# Patient Record
Sex: FEMALE | Race: BLACK OR AFRICAN AMERICAN | NOT HISPANIC OR LATINO | Employment: OTHER | URBAN - METROPOLITAN AREA
[De-identification: names, ages, dates, MRNs, and addresses within clinical notes are randomized per-mention and may not be internally consistent; named-entity substitution may affect disease eponyms.]

---

## 2018-04-09 ENCOUNTER — TELEPHONE (OUTPATIENT)
Dept: FAMILY MEDICINE | Facility: CLINIC | Age: 58
End: 2018-04-09

## 2018-04-09 NOTE — TELEPHONE ENCOUNTER
Pt came into the office and left a message regarding her visits with Dr. Coleman's office.  I spoke with Dai Coyne, nurse at Dr. Coleman's office, who informed me that pt has been seen at their office and an echocardiogram was ordered and submitted to the insurance company three times,  which patient's insurance company has declined to pay for, which has been relayed to the patient.  I have spoken with the patient, who stated that she has no follow-up appointments with Dr. Coleman and that there is study that she has not been able to get; the above was explained to the patient, as Dr. Coleman's office has done, and that she needs to speak with their office regarding a follow-up visit.  Pt asked what she was supposed to do regarding the study and it was explained again that Dr. Coleman's office has submitted this to the insurance company and that they are denying approval and that patient does have the option to pay for the echocardiogram out of pocket.  Pt asked what to do if she has any episodes of chest pain and she was advised to report to the ED should this occur.  Patient verbalized understanding.   Courtney

## 2018-05-15 RX ORDER — RIVAROXABAN 20 MG/1
20 TABLET, FILM COATED ORAL DAILY
Qty: 30 TABLET | Refills: 11 | Status: SHIPPED | OUTPATIENT
Start: 2018-05-15 | End: 2019-03-06

## 2018-05-15 RX ORDER — RIVAROXABAN 20 MG/1
TABLET, FILM COATED ORAL
Refills: 1 | COMMUNITY
Start: 2018-04-15 | End: 2018-05-15 | Stop reason: SDUPTHER

## 2018-05-15 RX ORDER — METOPROLOL TARTRATE 25 MG/1
TABLET, FILM COATED ORAL
Refills: 1 | COMMUNITY
Start: 2018-04-15 | End: 2018-05-15 | Stop reason: SDUPTHER

## 2018-05-15 RX ORDER — METOPROLOL TARTRATE 25 MG/1
25 TABLET, FILM COATED ORAL 2 TIMES DAILY
Qty: 60 TABLET | Refills: 11 | Status: SHIPPED | OUTPATIENT
Start: 2018-05-15 | End: 2019-03-06

## 2018-05-15 NOTE — TELEPHONE ENCOUNTER
----- Message from Lennox Butts sent at 5/15/2018 10:18 AM CDT -----  Contact: Patient  Type:  RX Refill Request    Who Called:  Patient  Refill or New Rx:  refill  RX Name and Strength:  Zoralta 20mg, metoprolol trate 25 mg 1tab 2x daily  How is the patient currently taking it? (1XDay):    Is this a 30 day or 90 day RX:    Preferred Pharmacy with phone number:  [facility] [place]  Local or Mail Order:  local  Ordering Provider:  Dr.Adams Madrid Call Back Number:  [phone]  Additional Information:

## 2018-06-04 ENCOUNTER — OFFICE VISIT (OUTPATIENT)
Dept: FAMILY MEDICINE | Facility: CLINIC | Age: 58
End: 2018-06-04
Payer: COMMERCIAL

## 2018-06-04 VITALS
HEART RATE: 82 BPM | WEIGHT: 228 LBS | OXYGEN SATURATION: 97 % | DIASTOLIC BLOOD PRESSURE: 79 MMHG | RESPIRATION RATE: 20 BRPM | SYSTOLIC BLOOD PRESSURE: 149 MMHG | BODY MASS INDEX: 34.56 KG/M2 | HEIGHT: 68 IN

## 2018-06-04 DIAGNOSIS — I10 ESSENTIAL HYPERTENSION: ICD-10-CM

## 2018-06-04 DIAGNOSIS — M47.12 OSTEOARTHRITIS OF CERVICAL SPINE WITH MYELOPATHY: Primary | ICD-10-CM

## 2018-06-04 PROCEDURE — 99213 OFFICE O/P EST LOW 20 MIN: CPT | Mod: S$PBB,,, | Performed by: FAMILY MEDICINE

## 2018-06-04 PROCEDURE — 99213 OFFICE O/P EST LOW 20 MIN: CPT | Mod: PBBFAC,PN | Performed by: FAMILY MEDICINE

## 2018-06-04 PROCEDURE — 99999 PR PBB SHADOW E&M-EST. PATIENT-LVL III: CPT | Mod: PBBFAC,,, | Performed by: FAMILY MEDICINE

## 2018-06-04 RX ORDER — AMLODIPINE BESYLATE 10 MG/1
TABLET ORAL
Refills: 2 | COMMUNITY
Start: 2018-05-16 | End: 2018-09-04

## 2018-06-04 RX ORDER — CYCLOBENZAPRINE HCL 10 MG
TABLET ORAL
Refills: 0 | COMMUNITY
Start: 2018-03-09 | End: 2018-09-13 | Stop reason: SDUPTHER

## 2018-06-04 RX ORDER — HYDROCHLOROTHIAZIDE 25 MG/1
TABLET ORAL
COMMUNITY
Start: 2018-05-16 | End: 2018-09-04

## 2018-06-04 RX ORDER — TRAMADOL HYDROCHLORIDE 50 MG/1
50 TABLET ORAL EVERY 8 HOURS PRN
Qty: 90 TABLET | Refills: 2 | Status: SHIPPED | OUTPATIENT
Start: 2018-06-04 | End: 2019-03-06 | Stop reason: SDUPTHER

## 2018-06-04 RX ORDER — METHOCARBAMOL 500 MG/1
1 TABLET, FILM COATED ORAL
COMMUNITY
Start: 2018-02-27 | End: 2019-03-06

## 2018-06-04 RX ORDER — PRAVASTATIN SODIUM 20 MG/1
TABLET ORAL
Refills: 2 | COMMUNITY
Start: 2018-05-16 | End: 2018-12-04 | Stop reason: SDUPTHER

## 2018-06-04 RX ORDER — TRAMADOL HYDROCHLORIDE 50 MG/1
50 TABLET ORAL
COMMUNITY
Start: 2017-11-13 | End: 2018-06-04 | Stop reason: SDUPTHER

## 2018-06-04 RX ORDER — GABAPENTIN 400 MG/1
CAPSULE ORAL
Refills: 2 | COMMUNITY
Start: 2018-04-15 | End: 2018-12-04 | Stop reason: SDUPTHER

## 2018-06-04 RX ORDER — METHOCARBAMOL 500 MG/1
500 TABLET, FILM COATED ORAL 2 TIMES DAILY PRN
COMMUNITY
End: 2018-09-04 | Stop reason: SDUPTHER

## 2018-06-04 NOTE — PROGRESS NOTES
"Subjective:       Patient ID: Stephan Abraham is a 57 y.o. female.    Chief Complaint: Follow-up and Medication Refill    In regards to the patient's hypertension, patient denies chest pain/sob, and has been compliant with the medication regimen.    Patient reports that her pain is well controlled, and that medication is preservingher quality of life. Patient requests refill today, and denies any change in her pain. No abuse concerns.  reviewed.           Review of Systems   Constitutional: Negative for activity change, appetite change, chills, fatigue and fever.   HENT: Negative for congestion, dental problem, facial swelling, nosebleeds, postnasal drip, sinus pain, sore throat, trouble swallowing and voice change.    Eyes: Negative for pain, discharge and visual disturbance.   Respiratory: Negative for apnea, cough, chest tightness and shortness of breath.    Cardiovascular: Negative for chest pain and palpitations.   Gastrointestinal: Negative for abdominal pain, blood in stool, constipation and nausea.   Endocrine: Negative for cold intolerance, polydipsia and polyuria.   Genitourinary: Negative for difficulty urinating, enuresis and flank pain.   Musculoskeletal: Positive for arthralgias and back pain.   Skin: Negative for color change.   Allergic/Immunologic: Negative for environmental allergies and immunocompromised state.   Neurological: Negative for dizziness and light-headedness.   Hematological: Negative for adenopathy.   Psychiatric/Behavioral: Negative for agitation, behavioral problems, decreased concentration and dysphoric mood. The patient is not nervous/anxious.    All other systems reviewed and are negative.        Reviewed family, medical, surgical, and social history.    Objective:      BP (!) 149/79 (BP Location: Left arm, Patient Position: Sitting, BP Method: Large (Automatic))   Pulse 82   Resp 20   Ht 5' 8" (1.727 m)   Wt 103.4 kg (228 lb)   SpO2 97%   BMI 34.67 kg/m²   Physical Exam "   Constitutional: She is oriented to person, place, and time. She appears well-developed and well-nourished. No distress.   HENT:   Head: Normocephalic and atraumatic.   Nose: Nose normal.   Mouth/Throat: Oropharynx is clear and moist. No oropharyngeal exudate.   Eyes: Conjunctivae and EOM are normal. Pupils are equal, round, and reactive to light. No scleral icterus.   Neck: Normal range of motion. Neck supple. No thyromegaly present.   Cardiovascular: Normal rate, regular rhythm and normal heart sounds.  Exam reveals no gallop and no friction rub.    No murmur heard.  Pulmonary/Chest: Effort normal and breath sounds normal. No respiratory distress. She has no wheezes. She has no rales. She exhibits no tenderness.   Abdominal: Soft. Bowel sounds are normal. She exhibits no distension. There is no tenderness. There is no guarding.   Musculoskeletal: Normal range of motion. She exhibits tenderness and deformity. She exhibits no edema.   Lymphadenopathy:     She has no cervical adenopathy.   Neurological: She is alert and oriented to person, place, and time. She displays normal reflexes. No cranial nerve deficit or sensory deficit. She exhibits normal muscle tone.   Skin: Skin is warm and dry. No rash noted. She is not diaphoretic. No erythema. No pallor.   Psychiatric: She has a normal mood and affect. Her behavior is normal. Judgment and thought content normal.   Nursing note and vitals reviewed.      Assessment:       1. Osteoarthritis of cervical spine with myelopathy    2. Essential hypertension        Plan:       Osteoarthritis of cervical spine with myelopathy  -     traMADol (ULTRAM) 50 mg tablet; Take 1 tablet (50 mg total) by mouth every 8 (eight) hours as needed for Pain.  Dispense: 90 tablet; Refill: 2    Essential hypertension            Risks, benefits, and side effects were discussed with the patient. All questions were answered to the fullest satisfaction of the patient, and pt verbalized understanding  and agreement to treatment plan. Pt was to call with any new or worsening symptoms, or present to the ER.

## 2018-06-07 ENCOUNTER — TELEPHONE (OUTPATIENT)
Dept: FAMILY MEDICINE | Facility: CLINIC | Age: 58
End: 2018-06-07

## 2018-06-07 NOTE — TELEPHONE ENCOUNTER
----- Message from Valerie Shearer sent at 6/7/2018 12:12 PM CDT -----  Contact: self  Patient requesting call back regarding her EKG results. Call back number [phone]  She needs her EKG results sent to [phone]

## 2018-06-08 ENCOUNTER — TELEPHONE (OUTPATIENT)
Dept: FAMILY MEDICINE | Facility: CLINIC | Age: 58
End: 2018-06-08

## 2018-06-08 NOTE — TELEPHONE ENCOUNTER
----- Message from April Rivera sent at 6/8/2018 10:21 AM CDT -----  Contact: pt  Type: Needs Medical Advice    Who Called:  Stephan Abraham   Symptoms (please be specific):  n/a  How long has patient had these symptoms:  n/a  Pharmacy name and phone #:  n/a  Best Call Back Number:    Additional Information:  PT is calling to get a copy of her EKG sent to the Doctor Jefferson Coleman and needs a medical clearance. Her procedure is schedule for Thursday 6/14/18.  Fax# [phone]

## 2018-06-08 NOTE — TELEPHONE ENCOUNTER
I attempted to call pt numerous times to let her know that I faxed her EKG to Dr. Coleman yesterday; telephone number [phone] is not ringing.  Pt has not been seen regarding surgical clearance.  Courtney

## 2018-09-04 ENCOUNTER — LAB VISIT (OUTPATIENT)
Dept: LAB | Facility: HOSPITAL | Age: 58
End: 2018-09-04
Attending: FAMILY MEDICINE
Payer: COMMERCIAL

## 2018-09-04 ENCOUNTER — OFFICE VISIT (OUTPATIENT)
Dept: FAMILY MEDICINE | Facility: CLINIC | Age: 58
End: 2018-09-04
Payer: COMMERCIAL

## 2018-09-04 VITALS
BODY MASS INDEX: 33.8 KG/M2 | DIASTOLIC BLOOD PRESSURE: 75 MMHG | SYSTOLIC BLOOD PRESSURE: 134 MMHG | HEIGHT: 68 IN | WEIGHT: 223 LBS | RESPIRATION RATE: 18 BRPM | HEART RATE: 82 BPM | OXYGEN SATURATION: 97 %

## 2018-09-04 DIAGNOSIS — I10 ESSENTIAL HYPERTENSION: ICD-10-CM

## 2018-09-04 DIAGNOSIS — I10 ESSENTIAL HYPERTENSION: Primary | ICD-10-CM

## 2018-09-04 LAB
ALBUMIN SERPL BCP-MCNC: 3.7 G/DL
ALP SERPL-CCNC: 103 U/L
ALT SERPL W/O P-5'-P-CCNC: 28 U/L
ANION GAP SERPL CALC-SCNC: 8 MMOL/L
AST SERPL-CCNC: 26 U/L
BASOPHILS # BLD AUTO: 0.03 K/UL
BASOPHILS NFR BLD: 0.4 %
BILIRUB SERPL-MCNC: 0.9 MG/DL
BUN SERPL-MCNC: 10 MG/DL
CALCIUM SERPL-MCNC: 9.2 MG/DL
CHLORIDE SERPL-SCNC: 101 MMOL/L
CHOLEST SERPL-MCNC: 171 MG/DL
CHOLEST/HDLC SERPL: 4.9 {RATIO}
CO2 SERPL-SCNC: 30 MMOL/L
CREAT SERPL-MCNC: 0.8 MG/DL
DIFFERENTIAL METHOD: ABNORMAL
EOSINOPHIL # BLD AUTO: 0.3 K/UL
EOSINOPHIL NFR BLD: 4.2 %
ERYTHROCYTE [DISTWIDTH] IN BLOOD BY AUTOMATED COUNT: 13.7 %
EST. GFR  (AFRICAN AMERICAN): >60 ML/MIN/1.73 M^2
EST. GFR  (NON AFRICAN AMERICAN): >60 ML/MIN/1.73 M^2
GLUCOSE SERPL-MCNC: 97 MG/DL
HCT VFR BLD AUTO: 35.5 %
HDLC SERPL-MCNC: 35 MG/DL
HDLC SERPL: 20.5 %
HGB BLD-MCNC: 11.8 G/DL
IMM GRANULOCYTES # BLD AUTO: 0.03 K/UL
IMM GRANULOCYTES NFR BLD AUTO: 0.4 %
LDLC SERPL CALC-MCNC: 111.2 MG/DL
LYMPHOCYTES # BLD AUTO: 3 K/UL
LYMPHOCYTES NFR BLD: 42.9 %
MCH RBC QN AUTO: 27.3 PG
MCHC RBC AUTO-ENTMCNC: 33.2 G/DL
MCV RBC AUTO: 82 FL
MONOCYTES # BLD AUTO: 0.5 K/UL
MONOCYTES NFR BLD: 7.1 %
NEUTROPHILS # BLD AUTO: 3.2 K/UL
NEUTROPHILS NFR BLD: 45 %
NONHDLC SERPL-MCNC: 136 MG/DL
NRBC BLD-RTO: 0 /100 WBC
PLATELET # BLD AUTO: 307 K/UL
PMV BLD AUTO: 9.6 FL
POTASSIUM SERPL-SCNC: 3.7 MMOL/L
PROT SERPL-MCNC: 7.9 G/DL
RBC # BLD AUTO: 4.33 M/UL
SODIUM SERPL-SCNC: 139 MMOL/L
T4 FREE SERPL-MCNC: 0.89 NG/DL
TRIGL SERPL-MCNC: 124 MG/DL
TSH SERPL DL<=0.005 MIU/L-ACNC: 1.33 UIU/ML
WBC # BLD AUTO: 7.09 K/UL

## 2018-09-04 PROCEDURE — 36415 COLL VENOUS BLD VENIPUNCTURE: CPT

## 2018-09-04 PROCEDURE — 84443 ASSAY THYROID STIM HORMONE: CPT

## 2018-09-04 PROCEDURE — 84439 ASSAY OF FREE THYROXINE: CPT

## 2018-09-04 PROCEDURE — 85025 COMPLETE CBC W/AUTO DIFF WBC: CPT

## 2018-09-04 PROCEDURE — 99213 OFFICE O/P EST LOW 20 MIN: CPT | Mod: S$GLB,,, | Performed by: FAMILY MEDICINE

## 2018-09-04 PROCEDURE — 99999 PR PBB SHADOW E&M-EST. PATIENT-LVL III: CPT | Mod: PBBFAC,,, | Performed by: FAMILY MEDICINE

## 2018-09-04 PROCEDURE — 80061 LIPID PANEL: CPT

## 2018-09-04 PROCEDURE — 80053 COMPREHEN METABOLIC PANEL: CPT

## 2018-09-04 RX ORDER — TRIAMCINOLONE ACETONIDE 1 MG/G
PASTE DENTAL
COMMUNITY
End: 2020-03-02

## 2018-09-04 RX ORDER — LORATADINE 10 MG/1
10 TABLET ORAL
COMMUNITY
End: 2020-09-04 | Stop reason: SDUPTHER

## 2018-09-04 RX ORDER — AMLODIPINE AND BENAZEPRIL HYDROCHLORIDE 10; 20 MG/1; MG/1
1 CAPSULE ORAL DAILY
Qty: 90 CAPSULE | Refills: 3 | Status: SHIPPED | OUTPATIENT
Start: 2018-09-04 | End: 2019-08-07 | Stop reason: SDUPTHER

## 2018-09-04 NOTE — PROGRESS NOTES
"Subjective:       Patient ID: Stephan Abraham is a 58 y.o. female.    Chief Complaint: Follow-up (would like blood work ordered, discuss meds)    In regards to the patient's hypertension, patient denies chest pain/sob, and has been compliant with the medication regimen. Reports that she is out of slots for her medicine, and asks that her meds be put in a combination pill.        Review of Systems   Constitutional: Negative for activity change, appetite change, chills, fatigue and fever.   HENT: Negative for congestion, dental problem, facial swelling, nosebleeds, postnasal drip, sinus pain, sore throat, trouble swallowing and voice change.    Eyes: Negative for pain, discharge and visual disturbance.   Respiratory: Negative for apnea, cough, chest tightness and shortness of breath.    Cardiovascular: Negative for chest pain and palpitations.   Gastrointestinal: Negative for abdominal pain, blood in stool, constipation and nausea.   Endocrine: Negative for cold intolerance, polydipsia and polyuria.   Genitourinary: Negative for difficulty urinating, enuresis and flank pain.   Musculoskeletal: Positive for arthralgias and back pain.   Skin: Negative for color change.   Allergic/Immunologic: Negative for environmental allergies and immunocompromised state.   Neurological: Negative for dizziness and light-headedness.   Hematological: Negative for adenopathy.   Psychiatric/Behavioral: Negative for agitation, behavioral problems, decreased concentration and dysphoric mood. The patient is not nervous/anxious.    All other systems reviewed and are negative.        Reviewed family, medical, surgical, and social history.    Objective:      /75 (BP Location: Left arm, Patient Position: Sitting, BP Method: Medium (Automatic))   Pulse 82   Resp 18   Ht 5' 8" (1.727 m)   Wt 101.2 kg (223 lb)   SpO2 97%   BMI 33.91 kg/m²   Physical Exam   Constitutional: She is oriented to person, place, and time. She appears " well-developed and well-nourished. No distress.   HENT:   Head: Normocephalic and atraumatic.   Nose: Nose normal.   Mouth/Throat: Oropharynx is clear and moist. No oropharyngeal exudate.   Eyes: Conjunctivae and EOM are normal. Pupils are equal, round, and reactive to light. No scleral icterus.   Neck: Normal range of motion. Neck supple. No thyromegaly present.   Cardiovascular: Normal rate, regular rhythm and normal heart sounds. Exam reveals no gallop and no friction rub.   No murmur heard.  Pulmonary/Chest: Effort normal and breath sounds normal. No respiratory distress. She has no wheezes. She has no rales. She exhibits no tenderness.   Abdominal: Soft. Bowel sounds are normal. She exhibits no distension. There is no tenderness. There is no guarding.   Musculoskeletal: Normal range of motion. She exhibits tenderness and deformity. She exhibits no edema.   Lymphadenopathy:     She has no cervical adenopathy.   Neurological: She is alert and oriented to person, place, and time. She displays normal reflexes. No cranial nerve deficit or sensory deficit. She exhibits normal muscle tone.   Skin: Skin is warm and dry. No rash noted. She is not diaphoretic. No erythema. No pallor.   Psychiatric: She has a normal mood and affect. Her behavior is normal. Judgment and thought content normal.   Nursing note and vitals reviewed.      Assessment:       1. Essential hypertension        Plan:       Essential hypertension  -     amlodipine-benazepril 10-20mg (LOTREL) 10-20 mg per capsule; Take 1 capsule by mouth once daily.  Dispense: 90 capsule; Refill: 3  -     CBC auto differential; Future; Expected date: 09/04/2018  -     Comprehensive metabolic panel; Future; Expected date: 09/04/2018  -     Lipid panel; Future; Expected date: 09/04/2018  -     TSH; Future; Expected date: 09/04/2018  -     T4, free; Future; Expected date: 09/04/2018    Check blood work, change to lotrel for patient convenience.        Risks, benefits, and  side effects were discussed with the patient. All questions were answered to the fullest satisfaction of the patient, and pt verbalized understanding and agreement to treatment plan. Pt was to call with any new or worsening symptoms, or present to the ER.

## 2018-09-05 ENCOUNTER — TELEPHONE (OUTPATIENT)
Dept: FAMILY MEDICINE | Facility: CLINIC | Age: 58
End: 2018-09-05

## 2018-09-13 RX ORDER — CYCLOBENZAPRINE HCL 10 MG
10 TABLET ORAL 3 TIMES DAILY PRN
Qty: 90 TABLET | Refills: 0 | Status: SHIPPED | OUTPATIENT
Start: 2018-09-13 | End: 2018-11-19 | Stop reason: SDUPTHER

## 2018-09-13 NOTE — TELEPHONE ENCOUNTER
----- Message from Oanh Tomlinson sent at 9/13/2018  8:58 AM CDT -----  Contact: Self  Type:  RX Refill Request    Who Called:  Patient   Refill or New Rx:  Refill  RX Name and Strength:  cyclobenzaprine (FLEXERIL) 10 MG tablet  How is the patient currently taking it? (ex. 1XDay): TK 1 T PO TID PRN  Is this a 30 day or 90 day RX:  90  Preferred Pharmacy with phone number:      [facility] [address] [place] [address] [address]  [address] [place] [id number]  Phone: [phone] Fax: [phone]    Local or Mail Order:  Local   Ordering Provider:  Blake   Best Call Back Number:  [phone] (home)     Additional Information:

## 2018-09-21 ENCOUNTER — TELEPHONE (OUTPATIENT)
Dept: FAMILY MEDICINE | Facility: CLINIC | Age: 58
End: 2018-09-21

## 2018-09-21 NOTE — TELEPHONE ENCOUNTER
----- Message from Regi Lang RT sent at 9/21/2018  9:02 AM CDT -----  Contact: pt    pt , requesting to inform her labs were done at Dr. Lozano's office, thanks.

## 2018-09-27 ENCOUNTER — OFFICE VISIT (OUTPATIENT)
Dept: FAMILY MEDICINE | Facility: CLINIC | Age: 58
End: 2018-09-27
Payer: COMMERCIAL

## 2018-09-27 VITALS
HEIGHT: 68 IN | DIASTOLIC BLOOD PRESSURE: 76 MMHG | WEIGHT: 227.81 LBS | SYSTOLIC BLOOD PRESSURE: 145 MMHG | OXYGEN SATURATION: 96 % | HEART RATE: 70 BPM | BODY MASS INDEX: 34.53 KG/M2 | RESPIRATION RATE: 18 BRPM

## 2018-09-27 DIAGNOSIS — Z01.818 PRE-OPERATIVE CLEARANCE: Primary | ICD-10-CM

## 2018-09-27 PROCEDURE — 99999 PR PBB SHADOW E&M-EST. PATIENT-LVL III: CPT | Mod: PBBFAC,,, | Performed by: FAMILY MEDICINE

## 2018-09-27 PROCEDURE — 99213 OFFICE O/P EST LOW 20 MIN: CPT | Mod: S$GLB,,, | Performed by: FAMILY MEDICINE

## 2018-09-27 NOTE — PROGRESS NOTES
"Subjective:       Patient ID: Stephan Abraham is a 58 y.o. female.    Chief Complaint: Follow-up (Surgery clearance R knee)    Follow up    Surgery clearance  Knee replacement  No CP/SOB  No complaints outside of knee      Review of Systems   Constitutional: Negative for activity change, appetite change, chills, fatigue and fever.   HENT: Negative for congestion, dental problem, facial swelling, nosebleeds, postnasal drip, sinus pain, sore throat, trouble swallowing and voice change.    Eyes: Negative for pain, discharge and visual disturbance.   Respiratory: Negative for apnea, cough, chest tightness and shortness of breath.    Cardiovascular: Negative for chest pain and palpitations.   Gastrointestinal: Negative for abdominal pain, blood in stool, constipation and nausea.   Endocrine: Negative for cold intolerance, polydipsia and polyuria.   Genitourinary: Negative for difficulty urinating, enuresis and flank pain.   Musculoskeletal: Positive for arthralgias and back pain.   Skin: Negative for color change.   Allergic/Immunologic: Negative for environmental allergies and immunocompromised state.   Neurological: Negative for dizziness and light-headedness.   Hematological: Negative for adenopathy.   Psychiatric/Behavioral: Negative for agitation, behavioral problems, decreased concentration and dysphoric mood. The patient is not nervous/anxious.    All other systems reviewed and are negative.        Reviewed family, medical, surgical, and social history.    Objective:      BP (!) 145/76 (BP Location: Left arm, Patient Position: Sitting, BP Method: Medium (Automatic))   Pulse 70   Resp 18   Ht 5' 8" (1.727 m)   Wt 103.3 kg (227 lb 12.8 oz)   SpO2 96%   BMI 34.64 kg/m²   Physical Exam   Constitutional: She is oriented to person, place, and time. She appears well-developed and well-nourished. No distress.   HENT:   Head: Normocephalic and atraumatic.   Nose: Nose normal.   Mouth/Throat: Oropharynx is clear and " moist. No oropharyngeal exudate.   Eyes: Conjunctivae and EOM are normal. Pupils are equal, round, and reactive to light. No scleral icterus.   Neck: Normal range of motion. Neck supple. No thyromegaly present.   Cardiovascular: Normal rate, regular rhythm and normal heart sounds. Exam reveals no gallop and no friction rub.   No murmur heard.  Pulmonary/Chest: Effort normal and breath sounds normal. No respiratory distress. She has no wheezes. She has no rales. She exhibits no tenderness.   Abdominal: Soft. Bowel sounds are normal. She exhibits no distension. There is no tenderness. There is no guarding.   Musculoskeletal: Normal range of motion. She exhibits tenderness and deformity. She exhibits no edema.   Lymphadenopathy:     She has no cervical adenopathy.   Neurological: She is alert and oriented to person, place, and time. She displays normal reflexes. No cranial nerve deficit or sensory deficit. She exhibits normal muscle tone.   Skin: Skin is warm and dry. No rash noted. She is not diaphoretic. No erythema. No pallor.   Psychiatric: She has a normal mood and affect. Her behavior is normal. Judgment and thought content normal.   Nursing note and vitals reviewed.      Assessment:       No diagnosis found.    Plan:       There are no diagnoses linked to this encounter.        Risks, benefits, and side effects were discussed with the patient. All questions were answered to the fullest satisfaction of the patient, and pt verbalized understanding and agreement to treatment plan. Pt was to call with any new or worsening symptoms, or present to the ER.

## 2018-09-30 ENCOUNTER — TELEPHONE (OUTPATIENT)
Dept: FAMILY MEDICINE | Facility: CLINIC | Age: 58
End: 2018-09-30

## 2018-09-30 NOTE — TELEPHONE ENCOUNTER
----- Message from Jim Lozano MD sent at 9/27/2018 10:46 AM CDT -----  Write clearance letter pending labs.

## 2018-09-30 NOTE — LETTER
September 30, 2018    Stephan Abraham  [address] [facility] [place] [address]             [address] [place] [address]  Phone: [phone]  Fax: [phone] Good afternoon,    I had the pleasure of seeing Ms. Stephan Abraham  in the office for surgical clearance. After performing a physical exam, and reviewing her blood work, EKG, and CXR, the patient is considered low risk for surgery and is cleared for surgery.      If you have any questions or concerns, please don't hesitate to call.    Sincerely,        Jim Lozano MD

## 2018-10-03 ENCOUNTER — TELEPHONE (OUTPATIENT)
Dept: FAMILY MEDICINE | Facility: CLINIC | Age: 58
End: 2018-10-03

## 2018-10-03 NOTE — TELEPHONE ENCOUNTER
I have spoken with pt and she sees Dr. Coleman.  Does a referral need to be put in for her to see him for surgical clearance, or shall I just fax the EKG along with a cover sheet that says she needs an appt for it?  ThanksAngelika

## 2018-11-19 RX ORDER — CYCLOBENZAPRINE HCL 10 MG
10 TABLET ORAL 3 TIMES DAILY PRN
Qty: 90 TABLET | Refills: 11 | Status: SHIPPED | OUTPATIENT
Start: 2018-11-19 | End: 2019-12-30 | Stop reason: SDUPTHER

## 2018-12-04 ENCOUNTER — OFFICE VISIT (OUTPATIENT)
Dept: FAMILY MEDICINE | Facility: CLINIC | Age: 58
End: 2018-12-04
Payer: COMMERCIAL

## 2018-12-04 VITALS
HEIGHT: 68 IN | HEART RATE: 93 BPM | OXYGEN SATURATION: 97 % | RESPIRATION RATE: 16 BRPM | BODY MASS INDEX: 33.77 KG/M2 | DIASTOLIC BLOOD PRESSURE: 77 MMHG | WEIGHT: 222.81 LBS | SYSTOLIC BLOOD PRESSURE: 142 MMHG

## 2018-12-04 DIAGNOSIS — Z23 NEED FOR VACCINATION: ICD-10-CM

## 2018-12-04 DIAGNOSIS — E78.5 HYPERLIPIDEMIA, UNSPECIFIED HYPERLIPIDEMIA TYPE: ICD-10-CM

## 2018-12-04 DIAGNOSIS — I10 ESSENTIAL HYPERTENSION: Primary | ICD-10-CM

## 2018-12-04 PROCEDURE — 99999 PR PBB SHADOW E&M-EST. PATIENT-LVL III: CPT | Mod: PBBFAC,,, | Performed by: FAMILY MEDICINE

## 2018-12-04 PROCEDURE — 90471 IMMUNIZATION ADMIN: CPT | Mod: S$GLB,,, | Performed by: FAMILY MEDICINE

## 2018-12-04 PROCEDURE — 90686 IIV4 VACC NO PRSV 0.5 ML IM: CPT | Mod: S$GLB,,, | Performed by: FAMILY MEDICINE

## 2018-12-04 PROCEDURE — 99213 OFFICE O/P EST LOW 20 MIN: CPT | Mod: 25,S$GLB,, | Performed by: FAMILY MEDICINE

## 2018-12-04 RX ORDER — GABAPENTIN 400 MG/1
400 CAPSULE ORAL 3 TIMES DAILY
Qty: 270 CAPSULE | Refills: 3 | Status: SHIPPED | OUTPATIENT
Start: 2018-12-04 | End: 2019-12-30 | Stop reason: SDUPTHER

## 2018-12-04 RX ORDER — ASPIRIN 325 MG
325 TABLET ORAL 2 TIMES DAILY
COMMUNITY

## 2018-12-04 RX ORDER — PRAVASTATIN SODIUM 20 MG/1
20 TABLET ORAL DAILY
Qty: 90 TABLET | Refills: 3 | Status: SHIPPED | OUTPATIENT
Start: 2018-12-04 | End: 2019-12-30 | Stop reason: SDUPTHER

## 2018-12-04 RX ORDER — NAPROXEN 500 MG/1
TABLET ORAL
Refills: 0 | COMMUNITY
Start: 2018-09-18 | End: 2019-03-06

## 2018-12-04 NOTE — PROGRESS NOTES
"Subjective:       Patient ID: Stephan Abraham is a 58 y.o. female.    Chief Complaint: Follow-up and Flu Vaccine (& Pneumonia Vaccine )    Follow up    Doing well since knee replacement  Going to PT  Feeling well    In regards to the patient's dyslipidemia, patient reports has been compliant with the medication regimen  without side effects.        Review of Systems   Constitutional: Negative for activity change, appetite change, chills, fatigue and fever.   HENT: Negative for congestion, dental problem, facial swelling, nosebleeds, postnasal drip, sinus pain, sore throat, trouble swallowing and voice change.    Eyes: Negative for pain, discharge and visual disturbance.   Respiratory: Negative for apnea, cough, chest tightness and shortness of breath.    Cardiovascular: Negative for chest pain and palpitations.   Gastrointestinal: Negative for abdominal pain, blood in stool, constipation and nausea.   Endocrine: Negative for cold intolerance, polydipsia and polyuria.   Genitourinary: Negative for difficulty urinating, enuresis and flank pain.   Musculoskeletal: Positive for arthralgias and back pain.   Skin: Negative for color change.   Allergic/Immunologic: Negative for environmental allergies and immunocompromised state.   Neurological: Negative for dizziness and light-headedness.   Hematological: Negative for adenopathy.   Psychiatric/Behavioral: Negative for agitation, behavioral problems, decreased concentration and dysphoric mood. The patient is not nervous/anxious.    All other systems reviewed and are negative.        Reviewed family, medical, surgical, and social history.    Objective:      BP (!) 142/77 (BP Location: Left arm, Patient Position: Sitting, BP Method: Medium (Automatic))   Pulse 93   Resp 16   Ht 5' 8" (1.727 m)   Wt 101.1 kg (222 lb 12.8 oz)   SpO2 97%   BMI 33.88 kg/m²   Physical Exam   Constitutional: She is oriented to person, place, and time. She appears well-developed and " well-nourished. No distress.   HENT:   Head: Normocephalic and atraumatic.   Nose: Nose normal.   Mouth/Throat: Oropharynx is clear and moist. No oropharyngeal exudate.   Eyes: Conjunctivae and EOM are normal. Pupils are equal, round, and reactive to light. No scleral icterus.   Neck: Normal range of motion. Neck supple. No thyromegaly present.   Cardiovascular: Normal rate, regular rhythm and normal heart sounds. Exam reveals no gallop and no friction rub.   No murmur heard.  Pulmonary/Chest: Effort normal and breath sounds normal. No respiratory distress. She has no wheezes. She has no rales. She exhibits no tenderness.   Abdominal: Soft. Bowel sounds are normal. She exhibits no distension. There is no tenderness. There is no guarding.   Musculoskeletal: Normal range of motion. She exhibits tenderness and deformity. She exhibits no edema.   Lymphadenopathy:     She has no cervical adenopathy.   Neurological: She is alert and oriented to person, place, and time. She displays normal reflexes. No cranial nerve deficit or sensory deficit. She exhibits normal muscle tone.   Skin: Skin is warm and dry. No rash noted. She is not diaphoretic. No erythema. No pallor.   Psychiatric: She has a normal mood and affect. Her behavior is normal. Judgment and thought content normal.   Nursing note and vitals reviewed.      Assessment:       1. Essential hypertension    2. Hyperlipidemia, unspecified hyperlipidemia type    3. Need for vaccination        Plan:       Essential hypertension  -     pravastatin (PRAVACHOL) 20 MG tablet; Take 1 tablet (20 mg total) by mouth once daily.  Dispense: 90 tablet; Refill: 3    Hyperlipidemia, unspecified hyperlipidemia type  -     pravastatin (PRAVACHOL) 20 MG tablet; Take 1 tablet (20 mg total) by mouth once daily.  Dispense: 90 tablet; Refill: 3    Need for vaccination  -     Influenza - Quadrivalent (3 years & older) (PF)    Other orders  -     gabapentin (NEURONTIN) 400 MG capsule; Take 1  capsule (400 mg total) by mouth 3 (three) times daily.  Dispense: 270 capsule; Refill: 3            Risks, benefits, and side effects were discussed with the patient. All questions were answered to the fullest satisfaction of the patient, and pt verbalized understanding and agreement to treatment plan. Pt was to call with any new or worsening symptoms, or present to the ER.

## 2019-01-30 ENCOUNTER — HOSPITAL ENCOUNTER (EMERGENCY)
Facility: HOSPITAL | Age: 59
Discharge: HOME | End: 2019-01-30
Payer: MEDICARE

## 2019-01-30 VITALS
HEART RATE: 90 BPM | SYSTOLIC BLOOD PRESSURE: 134 MMHG | DIASTOLIC BLOOD PRESSURE: 83 MMHG | RESPIRATION RATE: 18 BRPM | OXYGEN SATURATION: 99 % | TEMPERATURE: 99 F

## 2019-01-30 DIAGNOSIS — R31.9 URINARY TRACT INFECTION WITH HEMATURIA, SITE UNSPECIFIED: Primary | ICD-10-CM

## 2019-01-30 DIAGNOSIS — N39.0 URINARY TRACT INFECTION WITH HEMATURIA, SITE UNSPECIFIED: Primary | ICD-10-CM

## 2019-01-30 LAB
BACTERIA #/AREA URNS HPF: ABNORMAL /HPF
BILIRUB UR QL STRIP: NEGATIVE
CLARITY UR: ABNORMAL
COLOR UR: YELLOW
GLUCOSE UR QL STRIP: NEGATIVE
HGB UR QL STRIP: ABNORMAL
HYALINE CASTS #/AREA URNS LPF: 0 /LPF
KETONES UR QL STRIP: NEGATIVE
LEUKOCYTE ESTERASE UR QL STRIP: ABNORMAL
MICROSCOPIC COMMENT: ABNORMAL
NITRITE UR QL STRIP: POSITIVE
PH UR STRIP: 7 [PH] (ref 5–8)
PROT UR QL STRIP: ABNORMAL
RBC #/AREA URNS HPF: 80 /HPF (ref 0–4)
SP GR UR STRIP: 1.02 (ref 1–1.03)
URN SPEC COLLECT METH UR: ABNORMAL
UROBILINOGEN UR STRIP-ACNC: 1 EU/DL
WBC #/AREA URNS HPF: 60 /HPF (ref 0–5)

## 2019-01-30 PROCEDURE — 87186 SC STD MICRODIL/AGAR DIL: CPT

## 2019-01-30 PROCEDURE — 87086 URINE CULTURE/COLONY COUNT: CPT

## 2019-01-30 PROCEDURE — 87088 URINE BACTERIA CULTURE: CPT

## 2019-01-30 PROCEDURE — 81000 URINALYSIS NONAUTO W/SCOPE: CPT

## 2019-01-30 PROCEDURE — 87077 CULTURE AEROBIC IDENTIFY: CPT

## 2019-01-30 PROCEDURE — 99282 EMERGENCY DEPT VISIT SF MDM: CPT

## 2019-01-30 RX ORDER — SULFAMETHOXAZOLE AND TRIMETHOPRIM 800; 160 MG/1; MG/1
1 TABLET ORAL 2 TIMES DAILY
Qty: 14 TABLET | Refills: 0 | Status: SHIPPED | OUTPATIENT
Start: 2019-01-30 | End: 2019-02-06

## 2019-01-30 RX ORDER — PHENAZOPYRIDINE HYDROCHLORIDE 200 MG/1
200 TABLET, FILM COATED ORAL 3 TIMES DAILY
Qty: 6 TABLET | Refills: 0 | Status: SHIPPED | OUTPATIENT
Start: 2019-01-30 | End: 2019-02-01

## 2019-01-31 NOTE — ED TRIAGE NOTES
Patient with dysuria yesterday. Now with low back pain ,low abd cramping and burning on urination

## 2019-01-31 NOTE — ED PROVIDER NOTES
Encounter Date: 1/30/2019       History     Chief Complaint   Patient presents with    Dysuria    Back Pain     Stephan arreola a 58 y.o female with PMHx including hyptension. She presents to ED with back pain, pelvic cramping, dysuria and urgency x 2 days  She is taking OTC AZO for symptoms  No fever  No abdominal or flank pain  No vaginal discharge or bleed          Review of patient's allergies indicates:   Allergen Reactions    Adhesive     Latex Hives     Past Medical History:   Diagnosis Date    Allergy     Hypertension      Past Surgical History:   Procedure Laterality Date    BACK SURGERY      HYSTERECTOMY      NECK SURGERY      TOTAL KNEE ARTHROPLASTY      TUBAL LIGATION       History reviewed. No pertinent family history.  Social History     Tobacco Use    Smoking status: Never Smoker    Smokeless tobacco: Never Used   Substance Use Topics    Alcohol use: No    Drug use: No     Review of Systems   Constitutional: Negative for chills and fever.   HENT: Negative for congestion, ear discharge, ear pain, sinus pressure, sinus pain and sore throat.    Respiratory: Negative for cough and shortness of breath.    Cardiovascular: Negative for chest pain.   Gastrointestinal: Negative for abdominal distention, abdominal pain, constipation, diarrhea, nausea and vomiting.   Genitourinary: Positive for frequency, pelvic pain (cramping) and urgency. Negative for dysuria, flank pain, genital sores, menstrual problem, vaginal bleeding, vaginal discharge and vaginal pain.   Musculoskeletal: Negative for back pain.   Skin: Negative for rash.   Neurological: Negative for weakness.   Hematological: Does not bruise/bleed easily.   All other systems reviewed and are negative.      Physical Exam     Initial Vitals [01/30/19 1951]   BP Pulse Resp Temp SpO2   134/83 90 18 98.8 °F (37.1 °C) 99 %      MAP       --         Physical Exam    Nursing note and vitals reviewed.  Constitutional: She appears well-developed and  well-nourished.   HENT:   Head: Normocephalic.   Eyes: Pupils are equal, round, and reactive to light.   Neck: Normal range of motion.   Cardiovascular: Normal rate, regular rhythm and normal heart sounds.   Pulmonary/Chest: Breath sounds normal.   Abdominal: Soft. Bowel sounds are normal. She exhibits no distension and no mass. There is no tenderness. There is no rebound and no guarding.       Musculoskeletal: Normal range of motion.   Neurological: She is alert and oriented to person, place, and time.   Skin: Skin is warm and dry.   Psychiatric: She has a normal mood and affect. Her behavior is normal. Judgment and thought content normal.         ED Course   Procedures  Labs Reviewed   URINALYSIS, REFLEX TO URINE CULTURE - Abnormal; Notable for the following components:       Result Value    Appearance, UA Cloudy (*)     Protein, UA 3+ (*)     Occult Blood UA 3+ (*)     Nitrite, UA Positive (*)     Leukocytes, UA 1+ (*)     All other components within normal limits    Narrative:     Preferred Collection Type->Urine, Clean Catch   URINALYSIS MICROSCOPIC - Abnormal; Notable for the following components:    RBC, UA 80 (*)     WBC, UA 60 (*)     Bacteria, UA Few (*)     All other components within normal limits    Narrative:     Preferred Collection Type->Urine, Clean Catch   CULTURE, URINE          Imaging Results    None          Medical Decision Making:   Initial Assessment:   Patient with back pain, pelvic cramping, dysuria and urgency x 2 days  She is taking OTC AZO for symptoms  No fever  No abdominal or flank pain  No vaginal discharge or bleed    Differential Diagnosis:   UTI  STI  Kidney infection      ED Management:  UA obtained    Discussed physical exam findings with patient  No acute emergent medical condition identified at this time to warrant further testing/diagnostics.  Plan to discharge patient home with instructions per AVS.  Follow-up with PCP in 2-5 days or return to ED if symptoms  worsen.  Patient verbalized agreement to discharge treatment plan.                      Clinical Impression:   The encounter diagnosis was Urinary tract infection with hematuria, site unspecified.                             Mariola Evans NP  02/01/19 [id number]

## 2019-01-31 NOTE — ED NOTES
Patient verbalized understanding of discharge and medication instructions. To discharge with steady gait  Breathing even and unlabored NAD  Escorted patient to the discharge window.

## 2019-02-02 LAB — BACTERIA UR CULT: NORMAL

## 2019-03-06 ENCOUNTER — OFFICE VISIT (OUTPATIENT)
Dept: FAMILY MEDICINE | Facility: CLINIC | Age: 59
End: 2019-03-06
Payer: MEDICARE

## 2019-03-06 VITALS
OXYGEN SATURATION: 97 % | SYSTOLIC BLOOD PRESSURE: 137 MMHG | DIASTOLIC BLOOD PRESSURE: 79 MMHG | HEIGHT: 68 IN | HEART RATE: 92 BPM | WEIGHT: 229.38 LBS | RESPIRATION RATE: 16 BRPM | BODY MASS INDEX: 34.76 KG/M2

## 2019-03-06 DIAGNOSIS — M47.12 OSTEOARTHRITIS OF CERVICAL SPINE WITH MYELOPATHY: ICD-10-CM

## 2019-03-06 PROCEDURE — 99999 PR PBB SHADOW E&M-EST. PATIENT-LVL III: CPT | Mod: PBBFAC,,, | Performed by: FAMILY MEDICINE

## 2019-03-06 PROCEDURE — 99213 OFFICE O/P EST LOW 20 MIN: CPT | Mod: S$PBB,,, | Performed by: FAMILY MEDICINE

## 2019-03-06 PROCEDURE — 99213 OFFICE O/P EST LOW 20 MIN: CPT | Mod: PBBFAC,PN | Performed by: FAMILY MEDICINE

## 2019-03-06 PROCEDURE — 99213 PR OFFICE/OUTPT VISIT, EST, LEVL III, 20-29 MIN: ICD-10-PCS | Mod: S$PBB,,, | Performed by: FAMILY MEDICINE

## 2019-03-06 PROCEDURE — 99999 PR PBB SHADOW E&M-EST. PATIENT-LVL III: ICD-10-PCS | Mod: PBBFAC,,, | Performed by: FAMILY MEDICINE

## 2019-03-06 RX ORDER — TRAMADOL HYDROCHLORIDE 50 MG/1
50 TABLET ORAL EVERY 8 HOURS PRN
Qty: 90 TABLET | Refills: 2 | Status: SHIPPED | OUTPATIENT
Start: 2019-03-06 | End: 2019-06-05 | Stop reason: SDUPTHER

## 2019-03-06 RX ORDER — MELOXICAM 15 MG/1
TABLET ORAL
COMMUNITY
End: 2019-12-30 | Stop reason: SDUPTHER

## 2019-03-06 NOTE — PROGRESS NOTES
"Subjective:       Patient ID: Stephan Abraham is a 58 y.o. female.    Chief Complaint: Follow-up and Medication Refill    Follow up    Pain stable on meloxicam and occasional tramadol  S/p cervical spine and knee replacement  Attending pt  Progressing well    Sees payment for paroxysmal afib      Review of Systems   Constitutional: Negative for activity change, appetite change, chills, fatigue and fever.   HENT: Negative for congestion, dental problem, facial swelling, nosebleeds, postnasal drip, sinus pain, sore throat, trouble swallowing and voice change.    Eyes: Negative for pain, discharge and visual disturbance.   Respiratory: Negative for apnea, cough, chest tightness and shortness of breath.    Cardiovascular: Negative for chest pain and palpitations.   Gastrointestinal: Negative for abdominal pain, blood in stool, constipation and nausea.   Endocrine: Negative for cold intolerance, polydipsia and polyuria.   Genitourinary: Negative for difficulty urinating, enuresis and flank pain.   Musculoskeletal: Positive for arthralgias and back pain.   Skin: Negative for color change.   Allergic/Immunologic: Negative for environmental allergies and immunocompromised state.   Neurological: Negative for dizziness and light-headedness.   Hematological: Negative for adenopathy.   Psychiatric/Behavioral: Negative for agitation, behavioral problems, decreased concentration and dysphoric mood. The patient is not nervous/anxious.    All other systems reviewed and are negative.        Reviewed family, medical, surgical, and social history.    Objective:      /79 (BP Location: Left arm, Patient Position: Sitting, BP Method: Medium (Automatic))   Pulse 92   Resp 16   Ht 5' 8" (1.727 m)   Wt 104.1 kg (229 lb 6.4 oz)   SpO2 97%   BMI 34.88 kg/m²   Physical Exam   Constitutional: She is oriented to person, place, and time. She appears well-developed and well-nourished. No distress.   HENT:   Head: Normocephalic and " atraumatic.   Nose: Nose normal.   Mouth/Throat: Oropharynx is clear and moist. No oropharyngeal exudate.   Eyes: Conjunctivae and EOM are normal. Pupils are equal, round, and reactive to light. No scleral icterus.   Neck: Normal range of motion. Neck supple. No thyromegaly present.   Cardiovascular: Normal rate, regular rhythm and normal heart sounds. Exam reveals no gallop and no friction rub.   No murmur heard.  Pulmonary/Chest: Effort normal and breath sounds normal. No respiratory distress. She has no wheezes. She has no rales. She exhibits no tenderness.   Abdominal: Soft. Bowel sounds are normal. She exhibits no distension. There is no tenderness. There is no guarding.   Musculoskeletal: Normal range of motion. She exhibits tenderness and deformity. She exhibits no edema.   Lymphadenopathy:     She has no cervical adenopathy.   Neurological: She is alert and oriented to person, place, and time. She displays normal reflexes. No cranial nerve deficit or sensory deficit. She exhibits normal muscle tone.   Skin: Skin is warm and dry. No rash noted. She is not diaphoretic. No erythema. No pallor.   Psychiatric: She has a normal mood and affect. Her behavior is normal. Judgment and thought content normal.   Nursing note and vitals reviewed.      Assessment:       1. Osteoarthritis of cervical spine with myelopathy        Plan:       Osteoarthritis of cervical spine with myelopathy  -     traMADol (ULTRAM) 50 mg tablet; Take 1 tablet (50 mg total) by mouth every 8 (eight) hours as needed for Pain.  Dispense: 90 tablet; Refill: 2     reviewed, will refill        Risks, benefits, and side effects were discussed with the patient. All questions were answered to the fullest satisfaction of the patient, and pt verbalized understanding and agreement to treatment plan. Pt was to call with any new or worsening symptoms, or present to the ER.

## 2019-05-02 DIAGNOSIS — Z12.11 COLON CANCER SCREENING: ICD-10-CM

## 2019-05-03 DIAGNOSIS — Z11.59 NEED FOR HEPATITIS C SCREENING TEST: ICD-10-CM

## 2019-05-03 DIAGNOSIS — Z12.39 BREAST CANCER SCREENING: ICD-10-CM

## 2019-05-22 ENCOUNTER — PATIENT OUTREACH (OUTPATIENT)
Dept: ADMINISTRATIVE | Facility: HOSPITAL | Age: 59
End: 2019-05-22

## 2019-05-22 NOTE — LETTER
May 22, 2019    Stephan Abraham  [address] [place] [address]             [facility]  [address]  [place] [address]  Phone: [phone] Dear Valada [facility] is committed to your overall health and would like to ensure that you are up to date on your recommended test and/or procedures.   Jim Lozano MD  has found that your chart shows you may be due for the following:    ANNUAL LABS  MAMMOGRAM  COLORECTAL CANCER SCREENING    If you have had any of the above done at another facility, please let us know so that we may obtain copies from that facility.  If you have a copy of these records, please provide a copy for us to scan into your chart.  You are welcome to request that the report be faxed to us at  ([phone]).     Otherwise, please schedule these appointments at your earliest convenience by calling [phone] or going to [email or web address].    If you have an upcoming scheduled appointment for the item above, please disregard this letter.    Sincerely,  Your [facility]  MD Mary Kay Mata L.P.N. Clinical Care Coordinator  [address] [place] [address], [place] [address] [phone]

## 2019-06-05 ENCOUNTER — OFFICE VISIT (OUTPATIENT)
Dept: FAMILY MEDICINE | Facility: CLINIC | Age: 59
End: 2019-06-05
Payer: MEDICARE

## 2019-06-05 VITALS
RESPIRATION RATE: 16 BRPM | HEART RATE: 92 BPM | SYSTOLIC BLOOD PRESSURE: 118 MMHG | WEIGHT: 240 LBS | OXYGEN SATURATION: 97 % | BODY MASS INDEX: 36.37 KG/M2 | HEIGHT: 68 IN | DIASTOLIC BLOOD PRESSURE: 71 MMHG

## 2019-06-05 DIAGNOSIS — E78.5 HYPERLIPIDEMIA, UNSPECIFIED HYPERLIPIDEMIA TYPE: ICD-10-CM

## 2019-06-05 DIAGNOSIS — I10 ESSENTIAL HYPERTENSION: ICD-10-CM

## 2019-06-05 DIAGNOSIS — B35.1 ONYCHOMYCOSIS: ICD-10-CM

## 2019-06-05 DIAGNOSIS — M47.12 OSTEOARTHRITIS OF CERVICAL SPINE WITH MYELOPATHY: Primary | ICD-10-CM

## 2019-06-05 PROCEDURE — 99214 PR OFFICE/OUTPT VISIT, EST, LEVL IV, 30-39 MIN: ICD-10-PCS | Mod: S$PBB,,, | Performed by: FAMILY MEDICINE

## 2019-06-05 PROCEDURE — 99999 PR PBB SHADOW E&M-EST. PATIENT-LVL III: CPT | Mod: PBBFAC,,, | Performed by: FAMILY MEDICINE

## 2019-06-05 PROCEDURE — 99999 PR PBB SHADOW E&M-EST. PATIENT-LVL III: ICD-10-PCS | Mod: PBBFAC,,, | Performed by: FAMILY MEDICINE

## 2019-06-05 PROCEDURE — 99213 OFFICE O/P EST LOW 20 MIN: CPT | Mod: PBBFAC,PN | Performed by: FAMILY MEDICINE

## 2019-06-05 PROCEDURE — 99214 OFFICE O/P EST MOD 30 MIN: CPT | Mod: S$PBB,,, | Performed by: FAMILY MEDICINE

## 2019-06-05 RX ORDER — TRAMADOL HYDROCHLORIDE 50 MG/1
50 TABLET ORAL EVERY 8 HOURS PRN
Qty: 90 TABLET | Refills: 2 | Status: SHIPPED | OUTPATIENT
Start: 2019-06-05 | End: 2020-02-05 | Stop reason: SDUPTHER

## 2019-06-05 NOTE — PROGRESS NOTES
Subjective:       Patient ID: Stephan Abraham is a 58 y.o. female.    Chief Complaint: Follow-up; Wheezing; and Nail Problem    Follow up    In regards to the patient's hypertension, patient denies chest pain/sob, and has been compliant with the medication regimen.    In regards to the patient's dyslipidemia, patient reports has been compliant with the medication regimen  without side effects.    Patient reports that her pain is well controlled, and that medication is preservingher quality of life. Patient requests refill today, and denies any change in her pain. No abuse concerns.  reviewed.           Review of Systems   Constitutional: Negative for activity change, appetite change, chills, fatigue and fever.   HENT: Negative for congestion, dental problem, facial swelling, nosebleeds, postnasal drip, sinus pain, sore throat, trouble swallowing and voice change.    Eyes: Negative for pain, discharge and visual disturbance.   Respiratory: Negative for apnea, cough, chest tightness and shortness of breath.    Cardiovascular: Negative for chest pain and palpitations.   Gastrointestinal: Negative for abdominal pain, blood in stool, constipation and nausea.   Endocrine: Negative for cold intolerance, polydipsia and polyuria.   Genitourinary: Negative for difficulty urinating, enuresis and flank pain.   Musculoskeletal: Positive for arthralgias and back pain.   Skin: Negative for color change.   Allergic/Immunologic: Negative for environmental allergies and immunocompromised state.   Neurological: Negative for dizziness and light-headedness.   Hematological: Negative for adenopathy.   Psychiatric/Behavioral: Negative for agitation, behavioral problems, decreased concentration and dysphoric mood. The patient is not nervous/anxious.    All other systems reviewed and are negative.        Reviewed family, medical, surgical, and social history.    Objective:      /71 (BP Location: Left arm, Patient Position: Sitting,  "BP Method: Medium (Automatic))   Pulse 92   Resp 16   Ht 5' 8" (1.727 m)   Wt 108.9 kg (240 lb)   SpO2 97%   BMI 36.49 kg/m²   Physical Exam   Constitutional: She is oriented to person, place, and time. She appears well-developed and well-nourished. No distress.   HENT:   Head: Normocephalic and atraumatic.   Nose: Nose normal.   Mouth/Throat: Oropharynx is clear and moist. No oropharyngeal exudate.   Eyes: Pupils are equal, round, and reactive to light. Conjunctivae and EOM are normal. No scleral icterus.   Neck: Normal range of motion. Neck supple. No thyromegaly present.   Cardiovascular: Normal rate, regular rhythm and normal heart sounds. Exam reveals no gallop and no friction rub.   No murmur heard.  Pulmonary/Chest: Effort normal and breath sounds normal. No respiratory distress. She has no wheezes. She has no rales. She exhibits no tenderness.   Abdominal: Soft. Bowel sounds are normal. She exhibits no distension. There is no tenderness. There is no guarding.   Musculoskeletal: Normal range of motion. She exhibits tenderness and deformity. She exhibits no edema.   Lymphadenopathy:     She has no cervical adenopathy.   Neurological: She is alert and oriented to person, place, and time. She displays normal reflexes. No cranial nerve deficit or sensory deficit. She exhibits normal muscle tone.   Skin: Skin is warm and dry. No rash noted. She is not diaphoretic. No erythema. No pallor.   Psychiatric: She has a normal mood and affect. Her behavior is normal. Judgment and thought content normal.   Nursing note and vitals reviewed.      Assessment:       1. Osteoarthritis of cervical spine with myelopathy    2. Onychomycosis    3. Essential hypertension    4. Hyperlipidemia, unspecified hyperlipidemia type        Plan:       Osteoarthritis of cervical spine with myelopathy  -     traMADol (ULTRAM) 50 mg tablet; Take 1 tablet (50 mg total) by mouth every 8 (eight) hours as needed for Pain.  Dispense: 90 " tablet; Refill: 2    Onychomycosis  -     Ambulatory referral to Podiatry    Essential hypertension    Hyperlipidemia, unspecified hyperlipidemia type     reviewed and consistent  Will refill medicine as shown given that patient receives greater than 30% benefit from medicine and keeps patient functional and able to complete ADL's.  UDS reviewed  Pain contract reviewed  No abuse concerns          Risks, benefits, and side effects were discussed with the patient. All questions were answered to the fullest satisfaction of the patient, and pt verbalized understanding and agreement to treatment plan. Pt was to call with any new or worsening symptoms, or present to the ER.

## 2019-06-12 ENCOUNTER — OFFICE VISIT (OUTPATIENT)
Dept: PODIATRY | Facility: CLINIC | Age: 59
End: 2019-06-12
Payer: MEDICARE

## 2019-06-12 VITALS
HEART RATE: 95 BPM | HEIGHT: 68 IN | WEIGHT: 241 LBS | BODY MASS INDEX: 36.53 KG/M2 | SYSTOLIC BLOOD PRESSURE: 128 MMHG | TEMPERATURE: 99 F | DIASTOLIC BLOOD PRESSURE: 83 MMHG

## 2019-06-12 DIAGNOSIS — B35.1 ONYCHOMYCOSIS DUE TO DERMATOPHYTE: ICD-10-CM

## 2019-06-12 DIAGNOSIS — L60.0 INGROWN NAIL: Primary | ICD-10-CM

## 2019-06-12 DIAGNOSIS — L03.031 PARONYCHIA OF GREAT TOE OF RIGHT FOOT: ICD-10-CM

## 2019-06-12 PROCEDURE — 99999 PR PBB SHADOW E&M-EST. PATIENT-LVL III: ICD-10-PCS | Mod: PBBFAC,,, | Performed by: PODIATRIST

## 2019-06-12 PROCEDURE — 99213 OFFICE O/P EST LOW 20 MIN: CPT | Mod: PBBFAC | Performed by: PODIATRIST

## 2019-06-12 PROCEDURE — 99202 PR OFFICE/OUTPT VISIT, NEW, LEVL II, 15-29 MIN: ICD-10-PCS | Mod: S$PBB,,, | Performed by: PODIATRIST

## 2019-06-12 PROCEDURE — 99999 PR PBB SHADOW E&M-EST. PATIENT-LVL III: CPT | Mod: PBBFAC,,, | Performed by: PODIATRIST

## 2019-06-12 PROCEDURE — 99202 OFFICE O/P NEW SF 15 MIN: CPT | Mod: S$PBB,,, | Performed by: PODIATRIST

## 2019-06-12 NOTE — LETTER
Rubi 15, 2019      Jim Lozano MD  [address]  [place] [address]           [facility]  [address] [place] [address]  Phone: [phone]  Fax: [phone]          Patient: Stephan Abraham   MR Number: [id number]   YOB: 1960   Date of Visit: 6/12/2019       Dear Dr. Jim Lozano:    Thank you for referring Stephan Abraham to me for evaluation. Attached you will find relevant portions of my assessment and plan of care.    If you have questions, please do not hesitate to call me. I look forward to following Stephan Abraham along with you.    Sincerely,    Walter Cornejo, DPTOSHIA    Enclosure  CC:  No Recipients    If you would like to receive this communication electronically, please contact [email or web address] or [phone] to request more information on [employer] access.    For providers and/or their staff who would like to refer a patient to [facility], please contact us through our one-stop-shop provider referral line, [facility], at [phone].    If you feel you have received this communication in error or would no longer like to receive these types of communications, please e-mail [email or web address]

## 2019-06-15 PROBLEM — L03.031 PARONYCHIA OF GREAT TOE OF RIGHT FOOT: Status: ACTIVE | Noted: 2019-06-15

## 2019-06-15 PROBLEM — L60.0 INGROWN NAIL: Status: ACTIVE | Noted: 2019-06-15

## 2019-06-15 PROBLEM — B35.1 ONYCHOMYCOSIS DUE TO DERMATOPHYTE: Status: ACTIVE | Noted: 2019-06-15

## 2019-06-16 NOTE — PROGRESS NOTES
Subjective:       Patient ID: Stephan Abraham is a 58 y.o. female.    Chief Complaint: Nail Problem (1st and 2cnd digit both feet NAIL FUNGUS ) and Nail Problem   Patient presents today with multiple complaints she is concerned about ingrowing toenails on the 1st and 2nd digits on both feet she also believes that they are signs of fungus infection in these toes she states this started about a year ago the toes became very thick discolored became ingrown repeatedly she has tried over-the-counter antifungal medication none of which has worked.  HPI  Review of Systems   Skin: Positive for color change.   All other systems reviewed and are negative.      Objective:      Physical Exam   Constitutional: She appears well-developed and well-nourished.   Cardiovascular:   Pulses:       Dorsalis pedis pulses are 1+ on the right side, and 1+ on the left side.        Posterior tibial pulses are 1+ on the right side, and 1+ on the left side.   Pulmonary/Chest: Effort normal.   Feet:   Right Foot:   Protective Sensation: 4 sites tested. 4 sites sensed.   Skin Integrity: Positive for erythema and warmth.   Left Foot:   Protective Sensation: 4 sites tested. 4 sites sensed.   Skin Integrity: Positive for erythema and warmth.   Neurological: She is alert.   Skin: Skin is warm. Capillary refill takes 2 to 3 seconds. There is erythema.   Psychiatric: She has a normal mood and affect. Her behavior is normal. Judgment and thought content normal.   Nursing note and vitals reviewed.      Assessment:       1. Ingrown nail    2. Paronychia of great toe of right foot    3. Onychomycosis due to dermatophyte        Plan:       Patient presents today with multiple complaints she has ingrown toenails on the 1st and 2nd digits on both feet she thinks the ingrown nails that are painful red and swollen arch around closely related to the fungus infection that she noticed about a year ago.  Following evaluation patient has noted significant ingrowing  and incurvated nail on both the medial lateral border bilateral hallux with early signs of infection was able to aggressively debride and remove the nail as much as possible and trim and as far proximal as possible the area was flushed irrigated Coast copious amounts of sterile saline bacitracin ointment and a light dressing was applied to the area I did discuss with the patient treating this fungal infection which likely led to the a bacterial infection and ingrowing toenails I have recommended application of Vicks vapor rub to the toenails twice a day every day for next 4-6 months advised her if her toes are not feeling better in 4-5 days after removing the ingrown toenail than to contact us immediately for further evaluation treatment.    This note was created using [employer] voice recognition software that occasionally misinterpreted phrases or words.

## 2019-07-23 ENCOUNTER — PATIENT OUTREACH (OUTPATIENT)
Dept: ADMINISTRATIVE | Facility: HOSPITAL | Age: 59
End: 2019-07-23

## 2019-08-07 ENCOUNTER — LAB VISIT (OUTPATIENT)
Dept: LAB | Facility: HOSPITAL | Age: 59
End: 2019-08-07
Attending: FAMILY MEDICINE
Payer: MEDICARE

## 2019-08-07 ENCOUNTER — OFFICE VISIT (OUTPATIENT)
Dept: FAMILY MEDICINE | Facility: CLINIC | Age: 59
End: 2019-08-07
Payer: MEDICARE

## 2019-08-07 VITALS
DIASTOLIC BLOOD PRESSURE: 65 MMHG | SYSTOLIC BLOOD PRESSURE: 130 MMHG | HEART RATE: 95 BPM | BODY MASS INDEX: 35.95 KG/M2 | RESPIRATION RATE: 16 BRPM | OXYGEN SATURATION: 96 % | HEIGHT: 68 IN | WEIGHT: 237.19 LBS

## 2019-08-07 DIAGNOSIS — I10 ESSENTIAL HYPERTENSION: Primary | ICD-10-CM

## 2019-08-07 DIAGNOSIS — Z11.59 NEED FOR HEPATITIS C SCREENING TEST: ICD-10-CM

## 2019-08-07 DIAGNOSIS — I10 ESSENTIAL HYPERTENSION: ICD-10-CM

## 2019-08-07 LAB
ALBUMIN SERPL BCP-MCNC: 3.9 G/DL (ref 3.5–5.2)
ALP SERPL-CCNC: 106 U/L (ref 55–135)
ALT SERPL W/O P-5'-P-CCNC: 33 U/L (ref 10–44)
ANION GAP SERPL CALC-SCNC: 11 MMOL/L (ref 8–16)
AST SERPL-CCNC: 27 U/L (ref 10–40)
BASOPHILS # BLD AUTO: 0.04 K/UL (ref 0–0.2)
BASOPHILS NFR BLD: 0.5 % (ref 0–1.9)
BILIRUB SERPL-MCNC: 1.2 MG/DL (ref 0.1–1)
BUN SERPL-MCNC: 17 MG/DL (ref 6–20)
CALCIUM SERPL-MCNC: 9.2 MG/DL (ref 8.7–10.5)
CHLORIDE SERPL-SCNC: 102 MMOL/L (ref 95–110)
CHOLEST SERPL-MCNC: 221 MG/DL (ref 120–199)
CHOLEST/HDLC SERPL: 5.8 {RATIO} (ref 2–5)
CO2 SERPL-SCNC: 26 MMOL/L (ref 23–29)
CREAT SERPL-MCNC: 0.9 MG/DL (ref 0.5–1.4)
DIFFERENTIAL METHOD: NORMAL
EOSINOPHIL # BLD AUTO: 0.2 K/UL (ref 0–0.5)
EOSINOPHIL NFR BLD: 3.1 % (ref 0–8)
ERYTHROCYTE [DISTWIDTH] IN BLOOD BY AUTOMATED COUNT: 13.9 % (ref 11.5–14.5)
EST. GFR  (AFRICAN AMERICAN): >60 ML/MIN/1.73 M^2
EST. GFR  (NON AFRICAN AMERICAN): >60 ML/MIN/1.73 M^2
GLUCOSE SERPL-MCNC: 105 MG/DL (ref 70–110)
HCT VFR BLD AUTO: 37.9 % (ref 37–48.5)
HDLC SERPL-MCNC: 38 MG/DL (ref 40–75)
HDLC SERPL: 17.2 % (ref 20–50)
HGB BLD-MCNC: 12.4 G/DL (ref 12–16)
IMM GRANULOCYTES # BLD AUTO: 0.03 K/UL (ref 0–0.04)
IMM GRANULOCYTES NFR BLD AUTO: 0.4 % (ref 0–0.5)
LDLC SERPL CALC-MCNC: 158.6 MG/DL (ref 63–159)
LYMPHOCYTES # BLD AUTO: 3.3 K/UL (ref 1–4.8)
LYMPHOCYTES NFR BLD: 43.7 % (ref 18–48)
MCH RBC QN AUTO: 27.9 PG (ref 27–31)
MCHC RBC AUTO-ENTMCNC: 32.7 G/DL (ref 32–36)
MCV RBC AUTO: 85 FL (ref 82–98)
MONOCYTES # BLD AUTO: 0.6 K/UL (ref 0.3–1)
MONOCYTES NFR BLD: 7.3 % (ref 4–15)
NEUTROPHILS # BLD AUTO: 3.4 K/UL (ref 1.8–7.7)
NEUTROPHILS NFR BLD: 45 % (ref 38–73)
NONHDLC SERPL-MCNC: 183 MG/DL
NRBC BLD-RTO: 0 /100 WBC
PLATELET # BLD AUTO: 301 K/UL (ref 150–350)
PMV BLD AUTO: 9.6 FL (ref 9.2–12.9)
POTASSIUM SERPL-SCNC: 4.2 MMOL/L (ref 3.5–5.1)
PROT SERPL-MCNC: 8.3 G/DL (ref 6–8.4)
RBC # BLD AUTO: 4.45 M/UL (ref 4–5.4)
SODIUM SERPL-SCNC: 139 MMOL/L (ref 136–145)
T4 FREE SERPL-MCNC: 0.73 NG/DL (ref 0.71–1.51)
TRIGL SERPL-MCNC: 122 MG/DL (ref 30–150)
TSH SERPL DL<=0.005 MIU/L-ACNC: 1.3 UIU/ML (ref 0.34–5.6)
WBC # BLD AUTO: 7.51 K/UL (ref 3.9–12.7)

## 2019-08-07 PROCEDURE — 80053 COMPREHEN METABOLIC PANEL: CPT

## 2019-08-07 PROCEDURE — 99213 PR OFFICE/OUTPT VISIT, EST, LEVL III, 20-29 MIN: ICD-10-PCS | Mod: S$PBB,,, | Performed by: FAMILY MEDICINE

## 2019-08-07 PROCEDURE — 99999 PR PBB SHADOW E&M-EST. PATIENT-LVL III: CPT | Mod: PBBFAC,,, | Performed by: FAMILY MEDICINE

## 2019-08-07 PROCEDURE — 84443 ASSAY THYROID STIM HORMONE: CPT

## 2019-08-07 PROCEDURE — 99999 PR PBB SHADOW E&M-EST. PATIENT-LVL III: ICD-10-PCS | Mod: PBBFAC,,, | Performed by: FAMILY MEDICINE

## 2019-08-07 PROCEDURE — 84439 ASSAY OF FREE THYROXINE: CPT

## 2019-08-07 PROCEDURE — 99213 OFFICE O/P EST LOW 20 MIN: CPT | Mod: S$PBB,,, | Performed by: FAMILY MEDICINE

## 2019-08-07 PROCEDURE — 99213 OFFICE O/P EST LOW 20 MIN: CPT | Mod: PBBFAC,PN | Performed by: FAMILY MEDICINE

## 2019-08-07 PROCEDURE — 85025 COMPLETE CBC W/AUTO DIFF WBC: CPT

## 2019-08-07 PROCEDURE — 80061 LIPID PANEL: CPT

## 2019-08-07 PROCEDURE — 86803 HEPATITIS C AB TEST: CPT

## 2019-08-07 PROCEDURE — 36415 COLL VENOUS BLD VENIPUNCTURE: CPT

## 2019-08-07 RX ORDER — AMLODIPINE AND BENAZEPRIL HYDROCHLORIDE 10; 20 MG/1; MG/1
1 CAPSULE ORAL DAILY
Qty: 90 CAPSULE | Refills: 3 | Status: SHIPPED | OUTPATIENT
Start: 2019-08-07 | End: 2019-10-10 | Stop reason: ALTCHOICE

## 2019-08-07 NOTE — PROGRESS NOTES
"Subjective:       Patient ID: Stephan Abraham is a 59 y.o. female.    Chief Complaint: Follow-up (pt unsure of where she had colonoscopy in 2013, will let us know next visit, would like mammogram scheduled)    Follow up    In regards to the patient's hypertension, patient denies chest pain/sob, and has been compliant with the medication regimen.       Review of Systems   Constitutional: Negative for activity change, appetite change, chills, fatigue and fever.   HENT: Negative for congestion, dental problem, facial swelling, nosebleeds, postnasal drip, sinus pain, sore throat, trouble swallowing and voice change.    Eyes: Negative for pain, discharge and visual disturbance.   Respiratory: Negative for apnea, cough, chest tightness and shortness of breath.    Cardiovascular: Negative for chest pain and palpitations.   Gastrointestinal: Negative for abdominal pain, blood in stool, constipation and nausea.   Endocrine: Negative for cold intolerance, polydipsia and polyuria.   Genitourinary: Negative for difficulty urinating, enuresis and flank pain.   Musculoskeletal: Positive for arthralgias and back pain.   Skin: Negative for color change.   Allergic/Immunologic: Negative for environmental allergies and immunocompromised state.   Neurological: Negative for dizziness and light-headedness.   Hematological: Negative for adenopathy.   Psychiatric/Behavioral: Negative for agitation, behavioral problems, decreased concentration and dysphoric mood. The patient is not nervous/anxious.    All other systems reviewed and are negative.        Reviewed family, medical, surgical, and social history.    Objective:      /65 (BP Location: Left arm, Patient Position: Sitting, BP Method: Medium (Automatic))   Pulse 95   Resp 16   Ht 5' 8" (1.727 m)   Wt 107.6 kg (237 lb 3.2 oz)   SpO2 96%   BMI 36.07 kg/m²   Physical Exam   Constitutional: She is oriented to person, place, and time. She appears well-developed and " well-nourished. No distress.   HENT:   Head: Normocephalic and atraumatic.   Nose: Nose normal.   Mouth/Throat: Oropharynx is clear and moist. No oropharyngeal exudate.   Eyes: Pupils are equal, round, and reactive to light. Conjunctivae and EOM are normal. No scleral icterus.   Neck: Normal range of motion. Neck supple. No thyromegaly present.   Cardiovascular: Normal rate, regular rhythm and normal heart sounds. Exam reveals no gallop and no friction rub.   No murmur heard.  Pulmonary/Chest: Effort normal and breath sounds normal. No respiratory distress. She has no wheezes. She has no rales. She exhibits no tenderness.   Abdominal: Soft. Bowel sounds are normal. She exhibits no distension. There is no tenderness. There is no guarding.   Musculoskeletal: Normal range of motion. She exhibits tenderness and deformity. She exhibits no edema.   Lymphadenopathy:     She has no cervical adenopathy.   Neurological: She is alert and oriented to person, place, and time. She displays normal reflexes. No cranial nerve deficit or sensory deficit. She exhibits normal muscle tone.   Skin: Skin is warm and dry. No rash noted. She is not diaphoretic. No erythema. No pallor.   Psychiatric: She has a normal mood and affect. Her behavior is normal. Judgment and thought content normal.   Nursing note and vitals reviewed.      Assessment:       1. Essential hypertension        Plan:       Essential hypertension  -     amlodipine-benazepril 10-20mg (LOTREL) 10-20 mg per capsule; Take 1 capsule by mouth once daily.  Dispense: 90 capsule; Refill: 3  -     Comprehensive metabolic panel; Future; Expected date: 08/07/2019  -     CBC auto differential; Future; Expected date: 08/07/2019  -     Lipid panel; Future; Expected date: 08/07/2019  -     TSH; Future; Expected date: 08/07/2019  -     T4, free; Future; Expected date: 08/07/2019    Will go to [facility] to get tdap due to insurance        Risks, benefits, and side effects were discussed  with the patient. All questions were answered to the fullest satisfaction of the patient, and pt verbalized understanding and agreement to treatment plan. Pt was to call with any new or worsening symptoms, or present to the ER.

## 2019-08-08 LAB — HCV AB SERPL QL IA: NEGATIVE

## 2019-08-22 ENCOUNTER — HOSPITAL ENCOUNTER (OUTPATIENT)
Dept: RADIOLOGY | Facility: HOSPITAL | Age: 59
Discharge: HOME | End: 2019-08-22
Attending: FAMILY MEDICINE
Payer: MEDICARE

## 2019-08-22 VITALS — BODY MASS INDEX: 35.92 KG/M2 | HEIGHT: 68 IN | WEIGHT: 237 LBS

## 2019-08-22 DIAGNOSIS — Z12.39 BREAST CANCER SCREENING: ICD-10-CM

## 2019-08-22 PROCEDURE — 77067 SCR MAMMO BI INCL CAD: CPT | Mod: 26,,, | Performed by: RADIOLOGY

## 2019-08-22 PROCEDURE — 77063 BREAST TOMOSYNTHESIS BI: CPT | Mod: 26,,, | Performed by: RADIOLOGY

## 2019-08-22 PROCEDURE — 77067 MAMMO DIGITAL SCREENING BILAT WITH TOMOSYNTHESIS_CAD: ICD-10-PCS | Mod: 26,,, | Performed by: RADIOLOGY

## 2019-08-22 PROCEDURE — 77067 SCR MAMMO BI INCL CAD: CPT | Mod: TC

## 2019-08-22 PROCEDURE — 77063 MAMMO DIGITAL SCREENING BILAT WITH TOMOSYNTHESIS_CAD: ICD-10-PCS | Mod: 26,,, | Performed by: RADIOLOGY

## 2019-09-28 ENCOUNTER — HOSPITAL ENCOUNTER (EMERGENCY)
Facility: HOSPITAL | Age: 59
Discharge: HOME | End: 2019-09-28
Attending: EMERGENCY MEDICINE
Payer: MEDICARE

## 2019-09-28 VITALS
TEMPERATURE: 98 F | OXYGEN SATURATION: 92 % | HEIGHT: 68 IN | SYSTOLIC BLOOD PRESSURE: 118 MMHG | BODY MASS INDEX: 35.16 KG/M2 | WEIGHT: 232 LBS | DIASTOLIC BLOOD PRESSURE: 55 MMHG | HEART RATE: 93 BPM | RESPIRATION RATE: 16 BRPM

## 2019-09-28 DIAGNOSIS — R06.02 SHORTNESS OF BREATH: ICD-10-CM

## 2019-09-28 DIAGNOSIS — N12 PYELONEPHRITIS: Primary | ICD-10-CM

## 2019-09-28 LAB
ANION GAP SERPL CALC-SCNC: 11 MMOL/L (ref 8–16)
BACTERIA #/AREA URNS HPF: ABNORMAL /HPF
BASOPHILS # BLD AUTO: 0.03 K/UL (ref 0–0.2)
BASOPHILS NFR BLD: 0.2 % (ref 0–1.9)
BILIRUB UR QL STRIP: NEGATIVE
BUN SERPL-MCNC: 17 MG/DL (ref 6–20)
CALCIUM SERPL-MCNC: 9 MG/DL (ref 8.7–10.5)
CHLORIDE SERPL-SCNC: 102 MMOL/L (ref 95–110)
CLARITY UR: CLEAR
CO2 SERPL-SCNC: 25 MMOL/L (ref 23–29)
COLOR UR: YELLOW
CREAT SERPL-MCNC: 1.2 MG/DL (ref 0.5–1.4)
DIFFERENTIAL METHOD: ABNORMAL
EOSINOPHIL # BLD AUTO: 0 K/UL (ref 0–0.5)
EOSINOPHIL NFR BLD: 0.3 % (ref 0–8)
ERYTHROCYTE [DISTWIDTH] IN BLOOD BY AUTOMATED COUNT: 13.9 % (ref 11.5–14.5)
EST. GFR  (AFRICAN AMERICAN): 57.6 ML/MIN/1.73 M^2
EST. GFR  (NON AFRICAN AMERICAN): 49.9 ML/MIN/1.73 M^2
GLUCOSE SERPL-MCNC: 132 MG/DL (ref 70–110)
GLUCOSE UR QL STRIP: NEGATIVE
HCT VFR BLD AUTO: 36.3 % (ref 37–48.5)
HGB BLD-MCNC: 12.2 G/DL (ref 12–16)
HGB UR QL STRIP: ABNORMAL
HYALINE CASTS #/AREA URNS LPF: 0 /LPF
IMM GRANULOCYTES # BLD AUTO: 0.1 K/UL (ref 0–0.04)
IMM GRANULOCYTES NFR BLD AUTO: 0.6 % (ref 0–0.5)
KETONES UR QL STRIP: ABNORMAL
LEUKOCYTE ESTERASE UR QL STRIP: ABNORMAL
LYMPHOCYTES # BLD AUTO: 2.7 K/UL (ref 1–4.8)
LYMPHOCYTES NFR BLD: 17 % (ref 18–48)
MCH RBC QN AUTO: 28.2 PG (ref 27–31)
MCHC RBC AUTO-ENTMCNC: 33.6 G/DL (ref 32–36)
MCV RBC AUTO: 84 FL (ref 82–98)
MICROSCOPIC COMMENT: ABNORMAL
MONOCYTES # BLD AUTO: 1.5 K/UL (ref 0.3–1)
MONOCYTES NFR BLD: 9.2 % (ref 4–15)
NEUTROPHILS # BLD AUTO: 11.4 K/UL (ref 1.8–7.7)
NEUTROPHILS NFR BLD: 72.7 % (ref 38–73)
NITRITE UR QL STRIP: POSITIVE
NRBC BLD-RTO: 0 /100 WBC
PH UR STRIP: 7 [PH] (ref 5–8)
PLATELET # BLD AUTO: 280 K/UL (ref 150–350)
PMV BLD AUTO: 10.1 FL (ref 9.2–12.9)
POTASSIUM SERPL-SCNC: 4 MMOL/L (ref 3.5–5.1)
PROT UR QL STRIP: ABNORMAL
RBC # BLD AUTO: 4.33 M/UL (ref 4–5.4)
RBC #/AREA URNS HPF: 5 /HPF (ref 0–4)
SODIUM SERPL-SCNC: 138 MMOL/L (ref 136–145)
SP GR UR STRIP: 1.01 (ref 1–1.03)
URN SPEC COLLECT METH UR: ABNORMAL
UROBILINOGEN UR STRIP-ACNC: 4 EU/DL
WBC # BLD AUTO: 15.69 K/UL (ref 3.9–12.7)
WBC #/AREA URNS HPF: 30 /HPF (ref 0–5)

## 2019-09-28 PROCEDURE — 96361 HYDRATE IV INFUSION ADD-ON: CPT

## 2019-09-28 PROCEDURE — 87086 URINE CULTURE/COLONY COUNT: CPT

## 2019-09-28 PROCEDURE — 63600175 PHARM REV CODE 636 W HCPCS: Performed by: EMERGENCY MEDICINE

## 2019-09-28 PROCEDURE — 96366 THER/PROPH/DIAG IV INF ADDON: CPT

## 2019-09-28 PROCEDURE — 93005 ELECTROCARDIOGRAM TRACING: CPT

## 2019-09-28 PROCEDURE — 81000 URINALYSIS NONAUTO W/SCOPE: CPT

## 2019-09-28 PROCEDURE — 96365 THER/PROPH/DIAG IV INF INIT: CPT

## 2019-09-28 PROCEDURE — 99284 EMERGENCY DEPT VISIT MOD MDM: CPT | Mod: 25

## 2019-09-28 PROCEDURE — 85025 COMPLETE CBC W/AUTO DIFF WBC: CPT

## 2019-09-28 PROCEDURE — 87088 URINE BACTERIA CULTURE: CPT

## 2019-09-28 PROCEDURE — 80048 BASIC METABOLIC PNL TOTAL CA: CPT

## 2019-09-28 PROCEDURE — 96375 TX/PRO/DX INJ NEW DRUG ADDON: CPT

## 2019-09-28 RX ORDER — ONDANSETRON 2 MG/ML
4 INJECTION INTRAMUSCULAR; INTRAVENOUS
Status: COMPLETED | OUTPATIENT
Start: 2019-09-28 | End: 2019-09-28

## 2019-09-28 RX ORDER — ONDANSETRON 4 MG/1
4 TABLET, FILM COATED ORAL EVERY 6 HOURS
Qty: 12 TABLET | Refills: 0 | Status: SHIPPED | OUTPATIENT
Start: 2019-09-28 | End: 2020-03-02

## 2019-09-28 RX ORDER — CIPROFLOXACIN 500 MG/1
500 TABLET ORAL 2 TIMES DAILY
Qty: 20 TABLET | Refills: 0 | Status: SHIPPED | OUTPATIENT
Start: 2019-09-28 | End: 2019-10-08

## 2019-09-28 RX ORDER — HYDROCODONE BITARTRATE AND ACETAMINOPHEN 5; 325 MG/1; MG/1
1 TABLET ORAL EVERY 6 HOURS PRN
Qty: 12 TABLET | Refills: 0 | Status: SHIPPED | OUTPATIENT
Start: 2019-09-28 | End: 2020-03-02

## 2019-09-28 RX ADMIN — CEFTRIAXONE SODIUM 2 G: 2 INJECTION, POWDER, FOR SOLUTION INTRAMUSCULAR; INTRAVENOUS at 03:09

## 2019-09-28 RX ADMIN — SODIUM CHLORIDE 1000 ML: 0.9 INJECTION, SOLUTION INTRAVENOUS at 02:09

## 2019-09-28 RX ADMIN — ONDANSETRON 4 MG: 2 INJECTION INTRAMUSCULAR; INTRAVENOUS at 02:09

## 2019-09-28 NOTE — DISCHARGE INSTRUCTIONS
Rocephin 1 g    Cipro 500mg-take one tablet twice daily  Zofran 4mg-one every 6-8hrs for nausea  Norco 5/325-take 1-2 tablets every 6hr for acute pain, narcotic precautions.    Urine culture should be finalized following 3-4 days and may alter the therapy    Follow-up with Dr. Laboy    Return to the ER as needed for any acute worsening

## 2019-09-28 NOTE — ED NOTES
Patient ambulates to restroom unassisted. Respirations even and unlabored, no acute distress noted.

## 2019-09-28 NOTE — ED TRIAGE NOTES
Patient unaccompanied to ED via POV. Patient complaining of dysuria, abdominal burning, and shortness of breath. Patient diagnosed with UTI at urgent care yesterday and prescribed an antibiotic. She states today she began having abdominal burning and shortness of breath.

## 2019-10-01 LAB — BACTERIA UR CULT: ABNORMAL

## 2019-10-06 ENCOUNTER — HOSPITAL ENCOUNTER (EMERGENCY)
Facility: HOSPITAL | Age: 59
Discharge: HOME | End: 2019-10-06
Attending: INTERNAL MEDICINE
Payer: MEDICARE

## 2019-10-06 VITALS
OXYGEN SATURATION: 96 % | HEIGHT: 68 IN | HEART RATE: 85 BPM | SYSTOLIC BLOOD PRESSURE: 138 MMHG | RESPIRATION RATE: 18 BRPM | TEMPERATURE: 99 F | WEIGHT: 232 LBS | DIASTOLIC BLOOD PRESSURE: 70 MMHG | BODY MASS INDEX: 35.16 KG/M2

## 2019-10-06 DIAGNOSIS — S16.1XXA CERVICAL STRAIN, ACUTE, INITIAL ENCOUNTER: Primary | ICD-10-CM
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
## 2019-10-06 PROCEDURE — 96372 THER/PROPH/DIAG INJ SC/IM: CPT

## 2019-10-06 PROCEDURE — 72125 CT CERVICAL SPINE WITHOUT CONTRAST: ICD-10-PCS | Mod: 26,,, | Performed by: RADIOLOGY

## 2019-10-06 PROCEDURE — 72125 CT NECK SPINE W/O DYE: CPT | Mod: TC

## 2019-10-06 PROCEDURE — 63600175 PHARM REV CODE 636 W HCPCS: Performed by: INTERNAL MEDICINE

## 2019-10-06 PROCEDURE — 72125 CT NECK SPINE W/O DYE: CPT | Mod: 26,,, | Performed by: RADIOLOGY

## 2019-10-06 PROCEDURE — 99284 EMERGENCY DEPT VISIT MOD MDM: CPT | Mod: 25

## 2019-10-06 RX ORDER — CYCLOBENZAPRINE HCL 10 MG
10 TABLET ORAL 3 TIMES DAILY PRN
Qty: 15 TABLET | Refills: 0 | Status: SHIPPED | OUTPATIENT
Start: 2019-10-06 | End: 2019-10-11

## 2019-10-06 RX ORDER — DEXAMETHASONE SODIUM PHOSPHATE 100 MG/10ML
10 INJECTION INTRAMUSCULAR; INTRAVENOUS
Status: COMPLETED | OUTPATIENT
Start: 2019-10-06 | End: 2019-10-06

## 2019-10-06 RX ORDER — KETOROLAC TROMETHAMINE 30 MG/ML
60 INJECTION, SOLUTION INTRAMUSCULAR; INTRAVENOUS
Status: COMPLETED | OUTPATIENT
Start: 2019-10-06 | End: 2019-10-06

## 2019-10-06 RX ORDER — IBUPROFEN 600 MG/1
600 TABLET ORAL EVERY 6 HOURS PRN
Qty: 20 TABLET | Refills: 0 | Status: SHIPPED | OUTPATIENT
Start: 2019-10-06 | End: 2020-03-02

## 2019-10-06 RX ADMIN — DEXAMETHASONE SODIUM PHOSPHATE 10 MG: 10 INJECTION INTRAMUSCULAR; INTRAVENOUS at 09:10

## 2019-10-06 RX ADMIN — KETOROLAC TROMETHAMINE 60 MG: 30 INJECTION, SOLUTION INTRAMUSCULAR at 09:10
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
## 2019-10-10 ENCOUNTER — OFFICE VISIT (OUTPATIENT)
Dept: FAMILY MEDICINE | Facility: CLINIC | Age: 59
End: 2019-10-10
Payer: MEDICARE

## 2019-10-10 ENCOUNTER — LAB VISIT (OUTPATIENT)
Dept: LAB | Facility: HOSPITAL | Age: 59
End: 2019-10-10
Attending: FAMILY MEDICINE
Payer: MEDICARE

## 2019-10-10 VITALS
HEART RATE: 90 BPM | WEIGHT: 236.38 LBS | RESPIRATION RATE: 16 BRPM | TEMPERATURE: 98 F | BODY MASS INDEX: 35.82 KG/M2 | OXYGEN SATURATION: 98 % | SYSTOLIC BLOOD PRESSURE: 139 MMHG | DIASTOLIC BLOOD PRESSURE: 76 MMHG | HEIGHT: 68 IN

## 2019-10-10 DIAGNOSIS — M79.89 LEG SWELLING: ICD-10-CM

## 2019-10-10 DIAGNOSIS — R60.0 PERIPHERAL EDEMA: Primary | ICD-10-CM

## 2019-10-10 DIAGNOSIS — I10 ESSENTIAL HYPERTENSION: ICD-10-CM

## 2019-10-10 LAB
ALBUMIN SERPL BCP-MCNC: 3.9 G/DL (ref 3.5–5.2)
ALP SERPL-CCNC: 97 U/L (ref 55–135)
ALT SERPL W/O P-5'-P-CCNC: 27 U/L (ref 10–44)
ANION GAP SERPL CALC-SCNC: 11 MMOL/L (ref 8–16)
AST SERPL-CCNC: 23 U/L (ref 10–40)
BILIRUB SERPL-MCNC: 0.9 MG/DL (ref 0.1–1)
BUN SERPL-MCNC: 16 MG/DL (ref 6–20)
CALCIUM SERPL-MCNC: 9.1 MG/DL (ref 8.7–10.5)
CHLORIDE SERPL-SCNC: 101 MMOL/L (ref 95–110)
CO2 SERPL-SCNC: 26 MMOL/L (ref 23–29)
CREAT SERPL-MCNC: 1 MG/DL (ref 0.5–1.4)
EST. GFR  (AFRICAN AMERICAN): >60 ML/MIN/1.73 M^2
EST. GFR  (NON AFRICAN AMERICAN): >60 ML/MIN/1.73 M^2
GLUCOSE SERPL-MCNC: 107 MG/DL (ref 70–110)
POTASSIUM SERPL-SCNC: 3.9 MMOL/L (ref 3.5–5.1)
PROT SERPL-MCNC: 7.8 G/DL (ref 6–8.4)
SODIUM SERPL-SCNC: 138 MMOL/L (ref 136–145)

## 2019-10-10 PROCEDURE — 99214 PR OFFICE/OUTPT VISIT, EST, LEVL IV, 30-39 MIN: ICD-10-PCS | Mod: S$GLB,,, | Performed by: FAMILY MEDICINE

## 2019-10-10 PROCEDURE — 36415 COLL VENOUS BLD VENIPUNCTURE: CPT

## 2019-10-10 PROCEDURE — 80053 COMPREHEN METABOLIC PANEL: CPT

## 2019-10-10 PROCEDURE — 99214 OFFICE O/P EST MOD 30 MIN: CPT | Mod: S$GLB,,, | Performed by: FAMILY MEDICINE

## 2019-10-10 RX ORDER — BENAZEPRIL HYDROCHLORIDE AND HYDROCHLOROTHIAZIDE 20; 12.5 MG/1; MG/1
1 TABLET ORAL DAILY
Qty: 30 TABLET | Refills: 0 | Status: SHIPPED | OUTPATIENT
Start: 2019-10-10 | End: 2019-10-10 | Stop reason: SDUPTHER

## 2019-10-10 RX ORDER — BENAZEPRIL HYDROCHLORIDE AND HYDROCHLOROTHIAZIDE 20; 12.5 MG/1; MG/1
TABLET ORAL
Qty: 90 TABLET | Refills: 0 | Status: SHIPPED | OUTPATIENT
Start: 2019-10-10 | End: 2019-11-14

## 2019-10-10 NOTE — PROGRESS NOTES
"[facility] - Clinic Note    Subjective      Ms. Abraham is a 59 y.o. female who presents to clinic with complaints of leg swelling.     Leg swelling: present in both ankles and midfoot bilaterally  Has been present for the past couple of days  States that the swelling is getting progressively worse  No significant relief with leg elevation. No decrease in swelling noticed in the morning  Admits to a "tight" feeling in the area of swelling  No recent changes in medications.   States that she was diagnosed with a UTI about 2 weeks ago and was given cipro for treatment.   Currently on amlodipine-benazepril for HTN.   Denies orthopnea, PND, or dyspnea.  Denies any trauma.    PMH Stephan has a past medical history of Allergy, Hyperlipidemia, and Hypertension.   PSXH Stephan has a past surgical history that includes Tubal ligation; Hysterectomy; Neck surgery; Back surgery; Total knee arthroplasty; and Oophorectomy.   DONALD Rothman's family history includes Atrial fibrillation in her father; Lung cancer in her mother; Pacemaker/defibrilator in her sister; Prostate cancer in her brother and father.    Stephan reports that she has never smoked. She has never used smokeless tobacco. She reports that she does not drink alcohol or use drugs.   EMMA Rothman is allergic to adhesive and latex.   MED Stephan has a current medication list which includes the following prescription(s): aspirin, cyclobenzaprine, gabapentin, hydrocodone-acetaminophen, ibuprofen, meloxicam, pravastatin, triamcinolone acetonide 0.1%, benazepril-hydrochlorthiazide, cyclobenzaprine, loratadine, ondansetron, and tramadol.     Review of Systems   Constitutional: Negative for chills and fever.   Eyes: Negative for visual disturbance.   Respiratory: Negative for shortness of breath.    Cardiovascular: Positive for leg swelling. Negative for chest pain.   Gastrointestinal: Negative for abdominal pain.   Skin: Negative for color change.   Neurological: Negative for " "dizziness.     Objective     /76 (BP Location: Right arm, Patient Position: Sitting, BP Method: X-Large (Automatic))   Pulse 90   Temp 98 °F (36.7 °C) (Oral)   Resp 16   Ht 5' 8" (1.727 m)   Wt 107.2 kg (236 lb 6.4 oz)   SpO2 98%   BMI 35.94 kg/m²     Physical Exam   Constitutional: She is well-developed, well-nourished, and in no distress. No distress.   HENT:   Head: Normocephalic and atraumatic.   Eyes: Right eye exhibits no discharge. Left eye exhibits no discharge.   Cardiovascular: Normal rate, regular rhythm, normal heart sounds and intact distal pulses. Exam reveals no gallop and no friction rub.   No murmur heard.  Non-pitting edema noted in the LE slightly above the ankle till the midfoot. Slightly tender to palpation. No erythema, ecchymosis, or warmth noted.   Pulmonary/Chest: Effort normal and breath sounds normal. No respiratory distress. She has no wheezes. She has no rales.   Musculoskeletal: She exhibits no edema.   Lymphadenopathy:     She has no cervical adenopathy.   Neurological: She is alert.   Skin: Skin is warm and dry. She is not diaphoretic.   Psychiatric: Mood and affect normal.   Vitals reviewed.    Assessment/Plan     Stephan was seen today for peripheral edema.    Diagnoses and all orders for this visit:    Peripheral edema  -     Comprehensive metabolic panel; Future    Essential hypertension  -     benazepril-hydrochlorthiazide (LOTENSIN HCT) 20-12.5 mg per tablet; Take 1 tablet by mouth once daily.  -     Comprehensive metabolic panel; Future    -Concerned that Peripheral edema likely due to amlodipine. Will stop amlodipine and start HCTZ for BP coverage. Will obtain CMP for evaluation of kidney function. Instructed patient to monitor BP at home and keep a log and bring into next visit.    Follow up in about 10 days (around 10/20/2019) for leg swelling.    Future Appointments   Date Time Provider Department Center   10/10/2019  2:50 PM [facility], LABORATORY [facility]   10/21/2019 10:40 AM Tiff Segura MD [facility]   11/6/2019  8:40 AM Jim Lozano MD [facility]       Tiff Segura MD  Family Medicine  [facility]

## 2019-10-14 NOTE — ED PROVIDER NOTES
Encounter Date: 9/28/2019       History     Chief Complaint   Patient presents with    Shortness of Breath    Vomiting    Weakness    Abdominal Pain     59 y F complains of acute illness of 2-3 d duration comprised of acute NV, flank pain and UTI diagnosed at urgent care.    Reports sense of shortness of breath though no cough, sputum, hemoptysis, or chest pain.             Review of patient's allergies indicates:   Allergen Reactions    Adhesive     Latex Hives     Past Medical History:   Diagnosis Date    Allergy     Hyperlipidemia     Hypertension      Past Surgical History:   Procedure Laterality Date    BACK SURGERY      HYSTERECTOMY      NECK SURGERY      OOPHORECTOMY      TOTAL KNEE ARTHROPLASTY      TUBAL LIGATION       Family History   Problem Relation Age of Onset    Lung cancer Mother     Prostate cancer Father     Atrial fibrillation Father     Pacemaker/defibrilator Sister     Prostate cancer Brother     Breast cancer Neg Hx      Social History     Tobacco Use    Smoking status: Never Smoker    Smokeless tobacco: Never Used   Substance Use Topics    Alcohol use: No    Drug use: No     Review of Systems   Constitutional: Negative.    HENT: Negative.    Respiratory: Positive for shortness of breath. Negative for cough, chest tightness, wheezing and stridor.    Cardiovascular: Negative for chest pain, palpitations and leg swelling.   Gastrointestinal: Positive for abdominal pain (suprapubic ), nausea and vomiting. Negative for abdominal distention, blood in stool and diarrhea.   Genitourinary: Positive for dysuria, flank pain, frequency and pelvic pain. Negative for decreased urine volume, hematuria and vaginal discharge.   Skin: Negative.    Neurological: Positive for dizziness, weakness and light-headedness. Negative for seizures, facial asymmetry, speech difficulty, numbness and headaches.   Hematological: Negative.    All other systems reviewed and are negative.      Physical  Exam     Initial Vitals [09/28/19 0055]   BP Pulse Resp Temp SpO2   139/86 87 20 98.2 °F (36.8 °C) 96 %      MAP       --         Physical Exam    Nursing note and vitals reviewed.  Constitutional: She appears well-developed and well-nourished. She is not diaphoretic. No distress.   HENT:   Head: Normocephalic and atraumatic.   Nose: Nose normal.   Mouth/Throat: Oropharynx is clear and moist. No oropharyngeal exudate.   Eyes: Conjunctivae and EOM are normal. Pupils are equal, round, and reactive to light. Right eye exhibits no discharge. Left eye exhibits no discharge. No scleral icterus.   Neck: Normal range of motion. Neck supple.   Cardiovascular: Normal rate, regular rhythm, normal heart sounds and intact distal pulses. Exam reveals no gallop and no friction rub.    No murmur heard.  Pulmonary/Chest: Breath sounds normal. No respiratory distress. She has no wheezes. She has no rhonchi. She has no rales.   Abdominal: Soft. Normal appearance and bowel sounds are normal. She exhibits no distension and no mass. There is tenderness. There is CVA tenderness. There is no rebound and no guarding.   No acute abdomen present    Musculoskeletal: Normal range of motion. She exhibits no edema or tenderness.   Lymphadenopathy:     She has no cervical adenopathy.   Neurological: She is alert and oriented to person, place, and time. She has normal strength. No cranial nerve deficit or sensory deficit.   Skin: Skin is warm and dry. Capillary refill takes less than 2 seconds. No rash noted. No erythema. No pallor.   Psychiatric: She has a normal mood and affect. Her behavior is normal. Judgment and thought content normal.         ED Course   Procedures  Labs Reviewed   CULTURE, URINE - Abnormal; Notable for the following components:       Result Value    Urine Culture, Routine   (*)     Value: DIPHTHEROIDS  > 100,000 cfu/ml  No other significant isolate      All other components within normal limits    Narrative:     Preferred  Collection Type->Urine, Clean Catch   CBC W/ AUTO DIFFERENTIAL - Abnormal; Notable for the following components:    WBC 15.69 (*)     Hematocrit 36.3 (*)     Immature Granulocytes 0.6 (*)     Gran # (ANC) 11.4 (*)     Immature Grans (Abs) 0.10 (*)     Mono # 1.5 (*)     Lymph% 17.0 (*)     All other components within normal limits   BASIC METABOLIC PANEL - Abnormal; Notable for the following components:    Glucose 132 (*)     eGFR if  57.6 (*)     eGFR if non  49.9 (*)     All other components within normal limits   URINALYSIS, REFLEX TO URINE CULTURE - Abnormal; Notable for the following components:    Protein, UA 2+ (*)     Ketones, UA 1+ (*)     Occult Blood UA Trace (*)     Nitrite, UA Positive (*)     Leukocytes, UA Trace (*)     All other components within normal limits    Narrative:     Preferred Collection Type->Urine, Clean Catch   URINALYSIS MICROSCOPIC - Abnormal; Notable for the following components:    RBC, UA 5 (*)     WBC, UA 30 (*)     All other components within normal limits    Narrative:     Preferred Collection Type->Urine, Clean Catch        ECG Results          EKG 12-lead (Final result)  Result time 09/30/19 16:58:05    Final result by Interface, Lab In [facility] (09/30/19 16:58:05)                 Narrative:    Test Reason : R06.02,    Vent. Rate : 093 BPM     Atrial Rate : 093 BPM     P-R Int : 146 ms          QRS Dur : 076 ms      QT Int : 356 ms       P-R-T Axes : 020 007 003 degrees     QTc Int : 442 ms    Sinus rhythm with Premature atrial complexes  Minimal voltage criteria for LVH, may be normal variant  Nonspecific T wave abnormality  Abnormal ECG  No previous ECGs available  Confirmed by Eric Kathleen MD ([id number]) on 9/30/2019 4:57:52 PM    Referred By: JAME   SELF           Confirmed By:Eric Kathleen MD                            Imaging Results    None          Medical Decision Making:   Clinical Tests:   Lab Tests: Ordered and Reviewed  ED  Management:        Rocephin 1 g    Cipro 500mg-take one tablet twice daily  Zofran 4mg-one every 6-8hrs for nausea  Norco 5/325-take 1-2 tablets every 6hr for acute pain, narcotic precautions.                      Clinical Impression:       ICD-10-CM ICD-9-CM   1. Pyelonephritis N12 590.80   2. Shortness of breath R06.02 786.05         Disposition:   Disposition: Discharged  Condition: Stable                        Lucas Garza MD  10/14/19 0646

## 2019-10-21 ENCOUNTER — OFFICE VISIT (OUTPATIENT)
Dept: FAMILY MEDICINE | Facility: CLINIC | Age: 59
End: 2019-10-21
Payer: MEDICARE

## 2019-10-21 VITALS
WEIGHT: 234 LBS | DIASTOLIC BLOOD PRESSURE: 78 MMHG | BODY MASS INDEX: 35.46 KG/M2 | RESPIRATION RATE: 17 BRPM | OXYGEN SATURATION: 97 % | SYSTOLIC BLOOD PRESSURE: 132 MMHG | HEART RATE: 91 BPM | HEIGHT: 68 IN

## 2019-10-21 DIAGNOSIS — R60.0 PERIPHERAL EDEMA: Primary | ICD-10-CM

## 2019-10-21 DIAGNOSIS — I10 ESSENTIAL HYPERTENSION: ICD-10-CM

## 2019-10-21 PROCEDURE — 99213 PR OFFICE/OUTPT VISIT, EST, LEVL III, 20-29 MIN: ICD-10-PCS | Mod: S$GLB,,, | Performed by: FAMILY MEDICINE

## 2019-10-21 PROCEDURE — 99213 OFFICE O/P EST LOW 20 MIN: CPT | Mod: S$GLB,,, | Performed by: FAMILY MEDICINE

## 2019-10-21 NOTE — PATIENT INSTRUCTIONS
It was a pleasure seeing you today!    Tiff Segura MD    Low-Salt Diet  This diet removes foods that are high in salt. It also limits the amount of salt you use when cooking. It is most often used for people with high blood pressure, edema (fluid retention), and kidney, liver, or heart disease.  Table salt contains the mineral sodium. Your body needs sodium to work normally. But too much sodium can make your health problems worse. Your healthcare provider is recommending a low-salt (also called low-sodium) diet for you. Your total daily allowance of salt is 1,500 to 2,300 milligrams (mg). It is less than 1 teaspoon of table salt. This means you can have only about 500 to 700 mg of sodium at each meal. People with certain health problems should limit salt intake to the lower end of the recommended range.    When you cook, dont add much salt. If you can cook without using salt, even better. Dont add salt to your food at the table.  When shopping, read food labels. Salt is often called sodium on the label. Choose foods that are salt-free, low salt, or very low salt. Note that foods with reduced salt may not lower your salt intake enough.    Beans, potatoes, and pasta  Ok: Dry beans, split peas, lentils, potatoes, rice, macaroni, pasta, spaghetti without added salt  Avoid: Potato chips, tortilla chips, and similar products  Breads and cereals  Ok: Low-sodium breads, rolls, cereals, and cakes; low-salt crackers, matzo crackers  Avoid: Salted crackers, pretzels, popcorn, Welsh toast, pancakes, muffins  Dairy  Ok: Milk, chocolate milk, hot chocolate mix, low-salt cheeses, and yogurt  Avoid: Processed cheese and cheese spreads; Roquefort, Camembert, and cottage cheese; buttermilk, instant breakfast drink  Desserts  Ok: Ice cream, frozen yogurt, juice bars, gelatin, cookies and pies, sugar, honey, jelly, hard candy  Avoid: Most pies, cakes and cookies prepared or processed with salt; instant pudding  Drinks  Ok:  Tea, coffee, fizzy (carbonated) drinks, juices  Avoid: Flavored coffees, electrolyte replacement drinks, sports drinks  Meats  Ok: All fresh meat, fish, poultry, low-salt tuna, eggs, egg substitute  Avoid: Smoked, pickled, brine-cured, or salted meats and fish. This includes hudson, chipped beef, corned beef, hot dogs, deli meats, ham, kosher meats, salt pork, sausage, canned tuna, salted codfish, smoked salmon, herring, sardines, or anchovies.  Seasonings and spices  Ok: Most seasonings are okay. Good substitutes for salt include: fresh herb blends, hot sauce, lemon, garlic, haskins, vinegar, dry mustard, parsley, cilantro, horseradish, tomato paste, regular margarine, mayonnaise, unsalted butter, cream cheese, vegetable oil, cream, low-salt salad dressing and gravy.  Avoid: Regular ketchup, relishes, pickles, soy sauce, teriyaki sauce, Worcestershire sauce, BBQ sauce, tartar sauce, meat tenderizer, chili sauce, regular gravy, regular salad dressing, salted butter  Soups  Ok: Low-salt soups and broths made with allowed foods  Avoid: Bouillon cubes, soups with smoked or salted meats, regular soup and broth  Vegetables  Ok: Most vegetables are okay; also low-salt tomato and vegetable juices  Avoid: Sauerkraut and other brine-soaked vegetables; pickles and other pickled vegetables; tomato juice, olives  Date Last Reviewed: 8/1/2016 © 2000-2017 [employer] [address] [place] [address], [place] [address]. All rights reserved. This information is not intended as a substitute for professional medical care. Always follow your healthcare professional's instructions.

## 2019-10-21 NOTE — PROGRESS NOTES
[facility] - Clinic Note    Subjective      Ms. Abraham is a 59 y.o. female who presents to clinic follow-up of peripheral edema.    Patient was seen 10 days ago with complaints of bilateral leg swelling.  Swelling was noted in the lower extremity slightly above the ankle tell the midfoot bilaterally.  Swelling was likely due to amlodipine as she was on benazepril-amlodipine for hypertension.  At that time medication was changed from friends of benazepril-amlodipine to been benazepril-hydrochlorothiazide.    Today patient reports that her leg swelling has resolved since stopping amlodipine.  She has been taking the benazepril-hydrochlorothiazide daily.  Has been checking blood pressures at home but for got her log.  States that her blood pressure has been ranging 130s/80.  BP today is 132/78.    PMANTHONY Rothman has a past medical history of Allergy, Hyperlipidemia, and Hypertension.   PSXH Stephan has a past surgical history that includes Tubal ligation; Hysterectomy; Neck surgery; Back surgery; Total knee arthroplasty; and Oophorectomy.   DONALD Rothman's family history includes Atrial fibrillation in her father; Lung cancer in her mother; Pacemaker/defibrilator in her sister; Prostate cancer in her brother and father.   ANGELICA Rothman reports that she has never smoked. She has never used smokeless tobacco. She reports that she does not drink alcohol or use drugs.   EMMA Rothman is allergic to adhesive and latex.   VIOLET Rothman has a current medication list which includes the following prescription(s): aspirin, benazepril-hydrochlorthiazide, cyclobenzaprine, gabapentin, hydrocodone-acetaminophen, ibuprofen, loratadine, meloxicam, ondansetron, pravastatin, tramadol, and triamcinolone acetonide 0.1%.     Review of Systems   Constitutional: Negative for chills and fever.   Eyes: Negative for visual disturbance.   Respiratory: Negative for shortness of breath.    Cardiovascular: Negative for chest pain and leg swelling.  "  Gastrointestinal: Negative for abdominal pain.   Neurological: Negative for dizziness, syncope and headaches.     Objective     /78 (BP Location: Right arm, Patient Position: Sitting, BP Method: Medium (Automatic))   Pulse 91   Resp 17   Ht 5' 8" (1.727 m)   Wt 106.1 kg (234 lb)   LMP  (LMP Unknown)   SpO2 97%   BMI 35.58 kg/m²     Physical Exam   Constitutional: She is well-developed, well-nourished, and in no distress. No distress.   HENT:   Head: Normocephalic and atraumatic.   Eyes: Right eye exhibits no discharge. Left eye exhibits no discharge.   Neck: Neck supple. No thyromegaly present.   Cardiovascular: Normal rate, regular rhythm, normal heart sounds and intact distal pulses. Exam reveals no gallop and no friction rub.   No murmur heard.  No pitting edema in the lower extremities bilaterally   Pulmonary/Chest: Effort normal and breath sounds normal. No respiratory distress. She has no wheezes. She has no rales.   Musculoskeletal: She exhibits no edema.   Lymphadenopathy:     She has no cervical adenopathy.   Neurological: She is alert.   Skin: Skin is warm and dry. She is not diaphoretic.   Psychiatric: Mood and affect normal.   Vitals reviewed.    Assessment/Plan     Stephan was seen today for follow-up peripheral edema.    Diagnoses and all orders for this visit:    Peripheral edema  -resolved    Essential hypertension  -well controlled with new medication.  Continue benazepril-hydrochlorothiazide.  No dose adjustment needed at this time.   Counseled to check BP at home a couple times a week and bring log into next visit. Counseled patient a low sodium diet and moderate intensity exercise 30 mins daily.      Follow up in about 3 months (around 1/21/2020) for HTN.    Future Appointments   Date Time Provider Department Center   11/6/2019  8:40 AM Jim Lozano MD [facility]       iTff Segura MD  Family Medicine  [facility]            "

## 2019-10-23 ENCOUNTER — PATIENT OUTREACH (OUTPATIENT)
Dept: ADMINISTRATIVE | Facility: HOSPITAL | Age: 59
End: 2019-10-23

## 2019-10-23 NOTE — LETTER
October 23, 2019    Stephan Abraham  [address] [place] [address]             [facility]  [address]  [place] [address]  Phone: [phone] Dear Val[facility] is committed to your overall health and would like to ensure that you are up to date on your recommended test and/or procedures.   Jim Lozano MD  has found that your chart shows you may be due for the following:    Health Maintenance Due   Topic Date Due    TETANUS VACCINE  07/27/1979    Shingles Vaccine (1 of 2) 07/27/2011    Colonoscopy  07/27/2011    Influenza Vaccine (1) 09/01/2019     If you have had any of the above done at another facility, please let us know so that we may obtain copies from that facility.  If you have a copy of these records, please provide a copy for us to scan into your chart.  You are welcome to request that the report be faxed to us at  ([phone]).     Otherwise, please schedule these appointments at your earliest convenience by calling [phone] or going to [email or web address].    If you have an upcoming scheduled appointment for the item above, please disregard this letter.    Sincerely,  [facility]  MD Mary Kay Mata L.P.N. Clinical Care Coordinator  [address] [place] [address], [place] [address] [phone]

## 2019-11-06 ENCOUNTER — OFFICE VISIT (OUTPATIENT)
Dept: FAMILY MEDICINE | Facility: CLINIC | Age: 59
End: 2019-11-06
Payer: MEDICARE

## 2019-11-06 VITALS
OXYGEN SATURATION: 98 % | HEART RATE: 90 BPM | RESPIRATION RATE: 16 BRPM | WEIGHT: 238.31 LBS | BODY MASS INDEX: 36.12 KG/M2 | HEIGHT: 68 IN | DIASTOLIC BLOOD PRESSURE: 74 MMHG | SYSTOLIC BLOOD PRESSURE: 129 MMHG

## 2019-11-06 DIAGNOSIS — Z23 NEED FOR VACCINATION: ICD-10-CM

## 2019-11-06 DIAGNOSIS — I10 ESSENTIAL HYPERTENSION: ICD-10-CM

## 2019-11-06 DIAGNOSIS — E78.5 HYPERLIPIDEMIA, UNSPECIFIED HYPERLIPIDEMIA TYPE: Primary | ICD-10-CM

## 2019-11-06 PROCEDURE — 99999 PR PBB SHADOW E&M-EST. PATIENT-LVL III: ICD-10-PCS | Mod: PBBFAC,,, | Performed by: FAMILY MEDICINE

## 2019-11-06 PROCEDURE — 99213 OFFICE O/P EST LOW 20 MIN: CPT | Mod: PBBFAC,PN,25 | Performed by: FAMILY MEDICINE

## 2019-11-06 PROCEDURE — 99999 PR PBB SHADOW E&M-EST. PATIENT-LVL III: CPT | Mod: PBBFAC,,, | Performed by: FAMILY MEDICINE

## 2019-11-06 PROCEDURE — 99214 OFFICE O/P EST MOD 30 MIN: CPT | Mod: S$PBB,,, | Performed by: FAMILY MEDICINE

## 2019-11-06 PROCEDURE — 90686 IIV4 VACC NO PRSV 0.5 ML IM: CPT | Mod: PBBFAC,PN

## 2019-11-06 PROCEDURE — 99214 PR OFFICE/OUTPT VISIT, EST, LEVL IV, 30-39 MIN: ICD-10-PCS | Mod: S$PBB,,, | Performed by: FAMILY MEDICINE

## 2019-11-06 NOTE — PROGRESS NOTES
"Subjective:       Patient ID: Stephan Abraham is a 59 y.o. female.    Chief Complaint: Follow-up (Pt will find doctors name and call )    In regards to the patient's hypertension, patient denies chest pain/sob, and reports compliance with medication regimen.    In regards to the patient's dyslipidemia, patient reports compliance with medication regimen without side effects.    Review of Systems   Constitutional: Negative for activity change, appetite change, chills, fatigue and fever.   HENT: Negative for congestion, dental problem, facial swelling, nosebleeds, postnasal drip, sinus pain, sore throat, trouble swallowing and voice change.    Eyes: Negative for pain, discharge and visual disturbance.   Respiratory: Negative for apnea, cough, chest tightness and shortness of breath.    Cardiovascular: Negative for chest pain and palpitations.   Gastrointestinal: Negative for abdominal pain, blood in stool, constipation and nausea.   Endocrine: Negative for cold intolerance, polydipsia and polyuria.   Genitourinary: Negative for difficulty urinating, enuresis and flank pain.   Musculoskeletal: Negative for arthralgias and back pain.   Skin: Negative for color change.   Allergic/Immunologic: Negative for environmental allergies and immunocompromised state.   Neurological: Negative for dizziness and light-headedness.   Hematological: Negative for adenopathy.   Psychiatric/Behavioral: Negative for agitation, behavioral problems, decreased concentration and dysphoric mood. The patient is not nervous/anxious.    All other systems reviewed and are negative.        Reviewed family, medical, surgical, and social history.    Objective:      /74 (BP Location: Left arm, Patient Position: Sitting, BP Method: Medium (Automatic))   Pulse 90   Resp 16   Ht 5' 8" (1.727 m)   Wt 108.1 kg (238 lb 4.8 oz)   LMP  (LMP Unknown)   SpO2 98%   BMI 36.23 kg/m²   Physical Exam   Constitutional: She is oriented to person, place, and " time. She appears well-developed and well-nourished. No distress.   HENT:   Head: Normocephalic and atraumatic.   Nose: Nose normal.   Mouth/Throat: Oropharynx is clear and moist. No oropharyngeal exudate.   Eyes: Pupils are equal, round, and reactive to light. Conjunctivae and EOM are normal. No scleral icterus.   Neck: Normal range of motion. Neck supple. No thyromegaly present.   Cardiovascular: Normal rate, regular rhythm and normal heart sounds. Exam reveals no gallop and no friction rub.   No murmur heard.  Pulmonary/Chest: Effort normal and breath sounds normal. No respiratory distress. She has no wheezes. She has no rales. She exhibits no tenderness.   Abdominal: Soft. Bowel sounds are normal. She exhibits no distension. There is no tenderness. There is no guarding.   Musculoskeletal: Normal range of motion. She exhibits no edema, tenderness or deformity.   Lymphadenopathy:     She has no cervical adenopathy.   Neurological: She is alert and oriented to person, place, and time. She displays normal reflexes. No cranial nerve deficit or sensory deficit. She exhibits normal muscle tone.   Skin: Skin is warm and dry. No rash noted. She is not diaphoretic. No erythema. No pallor.   Psychiatric: She has a normal mood and affect. Her behavior is normal. Judgment and thought content normal.   Nursing note and vitals reviewed.      Assessment:       1. Hyperlipidemia, unspecified hyperlipidemia type    2. Need for vaccination    3. Essential hypertension        Plan:       Hyperlipidemia, unspecified hyperlipidemia type    Need for vaccination  -     Influenza - Quadrivalent (PF)    Essential hypertension            Risks, benefits, and side effects were discussed with the patient. All questions were answered to the fullest satisfaction of the patient, and pt verbalized understanding and agreement to treatment plan. Pt was to call with any new or worsening symptoms, or present to the ER.

## 2019-11-14 DIAGNOSIS — I10 ESSENTIAL HYPERTENSION: ICD-10-CM

## 2019-11-14 RX ORDER — AMLODIPINE AND BENAZEPRIL HYDROCHLORIDE 10; 20 MG/1; MG/1
CAPSULE ORAL
Qty: 90 CAPSULE | Refills: 3 | Status: SHIPPED | OUTPATIENT
Start: 2019-11-14 | End: 2020-02-05

## 2019-11-25 ENCOUNTER — TELEPHONE (OUTPATIENT)
Dept: FAMILY MEDICINE | Facility: CLINIC | Age: 59
End: 2019-11-25

## 2019-11-25 NOTE — TELEPHONE ENCOUNTER
Dr. Shanks EKG, labs, and CXR faxed to Dr. Coleman's office per pt choice along with cover sheet asking for cardiac work-up.  Courtney

## 2019-11-25 NOTE — TELEPHONE ENCOUNTER
Attempt to call pt. No answer, left voice mail. x1           ----- Message from Lennox Butts sent at 11/25/2019 11:01 AM CST -----  Contact: patient  Type:  RX Refill Request    Who Called:  patient  Refill or New Rx:  refill  RX Name and Strength:  benazepril HCTZ  How is the patient currently taking it? (ex. 1XDay):    Is this a 30 day or 90 day RX:    Preferred Pharmacy with phone number:  [facility]  Local or Mail Order:  local  Ordering Provider:    Best Call Back Number:  [phone]  Additional Information:  Requesting a call back,stated this is working stated  wanted  Her to take this for 30 days to see if it would work

## 2019-12-30 DIAGNOSIS — E78.5 HYPERLIPIDEMIA, UNSPECIFIED HYPERLIPIDEMIA TYPE: ICD-10-CM

## 2019-12-30 DIAGNOSIS — I10 ESSENTIAL HYPERTENSION: ICD-10-CM

## 2019-12-30 RX ORDER — CYCLOBENZAPRINE HCL 10 MG
10 TABLET ORAL 3 TIMES DAILY PRN
Qty: 270 TABLET | Refills: 3 | Status: SHIPPED | OUTPATIENT
Start: 2019-12-30 | End: 2021-02-02

## 2019-12-30 RX ORDER — GABAPENTIN 400 MG/1
400 CAPSULE ORAL 3 TIMES DAILY
Qty: 270 CAPSULE | Refills: 3 | Status: SHIPPED | OUTPATIENT
Start: 2019-12-30 | End: 2021-04-21 | Stop reason: SDUPTHER

## 2019-12-30 RX ORDER — MELOXICAM 15 MG/1
15 TABLET ORAL DAILY
Qty: 90 TABLET | Refills: 3 | Status: SHIPPED | OUTPATIENT
Start: 2019-12-30 | End: 2021-02-02

## 2019-12-30 RX ORDER — PRAVASTATIN SODIUM 20 MG/1
20 TABLET ORAL DAILY
Qty: 90 TABLET | Refills: 3 | Status: SHIPPED | OUTPATIENT
Start: 2019-12-30 | End: 2021-04-05

## 2020-01-19 RX ORDER — BENAZEPRIL HYDROCHLORIDE AND HYDROCHLOROTHIAZIDE 20; 12.5 MG/1; MG/1
TABLET ORAL
Qty: 90 TABLET | Refills: 0 | OUTPATIENT
Start: 2020-01-19

## 2020-01-22 ENCOUNTER — PATIENT OUTREACH (OUTPATIENT)
Dept: ADMINISTRATIVE | Facility: HOSPITAL | Age: 60
End: 2020-01-22

## 2020-02-05 DIAGNOSIS — I10 ESSENTIAL HYPERTENSION: ICD-10-CM

## 2020-02-05 DIAGNOSIS — M47.12 OSTEOARTHRITIS OF CERVICAL SPINE WITH MYELOPATHY: ICD-10-CM

## 2020-02-05 RX ORDER — TRAMADOL HYDROCHLORIDE 50 MG/1
50 TABLET ORAL EVERY 8 HOURS PRN
Qty: 90 TABLET | Refills: 3 | Status: SHIPPED | OUTPATIENT
Start: 2020-02-05 | End: 2020-09-04 | Stop reason: SDUPTHER

## 2020-02-05 RX ORDER — BENAZEPRIL HYDROCHLORIDE AND HYDROCHLOROTHIAZIDE 20; 12.5 MG/1; MG/1
1 TABLET ORAL DAILY
Qty: 90 TABLET | Refills: 3 | Status: SHIPPED | OUTPATIENT
Start: 2020-02-05 | End: 2021-04-05

## 2020-02-05 RX ORDER — AMLODIPINE AND BENAZEPRIL HYDROCHLORIDE 10; 20 MG/1; MG/1
1 CAPSULE ORAL DAILY
Qty: 90 CAPSULE | Refills: 3 | Status: CANCELLED | OUTPATIENT
Start: 2020-02-05

## 2020-02-05 RX ORDER — BENAZEPRIL HYDROCHLORIDE AND HYDROCHLOROTHIAZIDE 20; 12.5 MG/1; MG/1
TABLET ORAL DAILY
COMMUNITY
Start: 2019-12-11 | End: 2020-02-05 | Stop reason: SDUPTHER

## 2020-02-17 ENCOUNTER — PATIENT OUTREACH (OUTPATIENT)
Dept: ADMINISTRATIVE | Facility: HOSPITAL | Age: 60
End: 2020-02-17

## 2020-02-17 NOTE — PROGRESS NOTES
Pre-visit Chart review notification  02/17/20  FAX SENT TO [facility] FOR MOST RECENT COLONOSCOPY

## 2020-02-17 NOTE — LETTER
FAX      AUTHORIZATION FOR RELEASE OF   CONFIDENTIAL INFORMATION            [facility]      We are seeing Stephan Abraham, date of birth 1960, in the clinic at [facility]. Jim Lozano MD is the patient's PCP. Stephan Abraham has an outstanding lab/procedure at the time we reviewed her chart. In order to help keep her health information updated, she has authorized us to request the following medical record(s):        (  )  MAMMOGRAM                                      ( X )  COLONOSCOPY      (  )  PAP SMEAR                                          (  )  OUTSIDE LAB RESULTS     (  )  DEXA SCAN                                          (  )  DIABETIC EYE EXAM            (  )  DIABETIC FOOT EXAM                        (  )  ENTIRE RECORD     (  )  OUTSIDE IMMUNIZATIONS                 (  )  _______________        Please fax records to [facility]  [phone]     If you have any questions, please contact Mary Kay at [phone].      Mary Kay Longoria L.P.N. Clinical Care Coordinator  [address] [place] [address], [place] [address] [phone]

## 2020-02-19 ENCOUNTER — PATIENT OUTREACH (OUTPATIENT)
Dept: ADMINISTRATIVE | Facility: HOSPITAL | Age: 60
End: 2020-02-19

## 2020-03-02 ENCOUNTER — OFFICE VISIT (OUTPATIENT)
Dept: FAMILY MEDICINE | Facility: CLINIC | Age: 60
End: 2020-03-02
Payer: COMMERCIAL

## 2020-03-02 VITALS
TEMPERATURE: 98 F | WEIGHT: 239.63 LBS | DIASTOLIC BLOOD PRESSURE: 71 MMHG | OXYGEN SATURATION: 95 % | HEIGHT: 68 IN | RESPIRATION RATE: 17 BRPM | SYSTOLIC BLOOD PRESSURE: 125 MMHG | BODY MASS INDEX: 36.32 KG/M2 | HEART RATE: 79 BPM

## 2020-03-02 DIAGNOSIS — Z12.11 COLON CANCER SCREENING: ICD-10-CM

## 2020-03-02 DIAGNOSIS — E78.49 OTHER HYPERLIPIDEMIA: ICD-10-CM

## 2020-03-02 DIAGNOSIS — I10 ESSENTIAL HYPERTENSION: Primary | ICD-10-CM

## 2020-03-02 PROCEDURE — 3078F PR MOST RECENT DIASTOLIC BLOOD PRESSURE < 80 MM HG: ICD-10-PCS | Mod: CPTII,S$GLB,, | Performed by: FAMILY MEDICINE

## 2020-03-02 PROCEDURE — 99213 PR OFFICE/OUTPT VISIT, EST, LEVL III, 20-29 MIN: ICD-10-PCS | Mod: S$GLB,,, | Performed by: FAMILY MEDICINE

## 2020-03-02 PROCEDURE — 99999 PR PBB SHADOW E&M-EST. PATIENT-LVL IV: CPT | Mod: PBBFAC,,, | Performed by: FAMILY MEDICINE

## 2020-03-02 PROCEDURE — 99999 PR PBB SHADOW E&M-EST. PATIENT-LVL IV: ICD-10-PCS | Mod: PBBFAC,,, | Performed by: FAMILY MEDICINE

## 2020-03-02 PROCEDURE — 99213 OFFICE O/P EST LOW 20 MIN: CPT | Mod: S$GLB,,, | Performed by: FAMILY MEDICINE

## 2020-03-02 PROCEDURE — 3008F BODY MASS INDEX DOCD: CPT | Mod: CPTII,S$GLB,, | Performed by: FAMILY MEDICINE

## 2020-03-02 PROCEDURE — 3008F PR BODY MASS INDEX (BMI) DOCUMENTED: ICD-10-PCS | Mod: CPTII,S$GLB,, | Performed by: FAMILY MEDICINE

## 2020-03-02 PROCEDURE — 3078F DIAST BP <80 MM HG: CPT | Mod: CPTII,S$GLB,, | Performed by: FAMILY MEDICINE

## 2020-03-02 PROCEDURE — 3074F PR MOST RECENT SYSTOLIC BLOOD PRESSURE < 130 MM HG: ICD-10-PCS | Mod: CPTII,S$GLB,, | Performed by: FAMILY MEDICINE

## 2020-03-02 PROCEDURE — 3074F SYST BP LT 130 MM HG: CPT | Mod: CPTII,S$GLB,, | Performed by: FAMILY MEDICINE

## 2020-03-02 NOTE — PROGRESS NOTES
"Subjective:       Patient ID: Stephan Abraham is a 59 y.o. female.    Chief Complaint: Follow-up (Three month f/u; Tetanus vaccination due today; Shingles vaccination to be done at patient pharmacy.)    In regards to the patient's dyslipidemia, patient reports compliance with medication regimen without side effects.    In regards to the patient's hypertension, patient denies chest pain/sob, and reports compliance with medication regimen.    Review of Systems   Constitutional: Negative for activity change, appetite change, chills, fatigue and fever.   HENT: Negative for congestion, dental problem, facial swelling, nosebleeds, postnasal drip, sinus pain, sore throat, trouble swallowing and voice change.    Eyes: Negative for pain, discharge and visual disturbance.   Respiratory: Negative for apnea, cough, chest tightness and shortness of breath.    Cardiovascular: Negative for chest pain and palpitations.   Gastrointestinal: Negative for abdominal pain, blood in stool, constipation and nausea.   Endocrine: Negative for cold intolerance, polydipsia and polyuria.   Genitourinary: Negative for difficulty urinating, enuresis and flank pain.   Musculoskeletal: Negative for arthralgias and back pain.   Skin: Negative for color change.   Allergic/Immunologic: Negative for environmental allergies and immunocompromised state.   Neurological: Negative for dizziness and light-headedness.   Hematological: Negative for adenopathy.   Psychiatric/Behavioral: Negative for agitation, behavioral problems, decreased concentration and dysphoric mood. The patient is not nervous/anxious.    All other systems reviewed and are negative.        Reviewed family, medical, surgical, and social history.    Objective:      /71 (BP Location: Left arm, Patient Position: Sitting, BP Method: Large (Automatic))   Pulse 79   Temp 98 °F (36.7 °C) (Tympanic)   Resp 17   Ht 5' 8" (1.727 m)   Wt 108.7 kg (239 lb 9.6 oz)   LMP  (LMP Unknown)   " SpO2 95%   BMI 36.43 kg/m²   Physical Exam    Assessment:       1. Essential hypertension    2. Colon cancer screening    3. Other hyperlipidemia        Plan:       Essential hypertension    Colon cancer screening  -     Ambulatory referral/consult to General Surgery; Future; Expected date: 03/09/2020    Other hyperlipidemia            Risks, benefits, and side effects were discussed with the patient. All questions were answered to the fullest satisfaction of the patient, and pt verbalized understanding and agreement to treatment plan. Pt was to call with any new or worsening symptoms, or present to the ER.

## 2020-03-23 ENCOUNTER — TELEPHONE (OUTPATIENT)
Dept: SURGERY | Facility: CLINIC | Age: 60
End: 2020-03-23

## 2020-03-23 ENCOUNTER — PATIENT OUTREACH (OUTPATIENT)
Dept: ADMINISTRATIVE | Facility: OTHER | Age: 60
End: 2020-03-23

## 2020-03-23 NOTE — TELEPHONE ENCOUNTER
Writer MANISHA for patient regarding the need to cancel/reschedule her appointment on 3-26-20 due to COVID-19. Asked pt to contact our office when she gets our message so that we can reschedule for late April, pending virus status.

## 2020-04-20 ENCOUNTER — PATIENT OUTREACH (OUTPATIENT)
Dept: ADMINISTRATIVE | Facility: OTHER | Age: 60
End: 2020-04-20

## 2020-04-21 ENCOUNTER — OFFICE VISIT (OUTPATIENT)
Dept: SURGERY | Facility: CLINIC | Age: 60
End: 2020-04-21
Payer: MEDICARE

## 2020-04-21 DIAGNOSIS — E78.49 OTHER HYPERLIPIDEMIA: ICD-10-CM

## 2020-04-21 DIAGNOSIS — I10 ESSENTIAL HYPERTENSION: ICD-10-CM

## 2020-04-21 DIAGNOSIS — Z12.11 COLON CANCER SCREENING: Primary | ICD-10-CM

## 2020-04-21 PROCEDURE — 99203 OFFICE O/P NEW LOW 30 MIN: CPT | Mod: 95,,, | Performed by: SURGERY

## 2020-04-21 PROCEDURE — 99203 PR OFFICE/OUTPT VISIT, NEW, LEVL III, 30-44 MIN: ICD-10-PCS | Mod: 95,,, | Performed by: SURGERY

## 2020-04-21 RX ORDER — SODIUM, POTASSIUM,MAG SULFATES 17.5-3.13G
SOLUTION, RECONSTITUTED, ORAL ORAL
Qty: 2 BOTTLE | Refills: 0 | Status: SHIPPED | OUTPATIENT
Start: 2020-04-21 | End: 2020-06-01

## 2020-04-21 RX ORDER — LIDOCAINE HYDROCHLORIDE 10 MG/ML
1 INJECTION, SOLUTION EPIDURAL; INFILTRATION; INTRACAUDAL; PERINEURAL ONCE
Status: CANCELLED | OUTPATIENT
Start: 2020-04-21 | End: 2020-04-21

## 2020-04-21 RX ORDER — SODIUM CHLORIDE, SODIUM LACTATE, POTASSIUM CHLORIDE, CALCIUM CHLORIDE 600; 310; 30; 20 MG/100ML; MG/100ML; MG/100ML; MG/100ML
INJECTION, SOLUTION INTRAVENOUS CONTINUOUS
Status: CANCELLED | OUTPATIENT
Start: 2020-04-21

## 2020-04-21 NOTE — PROGRESS NOTES
Subjective:       Patient ID: Stephan Abraham     Chief Complaint:  Colon cancer screening    HPI:  The patient location is:  Home  The chief complaint leading to consultation is:  Colon cancer screening  Visit type: audiovisual  Total time spent with patient: 30  Each patient to whom he or she provides medical services by telemedicine is:  (1) informed of the relationship between the physician and patient and the respective role of any other health care provider with respect to management of the patient; and (2) notified that he or she may decline to receive medical services by telemedicine and may withdraw from such care at any time.    Notes:  Ms. Abraham is seen today in consultation from Dr. Jim Lozano for colon cancer screening. She has no complaints. No abdominal pain, nausea, vomiting, diarrhea, constipation, melena, hematochezia, change in bowel habits, change in stool characteristics, weight loss, decrease in caliber of stool, etc. No family history of colon cancer. No aggravating or alleviating factors. No other associated symptoms.  Previous colonoscopy 13 years ago with a precancer polyp removed.  Colonoscopy and pathology reports are not available for review.        Allergies & Meds:  Review of patient's allergies indicates:   Allergen Reactions    Adhesive     Latex Hives       Current Outpatient Medications   Medication Sig Dispense Refill    aspirin 325 MG tablet Take 325 mg by mouth 2 (two) times daily.      benazepril-hydrochlorthiazide (LOTENSIN HCT) 20-12.5 mg per tablet Take 1 tablet by mouth once daily. 90 tablet 3    cyclobenzaprine (FLEXERIL) 10 MG tablet Take 1 tablet (10 mg total) by mouth 3 (three) times daily as needed for Muscle spasms. 270 tablet 3    gabapentin (NEURONTIN) 400 MG capsule Take 1 capsule (400 mg total) by mouth 3 (three) times daily. 270 capsule 3    loratadine (CLARITIN) 10 mg tablet Take 10 mg by mouth.      meloxicam (MOBIC) 15 MG tablet Take 1 tablet (15 mg  total) by mouth once daily. 90 tablet 3    pravastatin (PRAVACHOL) 20 MG tablet Take 1 tablet (20 mg total) by mouth once daily. 90 tablet 3    sodium,potassium,mag sulfates (SUPREP BOWEL PREP KIT) 17.5-3.13-1.6 gram SolR Follow written instructions provided by Clinic 2 Bottle 0    traMADol (ULTRAM) 50 mg tablet Take 1 tablet (50 mg total) by mouth every 8 (eight) hours as needed for Pain. 90 tablet 3     No current facility-administered medications for this visit.        PMFSHx:  Past Medical History:   Diagnosis Date    Allergy     Family hx of colon cancer 2013    Hyperlipidemia     Hypertension        Past Surgical History:   Procedure Laterality Date    BACK SURGERY      HYSTERECTOMY      NECK SURGERY      OOPHORECTOMY      TOTAL KNEE ARTHROPLASTY      TUBAL LIGATION         Family History   Problem Relation Age of Onset    Lung cancer Mother     Prostate cancer Father     Atrial fibrillation Father     Pacemaker/defibrilator Sister     Prostate cancer Brother     Breast cancer Neg Hx        Social History     Tobacco Use    Smoking status: Never Smoker    Smokeless tobacco: Never Used   Substance Use Topics    Alcohol use: No    Drug use: No       Review of Systems   Constitutional: Negative for appetite change, chills, fatigue, fever and unexpected weight change.   HENT: Negative for congestion, dental problem, ear pain, mouth sores, postnasal drip, rhinorrhea, sore throat, tinnitus, trouble swallowing and voice change.    Eyes: Negative for photophobia, pain, discharge and visual disturbance.   Respiratory: Negative for cough, chest tightness, shortness of breath and wheezing.    Cardiovascular: Negative for chest pain, palpitations and leg swelling.   Gastrointestinal: Negative for abdominal pain, blood in stool, constipation, diarrhea, nausea and vomiting.   Endocrine: Negative for cold intolerance, heat intolerance, polydipsia, polyphagia and polyuria.   Genitourinary: Negative for  difficulty urinating, dysuria, flank pain, frequency, hematuria and urgency.   Musculoskeletal: Negative for arthralgias, joint swelling and myalgias.   Skin: Negative for color change and rash.   Allergic/Immunologic: Negative for immunocompromised state.   Neurological: Negative for dizziness, tremors, seizures, syncope, speech difficulty, weakness, numbness and headaches.   Hematological: Negative for adenopathy. Does not bruise/bleed easily.   Psychiatric/Behavioral: Negative for agitation, confusion, hallucinations, self-injury and suicidal ideas. The patient is not nervous/anxious.             Physical Exam   Constitutional: She is oriented to person, place, and time. She appears well-developed and well-nourished. She is active.  Non-toxic appearance. No distress.   There is no height or weight on file to calculate BMI.     HENT:   Head: Normocephalic and atraumatic.   Right Ear: Hearing and external ear normal. No drainage or tenderness.   Left Ear: Hearing and external ear normal. No drainage or tenderness.   Nose: Nose normal. No rhinorrhea. No epistaxis.   Mouth/Throat: Uvula is midline, oropharynx is clear and moist and mucous membranes are normal. Mucous membranes are not pale, not dry and not cyanotic. No oropharyngeal exudate.   Eyes: Pupils are equal, round, and reactive to light. Conjunctivae and lids are normal. Right eye exhibits no discharge and no exudate. Left eye exhibits no discharge and no exudate. Right conjunctiva is not injected. Right eye exhibits no nystagmus. Left eye exhibits no nystagmus.   Neck: Trachea normal, full passive range of motion without pain and phonation normal. No JVD present. Carotid bruit is not present. No tracheal deviation present. No thyroid mass and no thyromegaly present.   Cardiovascular: Normal rate, regular rhythm, S1 normal, S2 normal, normal heart sounds and intact distal pulses. PMI is not displaced. Exam reveals no gallop and no friction rub.   No murmur  heard.  Pulmonary/Chest: Effort normal and breath sounds normal. No accessory muscle usage. No respiratory distress. She exhibits no mass, no tenderness and no crepitus. Right breast exhibits no inverted nipple, no mass, no nipple discharge, no skin change and no tenderness. Left breast exhibits no inverted nipple, no mass, no nipple discharge, no skin change and no tenderness. Breasts are symmetrical.   Abdominal: Soft. Normal appearance and bowel sounds are normal. She exhibits no distension, no fluid wave, no abdominal bruit and no mass. There is no hepatosplenomegaly. There is no tenderness. There is no rebound, no guarding and negative Jacobson's sign. No hernia. Hernia confirmed negative in the right inguinal area and confirmed negative in the left inguinal area.   Genitourinary: Rectum normal and vagina normal. There is no rash or lesion on the right labia. There is no rash or lesion on the left labia. Right adnexum displays no mass. Left adnexum displays no mass. No vaginal discharge found.   Musculoskeletal:        Cervical back: Normal.        Thoracic back: Normal.        Lumbar back: Normal.        Right upper arm: Normal.        Left upper arm: Normal.        Right forearm: Normal.        Left forearm: Normal.        Right hand: Normal.        Left hand: Normal.        Right upper leg: Normal.        Left upper leg: Normal.        Right lower leg: Normal.        Left lower leg: Normal.        Right foot: Normal.        Left foot: Normal.   Lymphadenopathy:        Head (right side): No submental, no submandibular and no posterior auricular adenopathy present.        Head (left side): No submental, no submandibular and no posterior auricular adenopathy present.     She has no cervical adenopathy.     She has no axillary adenopathy.        Right: No inguinal and no supraclavicular adenopathy present.        Left: No inguinal and no supraclavicular adenopathy present.   Neurological: She is alert and oriented  to person, place, and time. She has normal strength. No cranial nerve deficit or sensory deficit. Coordination and gait normal. GCS eye subscore is 4. GCS verbal subscore is 5. GCS motor subscore is 6.   Skin: Skin is warm, dry and intact. No rash noted. No cyanosis. Nails show no clubbing.   Psychiatric: She has a normal mood and affect. Her speech is normal. Judgment and thought content normal. Her mood appears not anxious. Her affect is not inappropriate. She is not actively hallucinating. She does not exhibit a depressed mood.           Medical Records Review:  Pending    Assessment:       Due for screening colonoscopy.  Differential diagnosis includes but not limited to colorectal cancer, diverticulosis, hemorrhoids, angiodysplasias, inflammatory bowel disease, etc.  Options include endoscopy, radiologic studies, second opinion, empiric management, observation, capsule endoscopy, etc.   Multiple comorbid issues ( hypertension, hyperlipidemia ) increase the complexity of required perioperative evaluation & management, risks of complications, and likely will increase the difficulty and complexity of postoperative care, etc.  These issues must be factored in to preoperative evaluation and perioperative management.    1. Colon cancer screening    2. Essential hypertension    3. Other hyperlipidemia          Plan:     Colon cancer screening  -     Ambulatory referral/consult to General Surgery  -     Case Request Operating Room: COLONOSCOPY  -     Comprehensive metabolic panel; Future; Expected date: 04/21/2020  -     EKG 12-lead; Future    Essential hypertension  -     CBC auto differential; Future; Expected date: 04/21/2020    Other hyperlipidemia    Other orders  -     sodium,potassium,mag sulfates (SUPREP BOWEL PREP KIT) 17.5-3.13-1.6 gram SolR; Follow written instructions provided by Clinic  Dispense: 2 Bottle; Refill: 0  -     COVID-19 Routine Screening; Standing        Follow up in about 1 month (around  5/21/2020) for routine post-endosocpy visit.    Counseling/Medical Decision Making:  Stephan Abraham was counseled regarding the results of the evaluation thus far and the differential diagnosis.  Diagnostic and therapeutic options were discussed in detail along with the risks, benefits, possible complications, details of, and indications for each option.  Diagnoses discussed included but were not limited to: hemorrhoids, diverticulosis, cancer, IBD, IBS, benign tumors, angiodysplasias.  Options discussed included but were not limited to: colonoscopy, proctoscopy, anoscopy, sigmoidoscopy, radiologic studies, PillCam, empiric therapy, second opinion, etc. Possible complications of colonoscopy discussed included but were not limited to: bleeding, infections, endocarditis, injury to internal organs, incomplete exam, failure to diagnose cancer or other serious condition, need for emergency surgery, need for a temporary colostomy, need for additional operations or procedures, etc.  Entire conversation was held in layman's terms.  Questions solicited and answered.  I read the consent form to her verbatim.  All unfamiliar terms were defined.  Krames booklets were provided on colonoscopy, polyps, diverticulosis, hemorrhoids, cancer, etc.  A copy of the consent form was provided as well for her review outside the office / hospital prior to the procedure.  At the conclusion of the conversation she voiced complete understanding of all we discussed and satisfaction that all of her questions had been answered to her full understanding.  She stated that she felt fully informed and totally capable of making an informed decision.  She desires and requests to proceed with colonoscopy.    Total face-to-face encounter time was 30 minutes with 15 minutes spent counseling the patient as outlined/summarized above.    The above order for ambulatory referral/consult to General surgery is in the plan section of my note incorrectly.  This  is the order from another provider that precipitated this office visit/encounter.  Currently there is an Epic software error that automatically pulls the referral diagnosis from the requesting provider into the plan section of this encounter note.  This order should be disregarded as part of this note/encounter.

## 2020-04-21 NOTE — LETTER
April 21, 2020      Jim Lozano MD  [address]  [place] [address]           [facility] - General [facility]  [address] [facility] [place] [address]  Phone: [phone]  Fax: [phone]          Patient: Stephan Abraham   MR Number: [id number]   YOB: 1960   Date of Visit: 4/21/2020       Dear Dr. Jim Lozano:    Thank you for referring Stephan Abraham to me for evaluation. Attached you will find relevant portions of my assessment and plan of care.    If you have questions, please do not hesitate to call me. I look forward to following Stephan Abraham along with you.    Sincerely,    Matthew Santos MD    Enclosure  CC:  No Recipients    If you would like to receive this communication electronically, please contact [email or web address] or [phone] to request more information on [employer] Link access.    For providers and/or their staff who would like to refer a patient to [facility], please contact us through our one-stop-shop provider referral line, [facility] , at [phone].    If you feel you have received this communication in error or would no longer like to receive these types of communications, please e-mail [email or web address]

## 2020-04-21 NOTE — H&P
[facility] - General Surgery  General Surgery  H&P      Patient Name: Stephan Abraham  MRN: [id number]     Chief Complaint:  I am having a screening/wellness colonoscopy    HPI:  Ms. Abraham is seen today in consultation from Dr. Jim Lozano for colon cancer screening. She has no complaints. No abdominal pain, nausea, vomiting, diarrhea, constipation, melena, hematochezia, change in bowel habits, change in stool characteristics, weight loss, decrease in caliber of stool, etc. No family history of colon cancer. No aggravating or alleviating factors. No other associated symptoms.  Previous colonoscopy 13 years ago with a precancer polyp removed.  Colonoscopy and pathology reports are not available for review.        Allergies & Meds:     Allergen Reactions    Adhesive     Latex Hives       Current Outpatient Medications   Medication Sig Dispense Refill    aspirin 325 MG tablet Take 325 mg by mouth 2 (two) times daily.      benazepril-hydrochlorthiazide (LOTENSIN HCT) 20-12.5 mg per tablet Take 1 tablet by mouth once daily. 90 tablet 3    cyclobenzaprine (FLEXERIL) 10 MG tablet Take 1 tablet (10 mg total) by mouth 3 (three) times daily as needed for Muscle spasms. 270 tablet 3    gabapentin (NEURONTIN) 400 MG capsule Take 1 capsule (400 mg total) by mouth 3 (three) times daily. 270 capsule 3    loratadine (CLARITIN) 10 mg tablet Take 10 mg by mouth.      meloxicam (MOBIC) 15 MG tablet Take 1 tablet (15 mg total) by mouth once daily. 90 tablet 3    pravastatin (PRAVACHOL) 20 MG tablet Take 1 tablet (20 mg total) by mouth once daily. 90 tablet 3    sodium,potassium,mag sulfates (SUPREP BOWEL PREP KIT) 17.5-3.13-1.6 gram SolR Follow written instructions provided by Clinic 2 Bottle 0    traMADol (ULTRAM) 50 mg tablet Take 1 tablet (50 mg total) by mouth every 8 (eight) hours as needed for Pain. 90 tablet 3         PMFSHx:  Past Medical History:   Diagnosis Date    Allergy     Family  hx of colon cancer 2013    Hyperlipidemia     Hypertension        Past Surgical History:   Procedure Laterality Date    BACK SURGERY      HYSTERECTOMY      NECK SURGERY      OOPHORECTOMY      TOTAL KNEE ARTHROPLASTY      TUBAL LIGATION         Family History   Problem Relation Age of Onset    Lung cancer Mother     Prostate cancer Father     Atrial fibrillation Father     Pacemaker/defibrilator Sister     Prostate cancer Brother     Breast cancer Neg Hx        Social History     Tobacco Use    Smoking status: Never Smoker    Smokeless tobacco: Never Used   Substance Use Topics    Alcohol use: No    Drug use: No       Review of Systems   Constitutional: Negative for appetite change, chills, fatigue, fever and unexpected weight change.   HENT: Negative for congestion, dental problem, ear pain, mouth sores, postnasal drip, rhinorrhea, sore throat, tinnitus, trouble swallowing and voice change.    Eyes: Negative for photophobia, pain, discharge and visual disturbance.   Respiratory: Negative for cough, chest tightness, shortness of breath and wheezing.   Cardiovascular: Negative for chest pain, palpitations and leg swelling.   Gastrointestinal: Negative for abdominal pain, blood in stool, constipation, diarrhea, nausea and vomiting.   Endocrine: Negative for cold intolerance, heat intolerance, polydipsia, polyphagia and polyuria.   Genitourinary: Negative for difficulty urinating, dysuria, flank pain, frequency, hematuria and urgency.   Musculoskeletal: Negative for arthralgias, joint swelling and myalgias.   Skin: Negative for color change and rash.   Allergic/Immunologic: Negative for immunocompromised state.   Neurological: Negative for dizziness, tremors, seizures, syncope, speech difficulty, weakness, numbness and headaches.   Hematological: Negative for adenopathy. Does not bruise/bleed easily.   Psychiatric/Behavioral: Negative for agitation, confusion, hallucinations, self-injury and suicidal  ideas. The patient is not nervous/anxious.             Physical Exam   Constitutional: She is oriented to person, place, and time. She appears well-developed and well-nourished. She is active.  Non-toxic appearance. No distress.    HENT: Head: Normocephalic and atraumatic.   Right Ear: Hearing and external ear normal. No drainage or tenderness.   Left Ear: Hearing and external ear normal. No drainage or tenderness.   Nose: Nose normal. No rhinorrhea. No epistaxis.   Mouth/Throat: Uvula is midline, oropharynx is clear and moist and mucous membranes are normal. Mucous membranes are not pale, not dry and not cyanotic. No oropharyngeal exudate.   Eyes: Pupils are equal, round, and reactive to light. Conjunctivae and lids are normal. Right eye exhibits no discharge and no exudate. Left eye exhibits no discharge and no exudate. Right conjunctiva is not injected. Right eye exhibits no nystagmus. Left eye exhibits no nystagmus.   Neck: Trachea normal, full passive range of motion without pain and phonation normal. No JVD present. Carotid bruit is not present. No tracheal deviation present. No thyroid mass and no thyromegaly present.   Cardiovascular: Normal rate, regular rhythm, S1 normal, S2 normal, normal heart sounds and intact distal pulses. PMI is not displaced. Exam reveals no gallop and no friction rub.   No murmur heard.  Pulmonary/Chest: Effort normal and breath sounds normal. No accessory muscle usage. No respiratory distress. She exhibits no mass, no tenderness and no crepitus. Right breast exhibits no inverted nipple, no mass, no nipple discharge, no skin change and no tenderness. Left breast exhibits no inverted nipple, no mass, no nipple discharge, no skin change and no tenderness. Breasts are symmetrical.   Abdominal: Soft. Normal appearance and bowel sounds are normal. She exhibits no distension, no fluid wave, no abdominal bruit and no mass. There is no hepatosplenomegaly. There is no tenderness. There is  no rebound, no guarding and negative Jacobson's sign. No hernia. Hernia confirmed negative in the right inguinal area and confirmed negative in the left inguinal area.   Genitourinary: Rectum normal and vagina normal. There is no rash or lesion on the right labia. There is no rash or lesion on the left labia. Right adnexum displays no mass. Left adnexum displays no mass. No vaginal discharge found.   Musculoskeletal:        Cervical back: Normal.        Thoracic back: Normal.        Lumbar back: Normal.        Right upper arm: Normal.        Left upper arm: Normal.        Right forearm: Normal.        Left forearm: Normal.        Right hand: Normal.        Left hand: Normal.        Right upper leg: Normal.        Left upper leg: Normal.        Right lower leg: Normal.        Left lower leg: Normal.        Right foot: Normal.        Left foot: Normal.   Lymphadenopathy:        Head (right side): No submental, no submandibular and no posterior auricular adenopathy present.        Head (left side): No submental, no submandibular and no posterior auricular adenopathy present.     She has no cervical adenopathy.     She has no axillary adenopathy.        Right: No inguinal and no supraclavicular adenopathy present.        Left: No inguinal and no supraclavicular adenopathy present.   Neurological: She is alert and oriented to person, place, and time. She has normal strength. No cranial nerve deficit or sensory deficit. Coordination and gait normal. GCS eye subscore is 4. GCS verbal subscore is 5. GCS motor subscore is 6.   Skin: Skin is warm, dry and intact. No rash noted. No cyanosis. Nails show no clubbing.   Psychiatric: She has a normal mood and affect. Her speech is normal. Judgment and thought content normal. Her mood appears not anxious. Her affect is not inappropriate. She is not actively hallucinating. She does not exhibit a depressed mood.           Medical Records Review:  Pending    Assessment:       Due for  screening colonoscopy.  Differential diagnosis includes but not limited to colorectal cancer, diverticulosis, hemorrhoids, angiodysplasias, inflammatory bowel disease, etc.  Options include endoscopy, radiologic studies, second opinion, empiric management, observation, capsule endoscopy, etc.   Multiple comorbid issues ( hypertension, hyperlipidemia ) increase the complexity of required perioperative evaluation & management, risks of complications, and likely will increase the difficulty and complexity of postoperative care, etc.  These issues must be factored in to preoperative evaluation and perioperative management.    1. Colon cancer screening    2. Essential hypertension    3. Other hyperlipidemia          Plan:     Colon cancer screening  -     Case Request Operating Room: COLONOSCOPY  -     Comprehensive metabolic panel; Future; Expected date: 04/21/2020  -     EKG 12-lead; Future    Essential hypertension  -     CBC auto differential; Future; Expected date: 04/21/2020    Other hyperlipidemia    Other orders  -     sodium,potassium,mag sulfates (SUPREP BOWEL PREP KIT) 17.5-3.13-1.6 gram SolR; Follow written instructions provided by Clinic  Dispense: 2 Bottle; Refill: 0  -     COVID-19 Routine Screening; Standing        Follow up around 5/21/2020 for routine post-endosocpy visit.    Counseling/Medical Decision Making:  Stephan Abraham was counseled regarding the results of the evaluation thus far and the differential diagnosis.  Diagnostic and therapeutic options were discussed in detail along with the risks, benefits, possible complications, details of, and indications for each option.  Diagnoses discussed included but were not limited to: hemorrhoids, diverticulosis, cancer, IBD, IBS, benign tumors, angiodysplasias.  Options discussed included but were not limited to: colonoscopy, proctoscopy, anoscopy, sigmoidoscopy, radiologic studies, PillCam, empiric therapy, second opinion, etc. Possible complications of  colonoscopy discussed included but were not limited to: bleeding, infections, endocarditis, injury to internal organs, incomplete exam, failure to diagnose cancer or other serious condition, need for emergency surgery, need for a temporary colostomy, need for additional operations or procedures, etc.  Entire conversation was held in layman's terms.  Questions solicited and answered.  I read the consent form to her verbatim.  All unfamiliar terms were defined.  Krames booklets were provided on colonoscopy, polyps, diverticulosis, hemorrhoids, cancer, etc.  A copy of the consent form was provided as well for her review outside the office / hospital prior to the procedure.  At the conclusion of the conversation she voiced complete understanding of all we discussed and satisfaction that all of her questions had been answered to her full understanding.  She stated that she felt fully informed and totally capable of making an informed decision.  She desires and requests to proceed with colonoscopy.

## 2020-05-01 ENCOUNTER — HOSPITAL ENCOUNTER (OUTPATIENT)
Dept: CARDIOLOGY | Facility: HOSPITAL | Age: 60
Discharge: HOME | End: 2020-05-01
Attending: SURGERY
Payer: MEDICARE

## 2020-05-01 DIAGNOSIS — Z12.11 COLON CANCER SCREENING: ICD-10-CM

## 2020-05-01 PROCEDURE — 93005 ELECTROCARDIOGRAM TRACING: CPT

## 2020-05-05 ENCOUNTER — PATIENT MESSAGE (OUTPATIENT)
Dept: ADMINISTRATIVE | Facility: HOSPITAL | Age: 60
End: 2020-05-05

## 2020-05-07 ENCOUNTER — TELEPHONE (OUTPATIENT)
Dept: SURGERY | Facility: CLINIC | Age: 60
End: 2020-05-07

## 2020-05-07 NOTE — TELEPHONE ENCOUNTER
----- Message from Elgin Rios sent at 5/7/2020  2:21 PM CDT -----  Type: Needs Medical Advice  Who Called:  Patient    Best Call Back Number: [phone]  Additional Information: Patient states that she would like a callback regarding her colonoscopy

## 2020-05-18 ENCOUNTER — PATIENT OUTREACH (OUTPATIENT)
Dept: ADMINISTRATIVE | Facility: HOSPITAL | Age: 60
End: 2020-05-18

## 2020-05-20 ENCOUNTER — PATIENT MESSAGE (OUTPATIENT)
Dept: ADMINISTRATIVE | Facility: HOSPITAL | Age: 60
End: 2020-05-20

## 2020-05-21 ENCOUNTER — TELEPHONE (OUTPATIENT)
Dept: SURGERY | Facility: CLINIC | Age: 60
End: 2020-05-21

## 2020-05-21 NOTE — TELEPHONE ENCOUNTER
----- Message from Sara York sent at 5/21/2020  2:28 PM CDT -----  Contact: Patient  Type: Needs Medical Advice    Who Called:  Patient    Best Call Back Number: [phone]    Additional Information:   Patient called to cancel post-op due to not having colonoscopy done.  Patient is requesting a call back to see if the mail in screening is possible?

## 2020-05-28 ENCOUNTER — TELEPHONE (OUTPATIENT)
Dept: SURGERY | Facility: CLINIC | Age: 60
End: 2020-05-28

## 2020-05-28 DIAGNOSIS — Z12.11 COLON CANCER SCREENING: Primary | ICD-10-CM

## 2020-05-28 NOTE — TELEPHONE ENCOUNTER
"Phoned pt regarding rescheduling colonoscopy procedure.  Pt states that she would like to "hold off a little while" because her son is in the hospital, and she is also taking care of her elderly father.  She states she will call the clinic back when she is ready to reschedule.   "

## 2020-06-01 ENCOUNTER — OFFICE VISIT (OUTPATIENT)
Dept: FAMILY MEDICINE | Facility: CLINIC | Age: 60
End: 2020-06-01
Payer: MEDICARE

## 2020-06-01 VITALS
WEIGHT: 233.63 LBS | RESPIRATION RATE: 16 BRPM | TEMPERATURE: 98 F | SYSTOLIC BLOOD PRESSURE: 133 MMHG | BODY MASS INDEX: 35.41 KG/M2 | DIASTOLIC BLOOD PRESSURE: 67 MMHG | OXYGEN SATURATION: 96 % | HEART RATE: 86 BPM | HEIGHT: 68 IN

## 2020-06-01 DIAGNOSIS — Z12.11 COLON CANCER SCREENING: ICD-10-CM

## 2020-06-01 DIAGNOSIS — R21 RASH: Primary | ICD-10-CM

## 2020-06-01 PROCEDURE — 3008F PR BODY MASS INDEX (BMI) DOCUMENTED: ICD-10-PCS | Mod: CPTII,S$GLB,, | Performed by: FAMILY MEDICINE

## 2020-06-01 PROCEDURE — 99213 OFFICE O/P EST LOW 20 MIN: CPT | Mod: S$GLB,,, | Performed by: FAMILY MEDICINE

## 2020-06-01 PROCEDURE — 99213 PR OFFICE/OUTPT VISIT, EST, LEVL III, 20-29 MIN: ICD-10-PCS | Mod: S$GLB,,, | Performed by: FAMILY MEDICINE

## 2020-06-01 PROCEDURE — 3008F BODY MASS INDEX DOCD: CPT | Mod: CPTII,S$GLB,, | Performed by: FAMILY MEDICINE

## 2020-06-01 PROCEDURE — 99999 PR PBB SHADOW E&M-EST. PATIENT-LVL III: CPT | Mod: PBBFAC,,, | Performed by: FAMILY MEDICINE

## 2020-06-01 PROCEDURE — 3075F SYST BP GE 130 - 139MM HG: CPT | Mod: CPTII,S$GLB,, | Performed by: FAMILY MEDICINE

## 2020-06-01 PROCEDURE — 99999 PR PBB SHADOW E&M-EST. PATIENT-LVL III: ICD-10-PCS | Mod: PBBFAC,,, | Performed by: FAMILY MEDICINE

## 2020-06-01 PROCEDURE — 3078F PR MOST RECENT DIASTOLIC BLOOD PRESSURE < 80 MM HG: ICD-10-PCS | Mod: CPTII,S$GLB,, | Performed by: FAMILY MEDICINE

## 2020-06-01 PROCEDURE — 3075F PR MOST RECENT SYSTOLIC BLOOD PRESS GE 130-139MM HG: ICD-10-PCS | Mod: CPTII,S$GLB,, | Performed by: FAMILY MEDICINE

## 2020-06-01 PROCEDURE — 3078F DIAST BP <80 MM HG: CPT | Mod: CPTII,S$GLB,, | Performed by: FAMILY MEDICINE

## 2020-06-01 RX ORDER — TRIAMCINOLONE ACETONIDE 1 MG/G
CREAM TOPICAL 2 TIMES DAILY
COMMUNITY

## 2020-06-01 RX ORDER — TRIAMCINOLONE ACETONIDE 1 MG/G
CREAM TOPICAL 2 TIMES DAILY
Qty: 80 TUBE | Refills: 1 | Status: CANCELLED | OUTPATIENT
Start: 2020-06-01

## 2020-06-01 RX ORDER — NYSTATIN AND TRIAMCINOLONE ACETONIDE 100000; 1 [USP'U]/G; MG/G
OINTMENT TOPICAL 2 TIMES DAILY
Qty: 60 G | Refills: 5 | Status: SHIPPED | OUTPATIENT
Start: 2020-06-01

## 2020-06-01 NOTE — PROGRESS NOTES
"Subjective:       Patient ID: Stephan Abraham is a 59 y.o. female.    Chief Complaint: Follow-up (Pt would like fitkit, will get tetanus vaccination @ pharmacy); Rash; and Nail Problem    Rash  Minor  b legs  Non-pruritic  Non-painful    Review of Systems   Constitutional: Negative for activity change, appetite change, chills, fatigue and fever.   HENT: Negative for congestion, dental problem, facial swelling, nosebleeds, postnasal drip, sinus pain, sore throat, trouble swallowing and voice change.    Eyes: Negative for pain, discharge and visual disturbance.   Respiratory: Negative for apnea, cough, chest tightness and shortness of breath.    Cardiovascular: Negative for chest pain and palpitations.   Gastrointestinal: Negative for abdominal pain, blood in stool, constipation and nausea.   Endocrine: Negative for cold intolerance, polydipsia and polyuria.   Genitourinary: Negative for difficulty urinating, enuresis and flank pain.   Musculoskeletal: Negative for arthralgias and back pain.   Skin: Positive for rash. Negative for color change.   Allergic/Immunologic: Negative for environmental allergies and immunocompromised state.   Neurological: Negative for dizziness and light-headedness.   Hematological: Negative for adenopathy.   Psychiatric/Behavioral: Negative for agitation, behavioral problems, decreased concentration and dysphoric mood. The patient is not nervous/anxious.    All other systems reviewed and are negative.        Reviewed family, medical, surgical, and social history.    Objective:      /67 (BP Location: Left arm, Patient Position: Sitting, BP Method: Large (Automatic))   Pulse 86   Temp 98.2 °F (36.8 °C) (Oral)   Resp 16   Ht 5' 8" (1.727 m)   Wt 106 kg (233 lb 9.6 oz)   LMP  (LMP Unknown)   SpO2 96%   BMI 35.52 kg/m²   Physical Exam   Constitutional: She is oriented to person, place, and time. She appears well-developed and well-nourished. No distress.   HENT:   Head: " Normocephalic and atraumatic.   Nose: Nose normal.   Mouth/Throat: Oropharynx is clear and moist. No oropharyngeal exudate.   Eyes: Pupils are equal, round, and reactive to light. Conjunctivae and EOM are normal. No scleral icterus.   Neck: Normal range of motion. Neck supple. No thyromegaly present.   Cardiovascular: Normal rate, regular rhythm and normal heart sounds. Exam reveals no gallop and no friction rub.   No murmur heard.  Pulmonary/Chest: Effort normal and breath sounds normal. No respiratory distress. She has no wheezes. She has no rales. She exhibits no tenderness.   Abdominal: Soft. Bowel sounds are normal. She exhibits no distension. There is no tenderness. There is no guarding.   Musculoskeletal: Normal range of motion. She exhibits no edema, tenderness or deformity.   Lymphadenopathy:     She has no cervical adenopathy.   Neurological: She is alert and oriented to person, place, and time. She displays normal reflexes. No cranial nerve deficit or sensory deficit. She exhibits normal muscle tone.   Skin: Skin is warm and dry. Rash noted. She is not diaphoretic. No erythema. No pallor.   Psychiatric: She has a normal mood and affect. Her behavior is normal. Judgment and thought content normal.   Nursing note and vitals reviewed.      Assessment:       1. Rash    2. Colon cancer screening        Plan:       Rash  -     nystatin-triamcinolone (MYCOLOG) ointment; Apply topically 2 (two) times daily.  Dispense: 60 g; Refill: 5    Colon cancer screening  -     Fecal Immunochemical Test (iFOBT); Future; Expected date: 06/01/2020            Risks, benefits, and side effects were discussed with the patient. All questions were answered to the fullest satisfaction of the patient, and pt verbalized understanding and agreement to treatment plan. Pt was to call with any new or worsening symptoms, or present to the ER.

## 2020-08-07 ENCOUNTER — HOSPITAL ENCOUNTER (EMERGENCY)
Facility: HOSPITAL | Age: 60
Discharge: HOME | End: 2020-08-07
Attending: EMERGENCY MEDICINE
Payer: MEDICARE

## 2020-08-07 VITALS
BODY MASS INDEX: 30.31 KG/M2 | DIASTOLIC BLOOD PRESSURE: 52 MMHG | HEART RATE: 103 BPM | SYSTOLIC BLOOD PRESSURE: 111 MMHG | HEIGHT: 68 IN | RESPIRATION RATE: 16 BRPM | OXYGEN SATURATION: 95 % | WEIGHT: 200 LBS | TEMPERATURE: 101 F

## 2020-08-07 DIAGNOSIS — R31.9 URINARY TRACT INFECTION WITH HEMATURIA, SITE UNSPECIFIED: Primary | ICD-10-CM

## 2020-08-07 DIAGNOSIS — R52 BODY ACHES: ICD-10-CM

## 2020-08-07 DIAGNOSIS — N39.0 URINARY TRACT INFECTION WITH HEMATURIA, SITE UNSPECIFIED: Primary | ICD-10-CM

## 2020-08-07 DIAGNOSIS — R50.9 FEVER, UNSPECIFIED FEVER CAUSE: ICD-10-CM

## 2020-08-07 LAB
ALBUMIN SERPL BCP-MCNC: 3.8 G/DL (ref 3.5–5.2)
ALP SERPL-CCNC: 105 U/L (ref 55–135)
ALT SERPL W/O P-5'-P-CCNC: 37 U/L (ref 10–44)
ANION GAP SERPL CALC-SCNC: 10 MMOL/L (ref 8–16)
AST SERPL-CCNC: 32 U/L (ref 10–40)
BACTERIA #/AREA URNS HPF: ABNORMAL /HPF
BASOPHILS # BLD AUTO: 0.04 K/UL (ref 0–0.2)
BASOPHILS NFR BLD: 0.2 % (ref 0–1.9)
BILIRUB SERPL-MCNC: 1.2 MG/DL (ref 0.1–1)
BILIRUB UR QL STRIP: NEGATIVE
BUN SERPL-MCNC: 21 MG/DL (ref 6–20)
CALCIUM SERPL-MCNC: 8.9 MG/DL (ref 8.7–10.5)
CHLORIDE SERPL-SCNC: 103 MMOL/L (ref 95–110)
CLARITY UR: ABNORMAL
CO2 SERPL-SCNC: 24 MMOL/L (ref 23–29)
COLOR UR: YELLOW
CREAT SERPL-MCNC: 1.4 MG/DL (ref 0.5–1.4)
DIFFERENTIAL METHOD: ABNORMAL
EOSINOPHIL # BLD AUTO: 0 K/UL (ref 0–0.5)
EOSINOPHIL NFR BLD: 0.2 % (ref 0–8)
ERYTHROCYTE [DISTWIDTH] IN BLOOD BY AUTOMATED COUNT: 13.5 % (ref 11.5–14.5)
EST. GFR  (AFRICAN AMERICAN): 47.4 ML/MIN/1.73 M^2
EST. GFR  (NON AFRICAN AMERICAN): 41.2 ML/MIN/1.73 M^2
GLUCOSE SERPL-MCNC: 115 MG/DL (ref 70–110)
GLUCOSE UR QL STRIP: NEGATIVE
HCT VFR BLD AUTO: 34.8 % (ref 37–48.5)
HGB BLD-MCNC: 11.8 G/DL (ref 12–16)
HGB UR QL STRIP: ABNORMAL
HYALINE CASTS #/AREA URNS LPF: 0 /LPF
IMM GRANULOCYTES # BLD AUTO: 0.09 K/UL (ref 0–0.04)
IMM GRANULOCYTES NFR BLD AUTO: 0.5 % (ref 0–0.5)
KETONES UR QL STRIP: NEGATIVE
LACTATE SERPL-SCNC: 1.3 MMOL/L (ref 0.5–2.2)
LEUKOCYTE ESTERASE UR QL STRIP: ABNORMAL
LYMPHOCYTES # BLD AUTO: 2.7 K/UL (ref 1–4.8)
LYMPHOCYTES NFR BLD: 16.6 % (ref 18–48)
MCH RBC QN AUTO: 28.7 PG (ref 27–31)
MCHC RBC AUTO-ENTMCNC: 33.9 G/DL (ref 32–36)
MCV RBC AUTO: 85 FL (ref 82–98)
MICROSCOPIC COMMENT: ABNORMAL
MONOCYTES # BLD AUTO: 1.4 K/UL (ref 0.3–1)
MONOCYTES NFR BLD: 8.2 % (ref 4–15)
NEUTROPHILS # BLD AUTO: 12.2 K/UL (ref 1.8–7.7)
NEUTROPHILS NFR BLD: 74.3 % (ref 38–73)
NITRITE UR QL STRIP: NEGATIVE
NRBC BLD-RTO: 0 /100 WBC
PH UR STRIP: 7 [PH] (ref 5–8)
PLATELET # BLD AUTO: 306 K/UL (ref 150–350)
PMV BLD AUTO: 9.3 FL (ref 9.2–12.9)
POTASSIUM SERPL-SCNC: 4.1 MMOL/L (ref 3.5–5.1)
PROT SERPL-MCNC: 8 G/DL (ref 6–8.4)
PROT UR QL STRIP: ABNORMAL
RBC # BLD AUTO: 4.11 M/UL (ref 4–5.4)
RBC #/AREA URNS HPF: 15 /HPF (ref 0–4)
SARS-COV-2 RDRP RESP QL NAA+PROBE: NEGATIVE
SODIUM SERPL-SCNC: 137 MMOL/L (ref 136–145)
SP GR UR STRIP: 1.01 (ref 1–1.03)
URN SPEC COLLECT METH UR: ABNORMAL
UROBILINOGEN UR STRIP-ACNC: NEGATIVE EU/DL
WBC # BLD AUTO: 16.5 K/UL (ref 3.9–12.7)
WBC #/AREA URNS HPF: >100 /HPF (ref 0–5)

## 2020-08-07 PROCEDURE — 96360 HYDRATION IV INFUSION INIT: CPT

## 2020-08-07 PROCEDURE — 87086 URINE CULTURE/COLONY COUNT: CPT

## 2020-08-07 PROCEDURE — 83605 ASSAY OF LACTIC ACID: CPT

## 2020-08-07 PROCEDURE — 99284 EMERGENCY DEPT VISIT MOD MDM: CPT | Mod: 25

## 2020-08-07 PROCEDURE — 80053 COMPREHEN METABOLIC PANEL: CPT

## 2020-08-07 PROCEDURE — 87088 URINE BACTERIA CULTURE: CPT

## 2020-08-07 PROCEDURE — 85025 COMPLETE CBC W/AUTO DIFF WBC: CPT

## 2020-08-07 PROCEDURE — 81000 URINALYSIS NONAUTO W/SCOPE: CPT

## 2020-08-07 PROCEDURE — 87186 SC STD MICRODIL/AGAR DIL: CPT

## 2020-08-07 PROCEDURE — 87040 BLOOD CULTURE FOR BACTERIA: CPT

## 2020-08-07 PROCEDURE — U0002 COVID-19 LAB TEST NON-CDC: HCPCS

## 2020-08-07 PROCEDURE — 25000003 PHARM REV CODE 250: Performed by: PHYSICIAN ASSISTANT

## 2020-08-07 PROCEDURE — 87077 CULTURE AEROBIC IDENTIFY: CPT

## 2020-08-07 RX ORDER — CEPHALEXIN 500 MG/1
500 CAPSULE ORAL EVERY 12 HOURS
Qty: 14 CAPSULE | Refills: 0 | Status: SHIPPED | OUTPATIENT
Start: 2020-08-07 | End: 2020-08-14

## 2020-08-07 RX ORDER — IBUPROFEN 600 MG/1
600 TABLET ORAL
Status: COMPLETED | OUTPATIENT
Start: 2020-08-07 | End: 2020-08-07

## 2020-08-07 RX ORDER — ACETAMINOPHEN 500 MG
1000 TABLET ORAL
Status: COMPLETED | OUTPATIENT
Start: 2020-08-07 | End: 2020-08-07

## 2020-08-07 RX ADMIN — ACETAMINOPHEN 1000 MG: 500 TABLET, FILM COATED ORAL at 05:08

## 2020-08-07 RX ADMIN — SODIUM CHLORIDE 1000 ML: 0.9 INJECTION, SOLUTION INTRAVENOUS at 07:08

## 2020-08-07 RX ADMIN — IBUPROFEN 600 MG: 600 TABLET ORAL at 06:08

## 2020-08-08 NOTE — ED PROVIDER NOTES
Please note that my documentation in this Electronic Healthcare Record was produced using speech recognition software and therefore may contain errors related to that software.These could include grammar, punctuation and spelling errors or the inclusion/ exclusion of phrases that were not intended. Please contact myself for any clarification, questions or concerns.    HPI: Patient is a 60 y.o. female who presents with the chief complaint of body aches and a fever that began this morning.  Patient states she just generally felt unwell.  The endorse a decreased appetite.  Denied any cough, congestion, rhinorrhea, chest pain, difficulty breathing, abdominal pain, urinary complaints.  Patient does have known history of hypertension, hyperlipidemia.  Does take medication regularly.  Patient then decided to mention to the nurse that she was having dysuria even after I initially asked.  States that has been occurring for the last 2 days.  Denies any vaginal discharge or odor.  States she does have some pressure in the lower abdomen denies any other abdominal pain, diarrhea, or constipation.    REVIEW OF SYSTEMS - 10 systems were independently reviewed and are otherwise negative with the exception of those items previously documented in the HPI and nursing notes.    Allergy: Adhesive and Latex    Past medical history:   Past Medical History:   Diagnosis Date    Allergy     Family hx of colon cancer 2013    Hyperlipidemia     Hypertension        Surgical History:   Past Surgical History:   Procedure Laterality Date    BACK SURGERY      HYSTERECTOMY      NECK SURGERY      OOPHORECTOMY      TOTAL KNEE ARTHROPLASTY      TUBAL LIGATION         Social history:   Social History     Socioeconomic History    Marital status: Single     Spouse name: Not on file    Number of children: Not on file    Years of education: Not on file    Highest education level: Not on file   Occupational History    Not on file   Social  "Needs    Financial resource strain: Not on file    Food insecurity     Worry: Not on file     Inability: Not on file    Transportation needs     Medical: Not on file     Non-medical: Not on file   Tobacco Use    Smoking status: Never Smoker    Smokeless tobacco: Never Used   Substance and Sexual Activity    Alcohol use: No    Drug use: No    Sexual activity: Yes     Partners: Male   Lifestyle    Physical activity     Days per week: Not on file     Minutes per session: Not on file    Stress: Not on file   Relationships    Social connections     Talks on phone: Not on file     Gets together: Not on file     Attends Zoroastrianism service: Not on file     Active member of club or organization: Not on file     Attends meetings of clubs or organizations: Not on file     Relationship status: Not on file   Other Topics Concern    Not on file   Social History Narrative    Not on file       Family history: non-contributory    EHR: reviewed    Vitals: /60   Pulse 108   Temp (!) 101.1 °F (38.4 °C) (Oral)   Resp 16   Ht 5' 8" (1.727 m)   Wt 90.7 kg (200 lb)   LMP  (LMP Unknown)   SpO2 95%   Breastfeeding No   BMI 30.41 kg/m²     PHYSICAL EXAM:    General-60-year-old female awake and alert, oriented, GCS 15, in no acute distress,  HEENT- normocephalic, atraumatic, sclera anicteric, moist mucous membranes, PERRL, EOMI  CARDIOVASCULAR- tachycardic but regular rhythm, no murmurs/rubs,/gallops, normal S1-S2  PULMONARY- nonlabored, no respiratory distress, clear to auscultation bilaterally, no wheezes/rhonchi/rales, chest expansion symmetrical  GASTROINTESTINAL- soft, nontender, nondistended, no rigidity, rebound, or guarding, no CVA tenderness  NEUROLOGIC- mental status normal, speech fluid, cognition normal, CN II-XII grossly intact, sensations equal normal bilateral upper and lower extremities, peripheral pulse 2 +/4, ambulatory with proper gait.  MUSCULOSKELETAL- well-nourished, " well-developed  DERMATOLOGIC- warm and dry, no visible rashes  PSYCHIATRIC- normal affect, normal concentration           Labs Reviewed   URINALYSIS, REFLEX TO URINE CULTURE - Abnormal; Notable for the following components:       Result Value    Appearance, UA Hazy (*)     Protein, UA 2+ (*)     Occult Blood UA 2+ (*)     Leukocytes, UA 3+ (*)     All other components within normal limits    Narrative:     Specimen Source->Urine   CBC W/ AUTO DIFFERENTIAL - Abnormal; Notable for the following components:    WBC 16.50 (*)     Hemoglobin 11.8 (*)     Hematocrit 34.8 (*)     Gran # (ANC) 12.2 (*)     Immature Grans (Abs) 0.09 (*)     Mono # 1.4 (*)     Gran% 74.3 (*)     Lymph% 16.6 (*)     All other components within normal limits   COMPREHENSIVE METABOLIC PANEL - Abnormal; Notable for the following components:    Glucose 115 (*)     BUN, Bld 21 (*)     Total Bilirubin 1.2 (*)     eGFR if  47.4 (*)     eGFR if non  41.2 (*)     All other components within normal limits   URINALYSIS MICROSCOPIC - Abnormal; Notable for the following components:    RBC, UA 15 (*)     WBC, UA >100 (*)     Bacteria Few (*)     All other components within normal limits    Narrative:     Specimen Source->Urine   CULTURE, BLOOD   CULTURE, BLOOD   CULTURE, URINE   SARS-COV-2 RNA AMPLIFICATION, QUAL   LACTIC ACID, PLASMA       No orders to display       MEDICAL DECISION MAKING: Patient is a 60 y.o. female who presented with chief complaint of body aches and fever.  She was potentially concern over having a normal Coronavirus.  Denied any other URI type symptoms.  She was initially febrile and was given Tylenol.  After the COVID test was negative, patient decided to mention that she was also having some urinary complaints.  Septic workup was initiated given her fever and tachycardia.  She was given a L of fluids, ibuprofen which did improve her tachycardia and her fever.  Her fever is trending down.  While in the  room, her heart rate was under 100.  Patient states she is feeling better after the medication and the fluids.  Urinalysis is positive for infection with leukocytes and hematuria.  Renal function with slight elevation in BUN 21 and GFR 47.4.  This could be related to urinary infection versus dehydration.  She does have a white count of 16.5 but her lactic acid is within normal range.  She did not have any abdominal tenderness palpation and therefore CT imaging was not performed.  She is tolerating p.o. intake.  I believe her fever and abnormal vital signs are most likely related to urinary infection.  Low suspicion for pyelonephritis as she has no CVA tenderness.  Could potentially have another viral illness.  Because of the benign abdominal exam, low suspicion for diverticulitis, appendicitis, ulcerative colitis, mesenteric ischemia, cholecystitis.  Patient is advised to continue Tylenol and ibuprofen for fever and if any point she develops worsening symptoms or fevers that do not improve with medication, she should return immediately.  Patient verbalizes her understanding and agrees to this plan.    CLINICAL IMPRESSION:  1. Urinary tract infection with hematuria, site unspecified    2. Fever, unspecified fever cause    3. Body aches         WAQAR Chavira  08/07/20 2016       WAQAR Chavira  08/07/20 2024       Gideon Maria MD  08/15/20 1905

## 2020-08-08 NOTE — DISCHARGE INSTRUCTIONS
Take the medications as prescribed.  Continue to fluid hydrate.  Alternate Tylenol and ibuprofen for fever.  Return for any worsening or new symptoms. Follow up with Primary Care Provider in the next 2-3 days.

## 2020-08-10 LAB — BACTERIA UR CULT: ABNORMAL

## 2020-08-13 LAB — BACTERIA BLD CULT: NORMAL

## 2020-09-04 ENCOUNTER — OFFICE VISIT (OUTPATIENT)
Dept: FAMILY MEDICINE | Facility: CLINIC | Age: 60
End: 2020-09-04
Payer: MEDICARE

## 2020-09-04 ENCOUNTER — LAB VISIT (OUTPATIENT)
Dept: LAB | Facility: HOSPITAL | Age: 60
End: 2020-09-04
Attending: FAMILY MEDICINE
Payer: MEDICARE

## 2020-09-04 VITALS
TEMPERATURE: 98 F | RESPIRATION RATE: 18 BRPM | WEIGHT: 235.38 LBS | HEART RATE: 81 BPM | BODY MASS INDEX: 35.67 KG/M2 | DIASTOLIC BLOOD PRESSURE: 68 MMHG | OXYGEN SATURATION: 96 % | SYSTOLIC BLOOD PRESSURE: 127 MMHG | HEIGHT: 68 IN

## 2020-09-04 DIAGNOSIS — M47.12 OSTEOARTHRITIS OF CERVICAL SPINE WITH MYELOPATHY: ICD-10-CM

## 2020-09-04 DIAGNOSIS — N30.00 ACUTE CYSTITIS WITHOUT HEMATURIA: ICD-10-CM

## 2020-09-04 DIAGNOSIS — R73.9 HYPERGLYCEMIA: ICD-10-CM

## 2020-09-04 DIAGNOSIS — E78.5 HYPERLIPIDEMIA, UNSPECIFIED HYPERLIPIDEMIA TYPE: ICD-10-CM

## 2020-09-04 DIAGNOSIS — M25.50 ARTHRALGIA, UNSPECIFIED JOINT: ICD-10-CM

## 2020-09-04 DIAGNOSIS — N30.00 ACUTE CYSTITIS WITHOUT HEMATURIA: Primary | ICD-10-CM

## 2020-09-04 LAB
ALBUMIN SERPL BCP-MCNC: 3.9 G/DL (ref 3.5–5.2)
ALP SERPL-CCNC: 99 U/L (ref 55–135)
ALT SERPL W/O P-5'-P-CCNC: 26 U/L (ref 10–44)
ANION GAP SERPL CALC-SCNC: 8 MMOL/L (ref 8–16)
AST SERPL-CCNC: 24 U/L (ref 10–40)
BACTERIA #/AREA URNS HPF: ABNORMAL /HPF
BASOPHILS # BLD AUTO: 0.03 K/UL (ref 0–0.2)
BASOPHILS NFR BLD: 0.4 % (ref 0–1.9)
BILIRUB SERPL-MCNC: 1.4 MG/DL (ref 0.1–1)
BILIRUB UR QL STRIP: NEGATIVE
BUN SERPL-MCNC: 16 MG/DL (ref 6–20)
CALCIUM SERPL-MCNC: 8.7 MG/DL (ref 8.7–10.5)
CHLORIDE SERPL-SCNC: 101 MMOL/L (ref 95–110)
CHOLEST SERPL-MCNC: 186 MG/DL (ref 120–199)
CHOLEST/HDLC SERPL: 5.3 {RATIO} (ref 2–5)
CLARITY UR: ABNORMAL
CO2 SERPL-SCNC: 28 MMOL/L (ref 23–29)
COLOR UR: YELLOW
CREAT SERPL-MCNC: 1 MG/DL (ref 0.5–1.4)
CRP SERPL-MCNC: 0.8 MG/DL (ref 0–0.75)
DIFFERENTIAL METHOD: ABNORMAL
EOSINOPHIL # BLD AUTO: 0.3 K/UL (ref 0–0.5)
EOSINOPHIL NFR BLD: 3.9 % (ref 0–8)
ERYTHROCYTE [DISTWIDTH] IN BLOOD BY AUTOMATED COUNT: 13.8 % (ref 11.5–14.5)
ERYTHROCYTE [SEDIMENTATION RATE] IN BLOOD BY WESTERGREN METHOD: 32 MM/HR (ref 0–20)
EST. GFR  (AFRICAN AMERICAN): >60 ML/MIN/1.73 M^2
EST. GFR  (NON AFRICAN AMERICAN): >60 ML/MIN/1.73 M^2
ESTIMATED AVG GLUCOSE: 128 MG/DL (ref 68–131)
GLUCOSE SERPL-MCNC: 108 MG/DL (ref 70–110)
GLUCOSE UR QL STRIP: NEGATIVE
HBA1C MFR BLD HPLC: 6.1 % (ref 4.5–6.2)
HCT VFR BLD AUTO: 34.5 % (ref 37–48.5)
HDLC SERPL-MCNC: 35 MG/DL (ref 40–75)
HDLC SERPL: 18.8 % (ref 20–50)
HGB BLD-MCNC: 11.2 G/DL (ref 12–16)
HGB UR QL STRIP: ABNORMAL
IMM GRANULOCYTES # BLD AUTO: 0.03 K/UL (ref 0–0.04)
IMM GRANULOCYTES NFR BLD AUTO: 0.4 % (ref 0–0.5)
KETONES UR QL STRIP: NEGATIVE
LDLC SERPL CALC-MCNC: 134.4 MG/DL (ref 63–159)
LEUKOCYTE ESTERASE UR QL STRIP: ABNORMAL
LYMPHOCYTES # BLD AUTO: 3.4 K/UL (ref 1–4.8)
LYMPHOCYTES NFR BLD: 44 % (ref 18–48)
MCH RBC QN AUTO: 28.3 PG (ref 27–31)
MCHC RBC AUTO-ENTMCNC: 32.5 G/DL (ref 32–36)
MCV RBC AUTO: 87 FL (ref 82–98)
MICROSCOPIC COMMENT: ABNORMAL
MONOCYTES # BLD AUTO: 0.6 K/UL (ref 0.3–1)
MONOCYTES NFR BLD: 7.5 % (ref 4–15)
NEUTROPHILS # BLD AUTO: 3.4 K/UL (ref 1.8–7.7)
NEUTROPHILS NFR BLD: 43.8 % (ref 38–73)
NITRITE UR QL STRIP: POSITIVE
NONHDLC SERPL-MCNC: 151 MG/DL
NRBC BLD-RTO: 0 /100 WBC
PH UR STRIP: 6 [PH] (ref 5–8)
PLATELET # BLD AUTO: 266 K/UL (ref 150–350)
PMV BLD AUTO: 9.5 FL (ref 9.2–12.9)
POTASSIUM SERPL-SCNC: 4 MMOL/L (ref 3.5–5.1)
PROT SERPL-MCNC: 8 G/DL (ref 6–8.4)
PROT UR QL STRIP: NEGATIVE
RBC # BLD AUTO: 3.96 M/UL (ref 4–5.4)
RBC #/AREA URNS HPF: 5 /HPF (ref 0–4)
SODIUM SERPL-SCNC: 137 MMOL/L (ref 136–145)
SP GR UR STRIP: 1.02 (ref 1–1.03)
SQUAMOUS #/AREA URNS HPF: 3 /HPF
T4 FREE SERPL-MCNC: 0.95 NG/DL (ref 0.71–1.51)
TRIGL SERPL-MCNC: 83 MG/DL (ref 30–150)
TSH SERPL DL<=0.005 MIU/L-ACNC: 0.97 UIU/ML (ref 0.34–5.6)
URATE SERPL-MCNC: 6.8 MG/DL (ref 2.4–5.7)
URN SPEC COLLECT METH UR: ABNORMAL
UROBILINOGEN UR STRIP-ACNC: 1 EU/DL
WBC # BLD AUTO: 7.73 K/UL (ref 3.9–12.7)
WBC #/AREA URNS HPF: 35 /HPF (ref 0–5)
YEAST URNS QL MICRO: ABNORMAL

## 2020-09-04 PROCEDURE — 84443 ASSAY THYROID STIM HORMONE: CPT

## 2020-09-04 PROCEDURE — 3008F PR BODY MASS INDEX (BMI) DOCUMENTED: ICD-10-PCS | Mod: CPTII,S$GLB,, | Performed by: FAMILY MEDICINE

## 2020-09-04 PROCEDURE — 99214 OFFICE O/P EST MOD 30 MIN: CPT | Mod: S$GLB,,, | Performed by: FAMILY MEDICINE

## 2020-09-04 PROCEDURE — 80061 LIPID PANEL: CPT

## 2020-09-04 PROCEDURE — 3078F DIAST BP <80 MM HG: CPT | Mod: CPTII,S$GLB,, | Performed by: FAMILY MEDICINE

## 2020-09-04 PROCEDURE — 87186 SC STD MICRODIL/AGAR DIL: CPT

## 2020-09-04 PROCEDURE — 87086 URINE CULTURE/COLONY COUNT: CPT

## 2020-09-04 PROCEDURE — 84439 ASSAY OF FREE THYROXINE: CPT

## 2020-09-04 PROCEDURE — 85651 RBC SED RATE NONAUTOMATED: CPT

## 2020-09-04 PROCEDURE — 36415 COLL VENOUS BLD VENIPUNCTURE: CPT

## 2020-09-04 PROCEDURE — 3074F SYST BP LT 130 MM HG: CPT | Mod: CPTII,S$GLB,, | Performed by: FAMILY MEDICINE

## 2020-09-04 PROCEDURE — 83036 HEMOGLOBIN GLYCOSYLATED A1C: CPT

## 2020-09-04 PROCEDURE — 3008F BODY MASS INDEX DOCD: CPT | Mod: CPTII,S$GLB,, | Performed by: FAMILY MEDICINE

## 2020-09-04 PROCEDURE — 81000 URINALYSIS NONAUTO W/SCOPE: CPT

## 2020-09-04 PROCEDURE — 86431 RHEUMATOID FACTOR QUANT: CPT

## 2020-09-04 PROCEDURE — 85025 COMPLETE CBC W/AUTO DIFF WBC: CPT

## 2020-09-04 PROCEDURE — 84550 ASSAY OF BLOOD/URIC ACID: CPT

## 2020-09-04 PROCEDURE — 99999 PR PBB SHADOW E&M-EST. PATIENT-LVL IV: ICD-10-PCS | Mod: PBBFAC,,, | Performed by: FAMILY MEDICINE

## 2020-09-04 PROCEDURE — 87088 URINE BACTERIA CULTURE: CPT

## 2020-09-04 PROCEDURE — 80053 COMPREHEN METABOLIC PANEL: CPT

## 2020-09-04 PROCEDURE — 3078F PR MOST RECENT DIASTOLIC BLOOD PRESSURE < 80 MM HG: ICD-10-PCS | Mod: CPTII,S$GLB,, | Performed by: FAMILY MEDICINE

## 2020-09-04 PROCEDURE — 3074F PR MOST RECENT SYSTOLIC BLOOD PRESSURE < 130 MM HG: ICD-10-PCS | Mod: CPTII,S$GLB,, | Performed by: FAMILY MEDICINE

## 2020-09-04 PROCEDURE — 99999 PR PBB SHADOW E&M-EST. PATIENT-LVL IV: CPT | Mod: PBBFAC,,, | Performed by: FAMILY MEDICINE

## 2020-09-04 PROCEDURE — 99214 PR OFFICE/OUTPT VISIT, EST, LEVL IV, 30-39 MIN: ICD-10-PCS | Mod: S$GLB,,, | Performed by: FAMILY MEDICINE

## 2020-09-04 PROCEDURE — 87077 CULTURE AEROBIC IDENTIFY: CPT

## 2020-09-04 PROCEDURE — 86038 ANTINUCLEAR ANTIBODIES: CPT

## 2020-09-04 PROCEDURE — 86140 C-REACTIVE PROTEIN: CPT

## 2020-09-04 RX ORDER — LORATADINE 10 MG/1
10 TABLET ORAL DAILY
Qty: 90 TABLET | Refills: 3 | Status: SHIPPED | OUTPATIENT
Start: 2020-09-04 | End: 2021-10-09

## 2020-09-04 RX ORDER — TRAMADOL HYDROCHLORIDE 50 MG/1
50 TABLET ORAL EVERY 8 HOURS PRN
Qty: 90 TABLET | Refills: 3 | Status: SHIPPED | OUTPATIENT
Start: 2020-09-04 | End: 2021-02-24 | Stop reason: SDUPTHER

## 2020-09-04 RX ORDER — CIPROFLOXACIN 250 MG/1
250 TABLET, FILM COATED ORAL EVERY 12 HOURS
Qty: 6 TABLET | Refills: 0 | Status: SHIPPED | OUTPATIENT
Start: 2020-09-04 | End: 2020-09-07

## 2020-09-04 NOTE — PROGRESS NOTES
"Subjective:       Patient ID: Stephan Abraham is a 60 y.o. female.    Chief Complaint: Follow-up (would like BW ordered), Neck Pain, Urinary Incontinence, and Medication Refill    In regards to the patient's dyslipidemia, patient reports compliance with medication regimen without side effects.    Urinary frequency  Dysuria  Had uti one week ago  Finished abx, improved but not resolved.    Review of Systems   Constitutional: Negative for activity change, appetite change, chills, fatigue and fever.   HENT: Negative for congestion, dental problem, facial swelling, nosebleeds, postnasal drip, sinus pain, sore throat, trouble swallowing and voice change.    Eyes: Negative for pain, discharge and visual disturbance.   Respiratory: Negative for apnea, cough, chest tightness and shortness of breath.    Cardiovascular: Negative for chest pain and palpitations.   Gastrointestinal: Negative for abdominal pain, blood in stool, constipation and nausea.   Endocrine: Negative for cold intolerance, polydipsia and polyuria.   Genitourinary: Positive for dysuria. Negative for difficulty urinating, enuresis and flank pain.   Musculoskeletal: Negative for arthralgias and back pain.   Skin: Positive for rash. Negative for color change.   Allergic/Immunologic: Negative for environmental allergies and immunocompromised state.   Neurological: Negative for dizziness and light-headedness.   Hematological: Negative for adenopathy.   Psychiatric/Behavioral: Negative for agitation, behavioral problems, decreased concentration and dysphoric mood. The patient is not nervous/anxious.    All other systems reviewed and are negative.        Reviewed family, medical, surgical, and social history.    Objective:      /68 (BP Location: Left arm, Patient Position: Sitting, BP Method: Medium (Automatic))   Pulse 81   Temp 97.8 °F (36.6 °C) (Oral)   Resp 18   Ht 5' 8" (1.727 m)   Wt 106.8 kg (235 lb 6.4 oz)   LMP  (LMP Unknown)   SpO2 96%   BMI " 35.79 kg/m²   Physical Exam  Vitals signs and nursing note reviewed.   Constitutional:       General: She is not in acute distress.     Appearance: She is well-developed. She is not diaphoretic.   HENT:      Head: Normocephalic and atraumatic.      Nose: Nose normal.      Mouth/Throat:      Pharynx: No oropharyngeal exudate.   Eyes:      General: No scleral icterus.     Conjunctiva/sclera: Conjunctivae normal.      Pupils: Pupils are equal, round, and reactive to light.   Neck:      Musculoskeletal: Normal range of motion and neck supple.      Thyroid: No thyromegaly.   Cardiovascular:      Rate and Rhythm: Normal rate and regular rhythm.      Heart sounds: Normal heart sounds. No murmur. No friction rub. No gallop.    Pulmonary:      Effort: Pulmonary effort is normal. No respiratory distress.      Breath sounds: Normal breath sounds. No wheezing or rales.   Chest:      Chest wall: No tenderness.   Abdominal:      General: Bowel sounds are normal. There is no distension.      Palpations: Abdomen is soft.      Tenderness: There is no abdominal tenderness. There is no guarding.   Musculoskeletal: Normal range of motion.         General: No tenderness or deformity.   Lymphadenopathy:      Cervical: No cervical adenopathy.   Skin:     General: Skin is warm and dry.      Coloration: Skin is not pale.      Findings: No erythema or rash.   Neurological:      Mental Status: She is alert and oriented to person, place, and time.      Cranial Nerves: No cranial nerve deficit.      Sensory: No sensory deficit.      Motor: No abnormal muscle tone.      Deep Tendon Reflexes: Reflexes normal.   Psychiatric:         Behavior: Behavior normal.         Thought Content: Thought content normal.         Judgment: Judgment normal.         Assessment:       1. Acute cystitis without hematuria    2. Osteoarthritis of cervical spine with myelopathy    3. Arthralgia, unspecified joint    4. Hyperlipidemia, unspecified hyperlipidemia type     5. Hyperglycemia        Plan:       Acute cystitis without hematuria  -     Urine culture  -     Urinalysis  -     CBC auto differential; Future; Expected date: 09/04/2020  -     Hemoglobin A1C; Future; Expected date: 09/04/2020  -     Lipid Panel; Future; Expected date: 09/04/2020  -     TSH; Future; Expected date: 09/04/2020  -     T4, free; Future; Expected date: 09/04/2020  -     Sedimentation rate; Future; Expected date: 09/04/2020  -     C-Reactive Protein; Future; Expected date: 09/04/2020  -     PUNEET Screen w/Reflex; Future; Expected date: 09/04/2020  -     Rheumatoid factor; Future; Expected date: 09/04/2020  -     Uric acid; Future; Expected date: 09/04/2020  -     Comprehensive metabolic panel; Future; Expected date: 09/04/2020    Osteoarthritis of cervical spine with myelopathy  -     traMADoL (ULTRAM) 50 mg tablet; Take 1 tablet (50 mg total) by mouth every 8 (eight) hours as needed for Pain.  Dispense: 90 tablet; Refill: 3  -     CBC auto differential; Future; Expected date: 09/04/2020  -     Hemoglobin A1C; Future; Expected date: 09/04/2020  -     Lipid Panel; Future; Expected date: 09/04/2020  -     TSH; Future; Expected date: 09/04/2020  -     T4, free; Future; Expected date: 09/04/2020  -     Sedimentation rate; Future; Expected date: 09/04/2020  -     C-Reactive Protein; Future; Expected date: 09/04/2020  -     PUNEET Screen w/Reflex; Future; Expected date: 09/04/2020  -     Rheumatoid factor; Future; Expected date: 09/04/2020  -     Uric acid; Future; Expected date: 09/04/2020  -     Comprehensive metabolic panel; Future; Expected date: 09/04/2020    Arthralgia, unspecified joint  -     CBC auto differential; Future; Expected date: 09/04/2020  -     Hemoglobin A1C; Future; Expected date: 09/04/2020  -     Lipid Panel; Future; Expected date: 09/04/2020  -     TSH; Future; Expected date: 09/04/2020  -     T4, free; Future; Expected date: 09/04/2020  -     Sedimentation rate; Future; Expected date:  09/04/2020  -     C-Reactive Protein; Future; Expected date: 09/04/2020  -     PUNEET Screen w/Reflex; Future; Expected date: 09/04/2020  -     Rheumatoid factor; Future; Expected date: 09/04/2020  -     Uric acid; Future; Expected date: 09/04/2020  -     Comprehensive metabolic panel; Future; Expected date: 09/04/2020    Hyperlipidemia, unspecified hyperlipidemia type  -     CBC auto differential; Future; Expected date: 09/04/2020  -     Hemoglobin A1C; Future; Expected date: 09/04/2020  -     Lipid Panel; Future; Expected date: 09/04/2020  -     TSH; Future; Expected date: 09/04/2020  -     T4, free; Future; Expected date: 09/04/2020  -     Sedimentation rate; Future; Expected date: 09/04/2020  -     C-Reactive Protein; Future; Expected date: 09/04/2020  -     PUNEET Screen w/Reflex; Future; Expected date: 09/04/2020  -     Rheumatoid factor; Future; Expected date: 09/04/2020  -     Uric acid; Future; Expected date: 09/04/2020  -     Comprehensive metabolic panel; Future; Expected date: 09/04/2020    Hyperglycemia  -     CBC auto differential; Future; Expected date: 09/04/2020  -     Hemoglobin A1C; Future; Expected date: 09/04/2020  -     Lipid Panel; Future; Expected date: 09/04/2020  -     TSH; Future; Expected date: 09/04/2020  -     T4, free; Future; Expected date: 09/04/2020  -     Sedimentation rate; Future; Expected date: 09/04/2020  -     C-Reactive Protein; Future; Expected date: 09/04/2020  -     PUNEET Screen w/Reflex; Future; Expected date: 09/04/2020  -     Rheumatoid factor; Future; Expected date: 09/04/2020  -     Uric acid; Future; Expected date: 09/04/2020  -     Comprehensive metabolic panel; Future; Expected date: 09/04/2020    Other orders  -     loratadine (CLARITIN) 10 mg tablet; Take 1 tablet (10 mg total) by mouth once daily.  Dispense: 90 tablet; Refill: 3  -     ciprofloxacin HCl (CIPRO) 250 MG tablet; Take 1 tablet (250 mg total) by mouth every 12 (twelve) hours. for 3 days  Dispense: 6 tablet;  Refill: 0            Risks, benefits, and side effects were discussed with the patient. All questions were answered to the fullest satisfaction of the patient, and pt verbalized understanding and agreement to treatment plan. Pt was to call with any new or worsening symptoms, or present to the ER.

## 2020-09-05 LAB — RHEUMATOID FACT SERPL-ACNC: <10 IU/ML (ref 0–15)

## 2020-09-07 LAB — BACTERIA UR CULT: ABNORMAL

## 2020-09-09 LAB — ANA SER QL IF: NORMAL

## 2021-01-07 DIAGNOSIS — Z12.31 OTHER SCREENING MAMMOGRAM: ICD-10-CM

## 2021-01-19 ENCOUNTER — TELEPHONE (OUTPATIENT)
Dept: FAMILY MEDICINE | Facility: CLINIC | Age: 61
End: 2021-01-19

## 2021-01-28 ENCOUNTER — TELEPHONE (OUTPATIENT)
Dept: FAMILY MEDICINE | Facility: CLINIC | Age: 61
End: 2021-01-28

## 2021-01-29 ENCOUNTER — TELEPHONE (OUTPATIENT)
Dept: FAMILY MEDICINE | Facility: CLINIC | Age: 61
End: 2021-01-29

## 2021-02-24 ENCOUNTER — LAB VISIT (OUTPATIENT)
Dept: LAB | Facility: HOSPITAL | Age: 61
End: 2021-02-24
Attending: FAMILY MEDICINE
Payer: MEDICARE

## 2021-02-24 ENCOUNTER — OFFICE VISIT (OUTPATIENT)
Dept: FAMILY MEDICINE | Facility: CLINIC | Age: 61
End: 2021-02-24
Payer: MEDICARE

## 2021-02-24 VITALS
OXYGEN SATURATION: 98 % | RESPIRATION RATE: 18 BRPM | WEIGHT: 242.88 LBS | TEMPERATURE: 98 F | DIASTOLIC BLOOD PRESSURE: 66 MMHG | BODY MASS INDEX: 36.81 KG/M2 | SYSTOLIC BLOOD PRESSURE: 132 MMHG | HEIGHT: 68 IN | HEART RATE: 89 BPM

## 2021-02-24 DIAGNOSIS — E78.5 HYPERLIPIDEMIA, UNSPECIFIED HYPERLIPIDEMIA TYPE: ICD-10-CM

## 2021-02-24 DIAGNOSIS — N39.0 FREQUENT UTI: Primary | ICD-10-CM

## 2021-02-24 DIAGNOSIS — M47.12 OSTEOARTHRITIS OF CERVICAL SPINE WITH MYELOPATHY: ICD-10-CM

## 2021-02-24 LAB
ALBUMIN SERPL BCP-MCNC: 3.8 G/DL (ref 3.5–5.2)
ALP SERPL-CCNC: 90 U/L (ref 55–135)
ALT SERPL W/O P-5'-P-CCNC: 32 U/L (ref 10–44)
ANION GAP SERPL CALC-SCNC: 8 MMOL/L (ref 8–16)
AST SERPL-CCNC: 26 U/L (ref 10–40)
BASOPHILS # BLD AUTO: 0.04 K/UL (ref 0–0.2)
BASOPHILS NFR BLD: 0.5 % (ref 0–1.9)
BILIRUB SERPL-MCNC: 1.2 MG/DL (ref 0.1–1)
BUN SERPL-MCNC: 14 MG/DL (ref 6–20)
CALCIUM SERPL-MCNC: 9.5 MG/DL (ref 8.7–10.5)
CHLORIDE SERPL-SCNC: 103 MMOL/L (ref 95–110)
CHOLEST SERPL-MCNC: 192 MG/DL (ref 120–199)
CHOLEST/HDLC SERPL: 5.6 {RATIO} (ref 2–5)
CO2 SERPL-SCNC: 31 MMOL/L (ref 23–29)
CREAT SERPL-MCNC: 1.4 MG/DL (ref 0.5–1.4)
DIFFERENTIAL METHOD: ABNORMAL
EOSINOPHIL # BLD AUTO: 0.2 K/UL (ref 0–0.5)
EOSINOPHIL NFR BLD: 2 % (ref 0–8)
ERYTHROCYTE [DISTWIDTH] IN BLOOD BY AUTOMATED COUNT: 13.4 % (ref 11.5–14.5)
EST. GFR  (AFRICAN AMERICAN): 47.4 ML/MIN/1.73 M^2
EST. GFR  (NON AFRICAN AMERICAN): 41.2 ML/MIN/1.73 M^2
GLUCOSE SERPL-MCNC: 116 MG/DL (ref 70–110)
HCT VFR BLD AUTO: 35.9 % (ref 37–48.5)
HDLC SERPL-MCNC: 34 MG/DL (ref 40–75)
HDLC SERPL: 17.7 % (ref 20–50)
HGB BLD-MCNC: 11.6 G/DL (ref 12–16)
IMM GRANULOCYTES # BLD AUTO: 0.04 K/UL (ref 0–0.04)
IMM GRANULOCYTES NFR BLD AUTO: 0.5 % (ref 0–0.5)
LDLC SERPL CALC-MCNC: 126.2 MG/DL (ref 63–159)
LYMPHOCYTES # BLD AUTO: 3.2 K/UL (ref 1–4.8)
LYMPHOCYTES NFR BLD: 37.1 % (ref 18–48)
MCH RBC QN AUTO: 28.1 PG (ref 27–31)
MCHC RBC AUTO-ENTMCNC: 32.3 G/DL (ref 32–36)
MCV RBC AUTO: 87 FL (ref 82–98)
MONOCYTES # BLD AUTO: 0.6 K/UL (ref 0.3–1)
MONOCYTES NFR BLD: 7.4 % (ref 4–15)
NEUTROPHILS # BLD AUTO: 4.5 K/UL (ref 1.8–7.7)
NEUTROPHILS NFR BLD: 52.5 % (ref 38–73)
NONHDLC SERPL-MCNC: 158 MG/DL
NRBC BLD-RTO: 0 /100 WBC
PLATELET # BLD AUTO: 294 K/UL (ref 150–350)
PMV BLD AUTO: 9.2 FL (ref 9.2–12.9)
POTASSIUM SERPL-SCNC: 4.3 MMOL/L (ref 3.5–5.1)
PROT SERPL-MCNC: 7.9 G/DL (ref 6–8.4)
RBC # BLD AUTO: 4.13 M/UL (ref 4–5.4)
SODIUM SERPL-SCNC: 142 MMOL/L (ref 136–145)
T4 FREE SERPL-MCNC: 0.81 NG/DL (ref 0.71–1.51)
TRIGL SERPL-MCNC: 159 MG/DL (ref 30–150)
TSH SERPL DL<=0.005 MIU/L-ACNC: 0.95 UIU/ML (ref 0.34–5.6)
WBC # BLD AUTO: 8.63 K/UL (ref 3.9–12.7)

## 2021-02-24 PROCEDURE — 80061 LIPID PANEL: CPT

## 2021-02-24 PROCEDURE — 85025 COMPLETE CBC W/AUTO DIFF WBC: CPT

## 2021-02-24 PROCEDURE — 3008F PR BODY MASS INDEX (BMI) DOCUMENTED: ICD-10-PCS | Mod: CPTII,S$GLB,, | Performed by: FAMILY MEDICINE

## 2021-02-24 PROCEDURE — 99214 PR OFFICE/OUTPT VISIT, EST, LEVL IV, 30-39 MIN: ICD-10-PCS | Mod: S$GLB,,, | Performed by: FAMILY MEDICINE

## 2021-02-24 PROCEDURE — 84439 ASSAY OF FREE THYROXINE: CPT

## 2021-02-24 PROCEDURE — 3075F PR MOST RECENT SYSTOLIC BLOOD PRESS GE 130-139MM HG: ICD-10-PCS | Mod: CPTII,S$GLB,, | Performed by: FAMILY MEDICINE

## 2021-02-24 PROCEDURE — 99999 PR PBB SHADOW E&M-EST. PATIENT-LVL IV: CPT | Mod: PBBFAC,,, | Performed by: FAMILY MEDICINE

## 2021-02-24 PROCEDURE — 99499 RISK ADDL DX/OHS AUDIT: ICD-10-PCS | Mod: S$GLB,,, | Performed by: FAMILY MEDICINE

## 2021-02-24 PROCEDURE — 3078F PR MOST RECENT DIASTOLIC BLOOD PRESSURE < 80 MM HG: ICD-10-PCS | Mod: CPTII,S$GLB,, | Performed by: FAMILY MEDICINE

## 2021-02-24 PROCEDURE — 3078F DIAST BP <80 MM HG: CPT | Mod: CPTII,S$GLB,, | Performed by: FAMILY MEDICINE

## 2021-02-24 PROCEDURE — 99499 UNLISTED E&M SERVICE: CPT | Mod: S$GLB,,, | Performed by: FAMILY MEDICINE

## 2021-02-24 PROCEDURE — 84443 ASSAY THYROID STIM HORMONE: CPT

## 2021-02-24 PROCEDURE — 36415 COLL VENOUS BLD VENIPUNCTURE: CPT

## 2021-02-24 PROCEDURE — 99214 OFFICE O/P EST MOD 30 MIN: CPT | Mod: S$GLB,,, | Performed by: FAMILY MEDICINE

## 2021-02-24 PROCEDURE — 3008F BODY MASS INDEX DOCD: CPT | Mod: CPTII,S$GLB,, | Performed by: FAMILY MEDICINE

## 2021-02-24 PROCEDURE — 80053 COMPREHEN METABOLIC PANEL: CPT

## 2021-02-24 PROCEDURE — 99999 PR PBB SHADOW E&M-EST. PATIENT-LVL IV: ICD-10-PCS | Mod: PBBFAC,,, | Performed by: FAMILY MEDICINE

## 2021-02-24 PROCEDURE — 3075F SYST BP GE 130 - 139MM HG: CPT | Mod: CPTII,S$GLB,, | Performed by: FAMILY MEDICINE

## 2021-02-24 RX ORDER — TRAMADOL HYDROCHLORIDE 50 MG/1
50 TABLET ORAL EVERY 8 HOURS PRN
Qty: 90 TABLET | Refills: 3 | Status: SHIPPED | OUTPATIENT
Start: 2021-02-24 | End: 2022-06-02

## 2021-04-05 ENCOUNTER — PATIENT OUTREACH (OUTPATIENT)
Dept: ADMINISTRATIVE | Facility: OTHER | Age: 61
End: 2021-04-05

## 2021-04-06 ENCOUNTER — OFFICE VISIT (OUTPATIENT)
Dept: UROLOGY | Facility: CLINIC | Age: 61
End: 2021-04-06
Payer: MEDICARE

## 2021-04-06 VITALS
TEMPERATURE: 98 F | DIASTOLIC BLOOD PRESSURE: 82 MMHG | BODY MASS INDEX: 37.28 KG/M2 | HEART RATE: 83 BPM | OXYGEN SATURATION: 95 % | HEIGHT: 68 IN | WEIGHT: 246 LBS | SYSTOLIC BLOOD PRESSURE: 162 MMHG | RESPIRATION RATE: 15 BRPM

## 2021-04-06 DIAGNOSIS — N39.0 FREQUENT UTI: Primary | ICD-10-CM

## 2021-04-06 DIAGNOSIS — I10 ESSENTIAL HYPERTENSION: ICD-10-CM

## 2021-04-06 DIAGNOSIS — E78.49 OTHER HYPERLIPIDEMIA: ICD-10-CM

## 2021-04-06 LAB
BILIRUB SERPL-MCNC: NORMAL MG/DL
BLOOD URINE, POC: NORMAL
CLARITY, POC UA: CLEAR
COLOR, POC UA: NORMAL
GLUCOSE UR QL STRIP: NORMAL
KETONES UR QL STRIP: NORMAL
LEUKOCYTE ESTERASE URINE, POC: NORMAL
NITRITE, POC UA: NORMAL
PH, POC UA: 6
PROTEIN, POC: NORMAL
SPECIFIC GRAVITY, POC UA: 1.02
UROBILINOGEN, POC UA: 1

## 2021-04-06 PROCEDURE — 99204 PR OFFICE/OUTPT VISIT, NEW, LEVL IV, 45-59 MIN: ICD-10-PCS | Mod: 25,S$GLB,, | Performed by: STUDENT IN AN ORGANIZED HEALTH CARE EDUCATION/TRAINING PROGRAM

## 2021-04-06 PROCEDURE — 3077F SYST BP >= 140 MM HG: CPT | Mod: CPTII,S$GLB,, | Performed by: STUDENT IN AN ORGANIZED HEALTH CARE EDUCATION/TRAINING PROGRAM

## 2021-04-06 PROCEDURE — 3079F DIAST BP 80-89 MM HG: CPT | Mod: CPTII,S$GLB,, | Performed by: STUDENT IN AN ORGANIZED HEALTH CARE EDUCATION/TRAINING PROGRAM

## 2021-04-06 PROCEDURE — 81002 URINALYSIS NONAUTO W/O SCOPE: CPT | Mod: S$GLB,,, | Performed by: STUDENT IN AN ORGANIZED HEALTH CARE EDUCATION/TRAINING PROGRAM

## 2021-04-06 PROCEDURE — 1126F PR PAIN SEVERITY QUANTIFIED, NO PAIN PRESENT: ICD-10-PCS | Mod: S$GLB,,, | Performed by: STUDENT IN AN ORGANIZED HEALTH CARE EDUCATION/TRAINING PROGRAM

## 2021-04-06 PROCEDURE — 81002 POCT URINE DIPSTICK WITHOUT MICROSCOPE: ICD-10-PCS | Mod: S$GLB,,, | Performed by: STUDENT IN AN ORGANIZED HEALTH CARE EDUCATION/TRAINING PROGRAM

## 2021-04-06 PROCEDURE — 3077F PR MOST RECENT SYSTOLIC BLOOD PRESSURE >= 140 MM HG: ICD-10-PCS | Mod: CPTII,S$GLB,, | Performed by: STUDENT IN AN ORGANIZED HEALTH CARE EDUCATION/TRAINING PROGRAM

## 2021-04-06 PROCEDURE — 3008F BODY MASS INDEX DOCD: CPT | Mod: CPTII,S$GLB,, | Performed by: STUDENT IN AN ORGANIZED HEALTH CARE EDUCATION/TRAINING PROGRAM

## 2021-04-06 PROCEDURE — 3008F PR BODY MASS INDEX (BMI) DOCUMENTED: ICD-10-PCS | Mod: CPTII,S$GLB,, | Performed by: STUDENT IN AN ORGANIZED HEALTH CARE EDUCATION/TRAINING PROGRAM

## 2021-04-06 PROCEDURE — 1126F AMNT PAIN NOTED NONE PRSNT: CPT | Mod: S$GLB,,, | Performed by: STUDENT IN AN ORGANIZED HEALTH CARE EDUCATION/TRAINING PROGRAM

## 2021-04-06 PROCEDURE — 99999 PR PBB SHADOW E&M-EST. PATIENT-LVL V: CPT | Mod: PBBFAC,,, | Performed by: STUDENT IN AN ORGANIZED HEALTH CARE EDUCATION/TRAINING PROGRAM

## 2021-04-06 PROCEDURE — 99204 OFFICE O/P NEW MOD 45 MIN: CPT | Mod: 25,S$GLB,, | Performed by: STUDENT IN AN ORGANIZED HEALTH CARE EDUCATION/TRAINING PROGRAM

## 2021-04-06 PROCEDURE — 3079F PR MOST RECENT DIASTOLIC BLOOD PRESSURE 80-89 MM HG: ICD-10-PCS | Mod: CPTII,S$GLB,, | Performed by: STUDENT IN AN ORGANIZED HEALTH CARE EDUCATION/TRAINING PROGRAM

## 2021-04-06 PROCEDURE — 99999 PR PBB SHADOW E&M-EST. PATIENT-LVL V: ICD-10-PCS | Mod: PBBFAC,,, | Performed by: STUDENT IN AN ORGANIZED HEALTH CARE EDUCATION/TRAINING PROGRAM

## 2021-04-06 RX ORDER — ESTRADIOL 0.1 MG/G
1 CREAM VAGINAL
Qty: 8 G | Refills: 11 | Status: SHIPPED | OUTPATIENT
Start: 2021-04-08 | End: 2022-09-26

## 2021-04-07 ENCOUNTER — TELEPHONE (OUTPATIENT)
Dept: UROLOGY | Facility: CLINIC | Age: 61
End: 2021-04-07

## 2021-04-13 ENCOUNTER — HOSPITAL ENCOUNTER (OUTPATIENT)
Dept: RADIOLOGY | Facility: HOSPITAL | Age: 61
Discharge: HOME | End: 2021-04-13
Attending: STUDENT IN AN ORGANIZED HEALTH CARE EDUCATION/TRAINING PROGRAM
Payer: MEDICARE

## 2021-04-13 ENCOUNTER — TELEPHONE (OUTPATIENT)
Dept: UROLOGY | Facility: CLINIC | Age: 61
End: 2021-04-13

## 2021-04-13 DIAGNOSIS — N39.0 FREQUENT UTI: ICD-10-CM

## 2021-04-13 PROCEDURE — 76770 US RETROPERITONEAL COMPLETE: ICD-10-PCS | Mod: 26,,, | Performed by: RADIOLOGY

## 2021-04-13 PROCEDURE — 76770 US EXAM ABDO BACK WALL COMP: CPT | Mod: TC

## 2021-04-13 PROCEDURE — 76770 US EXAM ABDO BACK WALL COMP: CPT | Mod: 26,,, | Performed by: RADIOLOGY

## 2021-04-21 RX ORDER — GABAPENTIN 400 MG/1
400 CAPSULE ORAL 3 TIMES DAILY
Qty: 270 CAPSULE | Refills: 3 | Status: SHIPPED | OUTPATIENT
Start: 2021-04-21 | End: 2022-06-01 | Stop reason: SDUPTHER

## 2021-11-18 ENCOUNTER — HOSPITAL ENCOUNTER (OUTPATIENT)
Dept: RADIOLOGY | Facility: HOSPITAL | Age: 61
Discharge: HOME | End: 2021-11-18
Attending: FAMILY MEDICINE
Payer: MEDICARE

## 2021-11-18 VITALS — BODY MASS INDEX: 37.08 KG/M2 | HEIGHT: 68 IN | WEIGHT: 244.69 LBS

## 2021-11-18 DIAGNOSIS — Z12.31 OTHER SCREENING MAMMOGRAM: ICD-10-CM

## 2021-11-18 PROCEDURE — 77063 MAMMO DIGITAL SCREENING BILAT WITH TOMO: ICD-10-PCS | Mod: 26,,, | Performed by: RADIOLOGY

## 2021-11-18 PROCEDURE — 77067 SCR MAMMO BI INCL CAD: CPT | Mod: 26,,, | Performed by: RADIOLOGY

## 2021-11-18 PROCEDURE — 77067 MAMMO DIGITAL SCREENING BILAT WITH TOMO: ICD-10-PCS | Mod: 26,,, | Performed by: RADIOLOGY

## 2021-11-18 PROCEDURE — 77067 SCR MAMMO BI INCL CAD: CPT | Mod: TC

## 2021-11-18 PROCEDURE — 77063 BREAST TOMOSYNTHESIS BI: CPT | Mod: 26,,, | Performed by: RADIOLOGY

## 2021-11-24 ENCOUNTER — OFFICE VISIT (OUTPATIENT)
Dept: FAMILY MEDICINE | Facility: CLINIC | Age: 61
End: 2021-11-24
Payer: MEDICARE

## 2021-11-24 VITALS
DIASTOLIC BLOOD PRESSURE: 72 MMHG | RESPIRATION RATE: 16 BRPM | BODY MASS INDEX: 36.28 KG/M2 | HEART RATE: 82 BPM | HEIGHT: 68 IN | SYSTOLIC BLOOD PRESSURE: 129 MMHG | OXYGEN SATURATION: 97 % | WEIGHT: 239.38 LBS

## 2021-11-24 DIAGNOSIS — I48.0 PAROXYSMAL ATRIAL FIBRILLATION: ICD-10-CM

## 2021-11-24 DIAGNOSIS — J41.0 SIMPLE CHRONIC BRONCHITIS: ICD-10-CM

## 2021-11-24 DIAGNOSIS — Z23 NEED FOR VACCINATION: ICD-10-CM

## 2021-11-24 DIAGNOSIS — K11.20 PAROTITIS: Primary | ICD-10-CM

## 2021-11-24 PROCEDURE — G0008 FLU VACCINE (QUAD) GREATER THAN OR EQUAL TO 3YO PRESERVATIVE FREE IM: ICD-10-PCS | Mod: S$GLB,,, | Performed by: FAMILY MEDICINE

## 2021-11-24 PROCEDURE — 4010F ACE/ARB THERAPY RXD/TAKEN: CPT | Mod: CPTII,S$GLB,, | Performed by: FAMILY MEDICINE

## 2021-11-24 PROCEDURE — 99214 OFFICE O/P EST MOD 30 MIN: CPT | Mod: S$GLB,,, | Performed by: FAMILY MEDICINE

## 2021-11-24 PROCEDURE — 90686 FLU VACCINE (QUAD) GREATER THAN OR EQUAL TO 3YO PRESERVATIVE FREE IM: ICD-10-PCS | Mod: S$GLB,,, | Performed by: FAMILY MEDICINE

## 2021-11-24 PROCEDURE — 90686 IIV4 VACC NO PRSV 0.5 ML IM: CPT | Mod: S$GLB,,, | Performed by: FAMILY MEDICINE

## 2021-11-24 PROCEDURE — 99999 PR PBB SHADOW E&M-EST. PATIENT-LVL IV: CPT | Mod: PBBFAC,,, | Performed by: FAMILY MEDICINE

## 2021-11-24 PROCEDURE — 4010F PR ACE/ARB THEARPY RXD/TAKEN: ICD-10-PCS | Mod: CPTII,S$GLB,, | Performed by: FAMILY MEDICINE

## 2021-11-24 PROCEDURE — G0008 ADMIN INFLUENZA VIRUS VAC: HCPCS | Mod: S$GLB,,, | Performed by: FAMILY MEDICINE

## 2021-11-24 PROCEDURE — 99214 PR OFFICE/OUTPT VISIT, EST, LEVL IV, 30-39 MIN: ICD-10-PCS | Mod: S$GLB,,, | Performed by: FAMILY MEDICINE

## 2021-11-24 PROCEDURE — 99999 PR PBB SHADOW E&M-EST. PATIENT-LVL IV: ICD-10-PCS | Mod: PBBFAC,,, | Performed by: FAMILY MEDICINE

## 2021-11-24 RX ORDER — AMOXICILLIN AND CLAVULANATE POTASSIUM 875; 125 MG/1; MG/1
1 TABLET, FILM COATED ORAL EVERY 12 HOURS
Qty: 20 TABLET | Refills: 0 | Status: SHIPPED | OUTPATIENT
Start: 2021-11-24 | End: 2021-12-04

## 2022-03-02 DIAGNOSIS — I10 ESSENTIAL HYPERTENSION: ICD-10-CM

## 2022-03-09 ENCOUNTER — TELEPHONE (OUTPATIENT)
Dept: ADMINISTRATIVE | Facility: HOSPITAL | Age: 62
End: 2022-03-09
Payer: MEDICARE

## 2022-03-09 ENCOUNTER — PATIENT OUTREACH (OUTPATIENT)
Dept: ADMINISTRATIVE | Facility: HOSPITAL | Age: 62
End: 2022-03-09
Payer: MEDICARE

## 2022-03-09 DIAGNOSIS — I10 ESSENTIAL HYPERTENSION: ICD-10-CM

## 2022-03-09 DIAGNOSIS — E78.5 HYPERLIPIDEMIA, UNSPECIFIED HYPERLIPIDEMIA TYPE: Primary | ICD-10-CM

## 2022-03-09 NOTE — PROGRESS NOTES
Pre-visit Chart review notification  Spoke with pt about annual labs, she wanted to call back and schedule. I encouraged her to get labs completed prior to upcoming PCP visit in 2 weeks.

## 2022-03-17 ENCOUNTER — LAB VISIT (OUTPATIENT)
Dept: LAB | Facility: HOSPITAL | Age: 62
End: 2022-03-17
Attending: FAMILY MEDICINE
Payer: MEDICARE

## 2022-03-17 DIAGNOSIS — E78.5 HYPERLIPIDEMIA, UNSPECIFIED HYPERLIPIDEMIA TYPE: ICD-10-CM

## 2022-03-17 DIAGNOSIS — I10 ESSENTIAL HYPERTENSION: ICD-10-CM

## 2022-03-17 LAB
ALBUMIN SERPL BCP-MCNC: 3.8 G/DL (ref 3.5–5.2)
ALP SERPL-CCNC: 105 U/L (ref 55–135)
ALT SERPL W/O P-5'-P-CCNC: 33 U/L (ref 10–44)
ANION GAP SERPL CALC-SCNC: 10 MMOL/L (ref 8–16)
AST SERPL-CCNC: 24 U/L (ref 10–40)
BASOPHILS # BLD AUTO: 0.02 K/UL (ref 0–0.2)
BASOPHILS NFR BLD: 0.3 % (ref 0–1.9)
BILIRUB SERPL-MCNC: 1.4 MG/DL (ref 0.1–1)
BUN SERPL-MCNC: 12 MG/DL (ref 6–20)
CALCIUM SERPL-MCNC: 9.5 MG/DL (ref 8.7–10.5)
CHLORIDE SERPL-SCNC: 104 MMOL/L (ref 95–110)
CHOLEST SERPL-MCNC: 203 MG/DL (ref 120–199)
CHOLEST/HDLC SERPL: 5.5 {RATIO} (ref 2–5)
CO2 SERPL-SCNC: 28 MMOL/L (ref 23–29)
CREAT SERPL-MCNC: 1.1 MG/DL (ref 0.5–1.4)
DIFFERENTIAL METHOD: ABNORMAL
EOSINOPHIL # BLD AUTO: 0.2 K/UL (ref 0–0.5)
EOSINOPHIL NFR BLD: 2.6 % (ref 0–8)
ERYTHROCYTE [DISTWIDTH] IN BLOOD BY AUTOMATED COUNT: 13.2 % (ref 11.5–14.5)
EST. GFR  (AFRICAN AMERICAN): >60 ML/MIN/1.73 M^2
EST. GFR  (NON AFRICAN AMERICAN): 54.7 ML/MIN/1.73 M^2
ESTIMATED AVG GLUCOSE: 126 MG/DL (ref 68–131)
GLUCOSE SERPL-MCNC: 111 MG/DL (ref 70–110)
HBA1C MFR BLD: 6 % (ref 4–5.6)
HCT VFR BLD AUTO: 36.2 % (ref 37–48.5)
HDLC SERPL-MCNC: 37 MG/DL (ref 40–75)
HDLC SERPL: 18.2 % (ref 20–50)
HGB BLD-MCNC: 12 G/DL (ref 12–16)
IMM GRANULOCYTES # BLD AUTO: 0.02 K/UL (ref 0–0.04)
IMM GRANULOCYTES NFR BLD AUTO: 0.3 % (ref 0–0.5)
LDLC SERPL CALC-MCNC: 140.6 MG/DL (ref 63–159)
LYMPHOCYTES # BLD AUTO: 2.9 K/UL (ref 1–4.8)
LYMPHOCYTES NFR BLD: 42.2 % (ref 18–48)
MCH RBC QN AUTO: 28.8 PG (ref 27–31)
MCHC RBC AUTO-ENTMCNC: 33.1 G/DL (ref 32–36)
MCV RBC AUTO: 87 FL (ref 82–98)
MONOCYTES # BLD AUTO: 0.5 K/UL (ref 0.3–1)
MONOCYTES NFR BLD: 7.3 % (ref 4–15)
NEUTROPHILS # BLD AUTO: 3.3 K/UL (ref 1.8–7.7)
NEUTROPHILS NFR BLD: 47.3 % (ref 38–73)
NONHDLC SERPL-MCNC: 166 MG/DL
NRBC BLD-RTO: 0 /100 WBC
PLATELET # BLD AUTO: 268 K/UL (ref 150–450)
PMV BLD AUTO: 9.2 FL (ref 9.2–12.9)
POTASSIUM SERPL-SCNC: 4 MMOL/L (ref 3.5–5.1)
PROT SERPL-MCNC: 7.8 G/DL (ref 6–8.4)
RBC # BLD AUTO: 4.16 M/UL (ref 4–5.4)
SODIUM SERPL-SCNC: 142 MMOL/L (ref 136–145)
T4 FREE SERPL-MCNC: 0.85 NG/DL (ref 0.71–1.51)
TRIGL SERPL-MCNC: 127 MG/DL (ref 30–150)
TSH SERPL DL<=0.005 MIU/L-ACNC: 1.34 UIU/ML (ref 0.4–4)
WBC # BLD AUTO: 6.95 K/UL (ref 3.9–12.7)

## 2022-03-17 PROCEDURE — 84443 ASSAY THYROID STIM HORMONE: CPT | Performed by: FAMILY MEDICINE

## 2022-03-17 PROCEDURE — 85025 COMPLETE CBC W/AUTO DIFF WBC: CPT | Performed by: FAMILY MEDICINE

## 2022-03-17 PROCEDURE — 83036 HEMOGLOBIN GLYCOSYLATED A1C: CPT | Performed by: FAMILY MEDICINE

## 2022-03-17 PROCEDURE — 80061 LIPID PANEL: CPT | Performed by: FAMILY MEDICINE

## 2022-03-17 PROCEDURE — 36415 COLL VENOUS BLD VENIPUNCTURE: CPT | Performed by: FAMILY MEDICINE

## 2022-03-17 PROCEDURE — 84439 ASSAY OF FREE THYROXINE: CPT | Performed by: FAMILY MEDICINE

## 2022-03-17 PROCEDURE — 80053 COMPREHEN METABOLIC PANEL: CPT | Performed by: FAMILY MEDICINE

## 2022-03-23 ENCOUNTER — OFFICE VISIT (OUTPATIENT)
Dept: FAMILY MEDICINE | Facility: CLINIC | Age: 62
End: 2022-03-23
Payer: MEDICARE

## 2022-03-23 VITALS
HEART RATE: 82 BPM | BODY MASS INDEX: 35.86 KG/M2 | SYSTOLIC BLOOD PRESSURE: 128 MMHG | OXYGEN SATURATION: 97 % | RESPIRATION RATE: 20 BRPM | DIASTOLIC BLOOD PRESSURE: 76 MMHG | HEIGHT: 68 IN | WEIGHT: 236.63 LBS

## 2022-03-23 DIAGNOSIS — E78.5 HYPERLIPIDEMIA, UNSPECIFIED HYPERLIPIDEMIA TYPE: ICD-10-CM

## 2022-03-23 DIAGNOSIS — M47.12 OSTEOARTHRITIS OF CERVICAL SPINE WITH MYELOPATHY: ICD-10-CM

## 2022-03-23 DIAGNOSIS — E66.01 MORBID (SEVERE) OBESITY DUE TO EXCESS CALORIES: ICD-10-CM

## 2022-03-23 DIAGNOSIS — J41.0 SIMPLE CHRONIC BRONCHITIS: ICD-10-CM

## 2022-03-23 DIAGNOSIS — I48.0 PAROXYSMAL ATRIAL FIBRILLATION: Primary | ICD-10-CM

## 2022-03-23 PROBLEM — N18.31 CHRONIC KIDNEY DISEASE, STAGE 3A: Status: ACTIVE | Noted: 2022-03-23

## 2022-03-23 PROBLEM — N18.31 CHRONIC KIDNEY DISEASE, STAGE 3A: Status: RESOLVED | Noted: 2022-03-23 | Resolved: 2022-03-23

## 2022-03-23 PROCEDURE — 3074F SYST BP LT 130 MM HG: CPT | Mod: CPTII,S$GLB,, | Performed by: FAMILY MEDICINE

## 2022-03-23 PROCEDURE — 3044F PR MOST RECENT HEMOGLOBIN A1C LEVEL <7.0%: ICD-10-PCS | Mod: CPTII,S$GLB,, | Performed by: FAMILY MEDICINE

## 2022-03-23 PROCEDURE — 1160F RVW MEDS BY RX/DR IN RCRD: CPT | Mod: CPTII,S$GLB,, | Performed by: FAMILY MEDICINE

## 2022-03-23 PROCEDURE — 3044F HG A1C LEVEL LT 7.0%: CPT | Mod: CPTII,S$GLB,, | Performed by: FAMILY MEDICINE

## 2022-03-23 PROCEDURE — 3078F PR MOST RECENT DIASTOLIC BLOOD PRESSURE < 80 MM HG: ICD-10-PCS | Mod: CPTII,S$GLB,, | Performed by: FAMILY MEDICINE

## 2022-03-23 PROCEDURE — 1159F MED LIST DOCD IN RCRD: CPT | Mod: CPTII,S$GLB,, | Performed by: FAMILY MEDICINE

## 2022-03-23 PROCEDURE — 3074F PR MOST RECENT SYSTOLIC BLOOD PRESSURE < 130 MM HG: ICD-10-PCS | Mod: CPTII,S$GLB,, | Performed by: FAMILY MEDICINE

## 2022-03-23 PROCEDURE — 3078F DIAST BP <80 MM HG: CPT | Mod: CPTII,S$GLB,, | Performed by: FAMILY MEDICINE

## 2022-03-23 PROCEDURE — 99999 PR PBB SHADOW E&M-EST. PATIENT-LVL III: CPT | Mod: PBBFAC,,, | Performed by: FAMILY MEDICINE

## 2022-03-23 PROCEDURE — 3008F BODY MASS INDEX DOCD: CPT | Mod: CPTII,S$GLB,, | Performed by: FAMILY MEDICINE

## 2022-03-23 PROCEDURE — 1159F PR MEDICATION LIST DOCUMENTED IN MEDICAL RECORD: ICD-10-PCS | Mod: CPTII,S$GLB,, | Performed by: FAMILY MEDICINE

## 2022-03-23 PROCEDURE — 99214 PR OFFICE/OUTPT VISIT, EST, LEVL IV, 30-39 MIN: ICD-10-PCS | Mod: S$GLB,,, | Performed by: FAMILY MEDICINE

## 2022-03-23 PROCEDURE — 3008F PR BODY MASS INDEX (BMI) DOCUMENTED: ICD-10-PCS | Mod: CPTII,S$GLB,, | Performed by: FAMILY MEDICINE

## 2022-03-23 PROCEDURE — 1160F PR REVIEW ALL MEDS BY PRESCRIBER/CLIN PHARMACIST DOCUMENTED: ICD-10-PCS | Mod: CPTII,S$GLB,, | Performed by: FAMILY MEDICINE

## 2022-03-23 PROCEDURE — 99214 OFFICE O/P EST MOD 30 MIN: CPT | Mod: S$GLB,,, | Performed by: FAMILY MEDICINE

## 2022-03-23 PROCEDURE — 99999 PR PBB SHADOW E&M-EST. PATIENT-LVL III: ICD-10-PCS | Mod: PBBFAC,,, | Performed by: FAMILY MEDICINE

## 2022-03-23 NOTE — PROGRESS NOTES
" Patient ID: Stephan Abraham is a 61 y.o. female.    Chief Complaint: Follow-up (BW review)      Reviewed family, medical, surgical, and social history.    No cp/sob  No change in mental status  No fever  No asymmetrical limb swelling    Objective:      /76 (BP Location: Left arm, Patient Position: Sitting, BP Method: Large (Automatic))   Pulse 82   Resp 20   Ht 5' 8" (1.727 m)   Wt 107.3 kg (236 lb 9.6 oz)   LMP  (LMP Unknown)   SpO2 97%   BMI 35.97 kg/m²   RRR, CTAB, s/nt/nd, no c/c/e, non-toxic appearing, no focal weakness  Assessment:       1. Paroxysmal atrial fibrillation    2. Osteoarthritis of cervical spine with myelopathy    3. Hyperlipidemia, unspecified hyperlipidemia type    4. Morbid (severe) obesity due to excess calories    5. Simple chronic bronchitis        Plan:       Paroxysmal atrial fibrillation    Osteoarthritis of cervical spine with myelopathy    Hyperlipidemia, unspecified hyperlipidemia type    Morbid (severe) obesity due to excess calories    Simple chronic bronchitis            Reviewed, monitored, evaluated, and assessed chronic conditions as outlined above  Continue current medicines, any changes noted above  As shown  Labs, radiology studies, and procedures/notes from the last 3 months were reviewed.    Risks, benefits, and side effects were discussed with the patient. All questions were answered to the fullest satisfaction of the patient, and pt verbalized understanding and agreement to treatment plan. Pt was to call with any new or worsening symptoms, or present to the ER.    "

## 2022-06-01 DIAGNOSIS — I10 ESSENTIAL HYPERTENSION: ICD-10-CM

## 2022-06-01 DIAGNOSIS — E78.5 HYPERLIPIDEMIA, UNSPECIFIED HYPERLIPIDEMIA TYPE: ICD-10-CM

## 2022-06-01 RX ORDER — MELOXICAM 15 MG/1
15 TABLET ORAL DAILY
Qty: 90 TABLET | Refills: 3 | Status: SHIPPED | OUTPATIENT
Start: 2022-06-01 | End: 2023-04-22

## 2022-06-01 RX ORDER — CYCLOBENZAPRINE HCL 10 MG
TABLET ORAL
Qty: 270 TABLET | Refills: 3 | Status: SHIPPED | OUTPATIENT
Start: 2022-06-01 | End: 2023-04-22

## 2022-06-01 RX ORDER — PRAVASTATIN SODIUM 20 MG/1
20 TABLET ORAL DAILY
Qty: 90 TABLET | Refills: 3 | Status: SHIPPED | OUTPATIENT
Start: 2022-06-01 | End: 2023-04-22

## 2022-06-01 RX ORDER — BENAZEPRIL HYDROCHLORIDE AND HYDROCHLOROTHIAZIDE 20; 12.5 MG/1; MG/1
1 TABLET ORAL DAILY
Qty: 90 TABLET | Refills: 3 | Status: SHIPPED | OUTPATIENT
Start: 2022-06-01 | End: 2023-04-22

## 2022-06-01 RX ORDER — GABAPENTIN 400 MG/1
400 CAPSULE ORAL 3 TIMES DAILY
Qty: 270 CAPSULE | Refills: 3 | Status: SHIPPED | OUTPATIENT
Start: 2022-06-01 | End: 2023-07-21

## 2022-06-01 NOTE — TELEPHONE ENCOUNTER
----- Message from [facility] sent at 6/1/2022  8:15 AM CDT -----  .Type:  RX Refill Request    Who Called: PT     Refill or New Rx: REFILLS 90 DAY SUPPLY     RX Name and Strength:     gabapentin (NEURONTIN) 400 MG capsule    pravastatin (PRAVACHOL) 20 MG tablet    traMADoL (ULTRAM) 50 mg tablet     meloxicam (MOBIC) 15 MG tablet    cyclobenzaprine (FLEXERIL) 10 MG tablet    benazepril-hydrochlorthiazide (LOTENSIN HCT) 20-12.5 mg per tablet      Preferred Pharmacy with phone number [facility] [phone]     Pt Call Back Number: [phone]    Additional Information: Thank You

## 2022-06-01 NOTE — TELEPHONE ENCOUNTER
Pt requesting refills on     Mobic 15 mg    Neurontin 400 mg     Flexeril 10 mg     Lotensin HCT       Pravastatin 20 mg   Last refill 4.5.21    Requesting 90 day supply

## 2022-06-02 DIAGNOSIS — M47.12 OSTEOARTHRITIS OF CERVICAL SPINE WITH MYELOPATHY: ICD-10-CM

## 2022-06-02 RX ORDER — TRAMADOL HYDROCHLORIDE 50 MG/1
TABLET ORAL
Qty: 90 TABLET | Refills: 0 | Status: SHIPPED | OUTPATIENT
Start: 2022-06-02 | End: 2022-12-16

## 2022-06-03 ENCOUNTER — HOSPITAL ENCOUNTER (OUTPATIENT)
Dept: RADIOLOGY | Facility: HOSPITAL | Age: 62
Discharge: HOME | End: 2022-06-03
Attending: FAMILY MEDICINE
Payer: MEDICARE

## 2022-06-03 DIAGNOSIS — M79.605 PAIN OF LEFT LOWER EXTREMITY: ICD-10-CM

## 2022-06-06 ENCOUNTER — TELEPHONE (OUTPATIENT)
Dept: FAMILY MEDICINE | Facility: CLINIC | Age: 62
End: 2022-06-06
Payer: MEDICARE

## 2022-06-06 ENCOUNTER — PATIENT MESSAGE (OUTPATIENT)
Dept: FAMILY MEDICINE | Facility: CLINIC | Age: 62
End: 2022-06-06
Payer: MEDICARE

## 2022-06-06 DIAGNOSIS — M25.559 HIP PAIN: Primary | ICD-10-CM

## 2022-06-06 NOTE — TELEPHONE ENCOUNTER
----- Message from Deya Chang MA sent at 6/6/2022 12:44 PM CDT -----  Spoke with pt notified her of xray results, pt agrees to see Orthopedic and does not have a preference. Pt states seems like pain is getting worse.

## 2022-07-21 DIAGNOSIS — M25.552 LEFT HIP PAIN: Primary | ICD-10-CM

## 2022-07-27 ENCOUNTER — HOSPITAL ENCOUNTER (OUTPATIENT)
Dept: RADIOLOGY | Facility: HOSPITAL | Age: 62
Discharge: HOME | End: 2022-07-27
Attending: ORTHOPAEDIC SURGERY
Payer: MEDICARE

## 2022-07-27 ENCOUNTER — OFFICE VISIT (OUTPATIENT)
Dept: ORTHOPEDICS | Facility: CLINIC | Age: 62
End: 2022-07-27
Payer: MEDICARE

## 2022-07-27 VITALS — BODY MASS INDEX: 35.85 KG/M2 | HEIGHT: 68 IN | RESPIRATION RATE: 16 BRPM | WEIGHT: 236.56 LBS

## 2022-07-27 DIAGNOSIS — M70.62 GREATER TROCHANTERIC BURSITIS OF LEFT HIP: Primary | ICD-10-CM

## 2022-07-27 DIAGNOSIS — M16.10 ARTHRITIS OF HIP: ICD-10-CM

## 2022-07-27 DIAGNOSIS — M54.10 RADICULAR PAIN OF LEFT LOWER EXTREMITY: ICD-10-CM

## 2022-07-27 DIAGNOSIS — M25.559 HIP PAIN: ICD-10-CM

## 2022-07-27 DIAGNOSIS — M25.552 LEFT HIP PAIN: ICD-10-CM

## 2022-07-27 PROCEDURE — 73502 X-RAY EXAM HIP UNI 2-3 VIEWS: CPT | Mod: 26,LT,, | Performed by: RADIOLOGY

## 2022-07-27 PROCEDURE — 99999 PR PBB SHADOW E&M-EST. PATIENT-LVL III: ICD-10-PCS | Mod: PBBFAC,,, | Performed by: ORTHOPAEDIC SURGERY

## 2022-07-27 PROCEDURE — 73502 XR PELVIS 3 VIEW INC HIP 2 VIEW LEFT: ICD-10-PCS | Mod: 26,LT,, | Performed by: RADIOLOGY

## 2022-07-27 PROCEDURE — 99999 PR PBB SHADOW E&M-EST. PATIENT-LVL III: CPT | Mod: PBBFAC,,, | Performed by: ORTHOPAEDIC SURGERY

## 2022-07-27 PROCEDURE — 20610 DRAIN/INJ JOINT/BURSA W/O US: CPT | Mod: LT,S$GLB,, | Performed by: ORTHOPAEDIC SURGERY

## 2022-07-27 PROCEDURE — 3044F HG A1C LEVEL LT 7.0%: CPT | Mod: CPTII,S$GLB,, | Performed by: ORTHOPAEDIC SURGERY

## 2022-07-27 PROCEDURE — 4010F ACE/ARB THERAPY RXD/TAKEN: CPT | Mod: CPTII,S$GLB,, | Performed by: ORTHOPAEDIC SURGERY

## 2022-07-27 PROCEDURE — 1159F PR MEDICATION LIST DOCUMENTED IN MEDICAL RECORD: ICD-10-PCS | Mod: CPTII,S$GLB,, | Performed by: ORTHOPAEDIC SURGERY

## 2022-07-27 PROCEDURE — 1159F MED LIST DOCD IN RCRD: CPT | Mod: CPTII,S$GLB,, | Performed by: ORTHOPAEDIC SURGERY

## 2022-07-27 PROCEDURE — 20610 LARGE JOINT ASPIRATION/INJECTION: L GREATER TROCHANTERIC BURSA: ICD-10-PCS | Mod: LT,S$GLB,, | Performed by: ORTHOPAEDIC SURGERY

## 2022-07-27 PROCEDURE — 4010F PR ACE/ARB THEARPY RXD/TAKEN: ICD-10-PCS | Mod: CPTII,S$GLB,, | Performed by: ORTHOPAEDIC SURGERY

## 2022-07-27 PROCEDURE — 99204 PR OFFICE/OUTPT VISIT, NEW, LEVL IV, 45-59 MIN: ICD-10-PCS | Mod: 25,S$GLB,, | Performed by: ORTHOPAEDIC SURGERY

## 2022-07-27 PROCEDURE — 99204 OFFICE O/P NEW MOD 45 MIN: CPT | Mod: 25,S$GLB,, | Performed by: ORTHOPAEDIC SURGERY

## 2022-07-27 PROCEDURE — 73502 X-RAY EXAM HIP UNI 2-3 VIEWS: CPT | Mod: TC,LT

## 2022-07-27 PROCEDURE — 3008F BODY MASS INDEX DOCD: CPT | Mod: CPTII,S$GLB,, | Performed by: ORTHOPAEDIC SURGERY

## 2022-07-27 PROCEDURE — 3008F PR BODY MASS INDEX (BMI) DOCUMENTED: ICD-10-PCS | Mod: CPTII,S$GLB,, | Performed by: ORTHOPAEDIC SURGERY

## 2022-07-27 PROCEDURE — 3044F PR MOST RECENT HEMOGLOBIN A1C LEVEL <7.0%: ICD-10-PCS | Mod: CPTII,S$GLB,, | Performed by: ORTHOPAEDIC SURGERY

## 2022-07-27 RX ORDER — TRIAMCINOLONE ACETONIDE 40 MG/ML
40 INJECTION, SUSPENSION INTRA-ARTICULAR; INTRAMUSCULAR
Status: DISCONTINUED | OUTPATIENT
Start: 2022-07-27 | End: 2022-07-27 | Stop reason: HOSPADM

## 2022-07-27 RX ADMIN — TRIAMCINOLONE ACETONIDE 40 MG: 40 INJECTION, SUSPENSION INTRA-ARTICULAR; INTRAMUSCULAR at 09:07

## 2022-07-27 NOTE — PROGRESS NOTES
Subjective:      Patient ID: Stephan Abraham is a 62 y.o. female.    Chief Complaint: Pain of the Left Hip    Referring Provider: Jim Lozano Md  [address]  [place] [id number]    HPI:  Ms. Abraham is a 62-year-old lady who presented today for evaluation of approximately 2 months of left hip pain which radiates from her foot up to her hip and into her back.  She stated her pain began when she awoke 1 morning.  When she ambulates approximately 50 yd the pain develops and then resolves after she sits and rest.  She stated that she has to stop and rest when she walks in [place] and has to lean on a shopping cart in order to walk around the store.  Walking distances increases her symptoms while heat and massage decreases them.  She has taken NSAIDs and is unsure if they help.  She has not worn a brace, done physical therapy, nor had injections.    Past Medical History:   Diagnosis Date    Allergy     Family hx of colon cancer 2013    Hyperlipidemia      *  *  * Hypertension  Diverticulitis  Fibromyalgia  Chronic low back      Past Surgical History:   Procedure Laterality Date    LUMBAR FUSION WITH INSTRUMENTATION      HYSTERECTOMY      C-SPINE FUSION WITH INSTRUMENTATION      OOPHORECTOMY      TOTAL KNEE ARTHROPLASTY RIGHT       *  * TUBAL LIGATION  COLONOSCOPY  CARDIAC CATHETERIZATION         Review of patient's allergies indicates:   Allergen Reactions    Adhesive     Latex Hives       Social History     Occupational History    Disabled    Tobacco Use    Smoking status: Never Smoker    Smokeless tobacco: Never Used   Substance and Sexual Activity    Alcohol use: No    Drug use: No    Sexual activity: Yes     Partners: Male      Family History   Problem Relation Age of Onset    Lung cancer Mother     Prostate cancer Father     Atrial fibrillation Father     Pacemaker/defibrilator Sister     Prostate cancer Brother     Breast cancer Neg Hx      Ovarian cancer Neg Hx        Previous Hospitalizations:  Childbirth, right total knee arthroplasty, lumbar fusion, C-spine, cardiac issues.      ROS:   Review of Systems   Constitutional: Negative for chills and fever.   HENT: Negative for congestion.    Eyes: Negative for blurred vision and double vision.   Cardiovascular: Negative for chest pain and cyanosis.   Respiratory: Negative for cough and shortness of breath.    Endocrine: Negative for polydipsia.   Hematologic/Lymphatic: Negative for adenopathy.   Skin: Negative for flushing, itching and rash.   Musculoskeletal: Positive for back pain and joint pain. Negative for gout.   Gastrointestinal: Negative for constipation, diarrhea and heartburn.   Genitourinary: Negative for nocturia.   Neurological: Negative for headaches and seizures.   Psychiatric/Behavioral: Negative for depression and substance abuse. The patient is not nervous/anxious.    Allergic/Immunologic: Positive for environmental allergies.           Objective:      Physical Exam:   General: AAOx3.  No acute distress  HEENT: Normocephalic, PEARLA EOMI.  Fair Dentition  Neck: Supple, No JVD  Chest: Symetric, equal excursion on inspiration  Abdomen: Soft NTND  Vascular:  Pulses intact and equal bilaterally.  Capillary refill less than 3 seconds and equal bilaterally  Neurologic:  Pinprick and soft touch intact and equal bilaterally.  Mildly positive SLR left side.  Integment:  No ecchymosis, no errythema  Extremity:  Hip:  Flexion/extension equal bilaterally greater than 95°/10°.  Internal/external rotation equal bilaterally.  Surjit/fabere/logroll/push-pull negative both hips.  Nontender with hip motion.  Tender over the greater trochanter left hip. Erasto's positive left hip.  Nontender with groin palpation bilaterally.                     Lumbosacral spine:  Forward flexion/backward flexion 30/10 degrees.  Right/left rotation 25/25 degrees.  Right/left side bending 20°/20°.  Mild tenderness with  palpation lumbosacral spine.  Able to heel and toe walk.  Mildly positive SLR left side.  Radiography:  Personally reviewed x-rays which include AP pelvis and left hip completed on 07/27/2022 showed mild to moderate femoral acetabular arthritic changes of both hips pubic symphysis arthritic changes and instrumentation within the lumbosacral spine consistent with fusion.      Assessment:       Impression:      1. Greater trochanteric bursitis of left hip    2. Arthritis of hip    3. Radicular pain of left lower extremity    4. Hip pain          Plan:       1.  Discussed physical examination and radiographic findings with the patient. Stephan understands that she has greater trochanteric bursitis of her left hip along with radicular problems of her left lower extremity.  Treatment alternatives and outcomes were discussed with the patient she understands she could be treated conservatively observation, activity modification, NSAIDs, bracing, physical therapy, injections, or she could consider surgical intervention such as bursectomy.  Discussed with the patient that she also has a component of radicular problems of her left lower extremity which she needs to discuss with her spine surgeon is this office does not treat spine issues.  2. Offered a steroid injection to the greater trochanteric bursa of the left hip, she elected to proceed.  3. Recommend the patient contact her spine surgeon and have them do a thorough evaluation for radicular problems of her left lower extremity.  4. Continue with NSAIDs as tolerated allowed by PCM.  5. Home exercises such as hip stretching exercises were discussed.  6. Follow up p.r.n..

## 2022-07-27 NOTE — PROCEDURES
Large Joint Aspiration/Injection: L greater trochanteric bursa    Date/Time: 7/27/2022 9:30 AM  Performed by: Cal Arnett DO  Authorized by: Cal Arnett, DO     Consent Done?:  Yes (Verbal)  Indications:  Diagnostic evaluation and pain  Site marked: the procedure site was marked    Timeout: prior to procedure the correct patient, procedure, and site was verified    Prep: patient was prepped and draped in usual sterile fashion      Details:  Needle Size:  22 G  Ultrasonic Guidance for needle placement?: No    Approach:  Lateral  Location:  Hip  Site:  L greater trochanteric bursa  Medications:  40 mg triamcinolone acetonide 40 mg/mL  Patient tolerance:  Patient tolerated the procedure well with no immediate complications

## 2022-08-10 ENCOUNTER — HOSPITAL ENCOUNTER (EMERGENCY)
Facility: HOSPITAL | Age: 62
Discharge: HOME | End: 2022-08-10
Attending: EMERGENCY MEDICINE
Payer: MEDICARE

## 2022-08-10 VITALS
WEIGHT: 242 LBS | DIASTOLIC BLOOD PRESSURE: 73 MMHG | RESPIRATION RATE: 27 BRPM | HEART RATE: 79 BPM | BODY MASS INDEX: 36.68 KG/M2 | TEMPERATURE: 98 F | SYSTOLIC BLOOD PRESSURE: 134 MMHG | HEIGHT: 68 IN | OXYGEN SATURATION: 95 %

## 2022-08-10 DIAGNOSIS — M62.838 MUSCLE SPASM: ICD-10-CM

## 2022-08-10 DIAGNOSIS — R10.9 ABDOMINAL PAIN: Primary | ICD-10-CM

## 2022-08-10 DIAGNOSIS — K59.00 CONSTIPATION, UNSPECIFIED CONSTIPATION TYPE: ICD-10-CM

## 2022-08-10 DIAGNOSIS — R10.31 RIGHT LOWER QUADRANT ABDOMINAL PAIN: ICD-10-CM

## 2022-08-10 LAB
ALBUMIN SERPL BCP-MCNC: 3.7 G/DL (ref 3.5–5.2)
ALP SERPL-CCNC: 120 U/L (ref 55–135)
ALT SERPL W/O P-5'-P-CCNC: 41 U/L (ref 10–44)
ANION GAP SERPL CALC-SCNC: 10 MMOL/L (ref 8–16)
AST SERPL-CCNC: 34 U/L (ref 10–40)
BASOPHILS # BLD AUTO: 0.03 K/UL (ref 0–0.2)
BASOPHILS NFR BLD: 0.3 % (ref 0–1.9)
BILIRUB SERPL-MCNC: 0.5 MG/DL (ref 0.1–1)
BILIRUB UR QL STRIP: NEGATIVE
BUN SERPL-MCNC: 22 MG/DL (ref 8–23)
CALCIUM SERPL-MCNC: 9.2 MG/DL (ref 8.7–10.5)
CHLORIDE SERPL-SCNC: 102 MMOL/L (ref 95–110)
CLARITY UR: CLEAR
CO2 SERPL-SCNC: 27 MMOL/L (ref 23–29)
COLOR UR: YELLOW
CREAT SERPL-MCNC: 1.5 MG/DL (ref 0.5–1.4)
DIFFERENTIAL METHOD: ABNORMAL
EOSINOPHIL # BLD AUTO: 0.2 K/UL (ref 0–0.5)
EOSINOPHIL NFR BLD: 1.6 % (ref 0–8)
ERYTHROCYTE [DISTWIDTH] IN BLOOD BY AUTOMATED COUNT: 13.2 % (ref 11.5–14.5)
EST. GFR  (NO RACE VARIABLE): 39.4 ML/MIN/1.73 M^2
GLUCOSE SERPL-MCNC: 116 MG/DL (ref 70–110)
GLUCOSE UR QL STRIP: NEGATIVE
HCT VFR BLD AUTO: 37.9 % (ref 37–48.5)
HGB BLD-MCNC: 12.9 G/DL (ref 12–16)
HGB UR QL STRIP: NEGATIVE
IMM GRANULOCYTES # BLD AUTO: 0.08 K/UL (ref 0–0.04)
IMM GRANULOCYTES NFR BLD AUTO: 0.7 % (ref 0–0.5)
KETONES UR QL STRIP: NEGATIVE
LEUKOCYTE ESTERASE UR QL STRIP: NEGATIVE
LIPASE SERPL-CCNC: 32 U/L (ref 4–60)
LYMPHOCYTES # BLD AUTO: 4.2 K/UL (ref 1–4.8)
LYMPHOCYTES NFR BLD: 38.6 % (ref 18–48)
MCH RBC QN AUTO: 29.1 PG (ref 27–31)
MCHC RBC AUTO-ENTMCNC: 34 G/DL (ref 32–36)
MCV RBC AUTO: 85 FL (ref 82–98)
MONOCYTES # BLD AUTO: 0.7 K/UL (ref 0.3–1)
MONOCYTES NFR BLD: 6.7 % (ref 4–15)
NEUTROPHILS # BLD AUTO: 5.7 K/UL (ref 1.8–7.7)
NEUTROPHILS NFR BLD: 52.1 % (ref 38–73)
NITRITE UR QL STRIP: NEGATIVE
NRBC BLD-RTO: 0 /100 WBC
PH UR STRIP: 6 [PH] (ref 5–8)
PLATELET # BLD AUTO: 296 K/UL (ref 150–450)
PMV BLD AUTO: 8.5 FL (ref 9.2–12.9)
POTASSIUM SERPL-SCNC: 4.3 MMOL/L (ref 3.5–5.1)
PROT SERPL-MCNC: 8.3 G/DL (ref 6–8.4)
PROT UR QL STRIP: NEGATIVE
RBC # BLD AUTO: 4.44 M/UL (ref 4–5.4)
SODIUM SERPL-SCNC: 139 MMOL/L (ref 136–145)
SP GR UR STRIP: 1.02 (ref 1–1.03)
URN SPEC COLLECT METH UR: NORMAL
UROBILINOGEN UR STRIP-ACNC: NEGATIVE EU/DL
WBC # BLD AUTO: 10.92 K/UL (ref 3.9–12.7)

## 2022-08-10 PROCEDURE — 99285 EMERGENCY DEPT VISIT HI MDM: CPT

## 2022-08-10 PROCEDURE — 80053 COMPREHEN METABOLIC PANEL: CPT | Performed by: EMERGENCY MEDICINE

## 2022-08-10 PROCEDURE — 93010 EKG 12-LEAD: ICD-10-PCS | Mod: ,,, | Performed by: INTERNAL MEDICINE

## 2022-08-10 PROCEDURE — 81003 URINALYSIS AUTO W/O SCOPE: CPT | Performed by: EMERGENCY MEDICINE

## 2022-08-10 PROCEDURE — 83690 ASSAY OF LIPASE: CPT | Performed by: EMERGENCY MEDICINE

## 2022-08-10 PROCEDURE — 93005 ELECTROCARDIOGRAM TRACING: CPT

## 2022-08-10 PROCEDURE — 85025 COMPLETE CBC W/AUTO DIFF WBC: CPT | Performed by: EMERGENCY MEDICINE

## 2022-08-10 PROCEDURE — 93010 ELECTROCARDIOGRAM REPORT: CPT | Mod: ,,, | Performed by: INTERNAL MEDICINE

## 2022-08-10 PROCEDURE — 74018 XR ABDOMEN AP 1 VIEW: ICD-10-PCS | Mod: 26,,, | Performed by: RADIOLOGY

## 2022-08-10 PROCEDURE — 74018 RADEX ABDOMEN 1 VIEW: CPT | Mod: TC

## 2022-08-10 PROCEDURE — 74018 RADEX ABDOMEN 1 VIEW: CPT | Mod: 26,,, | Performed by: RADIOLOGY

## 2022-08-10 RX ORDER — ONDANSETRON 4 MG/1
4 TABLET, FILM COATED ORAL EVERY 6 HOURS PRN
Qty: 15 TABLET | Refills: 0 | Status: SHIPPED | OUTPATIENT
Start: 2022-08-10

## 2022-08-10 RX ORDER — HYOSCYAMINE SULFATE 0.12 MG/1
0.12 TABLET SUBLINGUAL EVERY 4 HOURS PRN
Qty: 20 TABLET | Refills: 0 | Status: SHIPPED | OUTPATIENT
Start: 2022-08-10 | End: 2022-09-26

## 2022-08-10 RX ORDER — DICYCLOMINE HYDROCHLORIDE 20 MG/1
20 TABLET ORAL 2 TIMES DAILY
Qty: 20 TABLET | Refills: 0 | Status: SHIPPED | OUTPATIENT
Start: 2022-08-10 | End: 2022-09-09

## 2022-08-10 RX ORDER — DOCUSATE SODIUM 100 MG/1
100 CAPSULE, LIQUID FILLED ORAL 2 TIMES DAILY
Qty: 60 CAPSULE | Refills: 0 | Status: SHIPPED | OUTPATIENT
Start: 2022-08-10

## 2022-08-10 NOTE — DISCHARGE INSTRUCTIONS
Return emergency with any worsening abdominal pain, fever, chills, diarrhea, bloody diarrhea, bloody vomiting or any other concerning symptoms.  Follow-up with your primary care physician as necessary.

## 2022-08-10 NOTE — ED PROVIDER NOTES
"Encounter Date: 8/10/2022       History     Chief Complaint   Patient presents with    Abdominal Pain     Abdominal "cramps" x 2 days    Spasms     Leg cramps x 2 days     62-year-old female comes emergency complaints of right lower quadrant abdominal pain cramping in nature.  The cramping started her lower leg and then radiated upper leg into her low lower abdomen into the right side of her abdomen.  The pain was intense.  The patient came to emergency room for further evaluation.  Denies any nausea vomiting.  No fevers or chills.  Typically healthy otherwise.        Review of patient's allergies indicates:   Allergen Reactions    Adhesive     Latex Hives     Past Medical History:   Diagnosis Date    Allergy     Family hx of colon cancer 2013    Hyperlipidemia     Hypertension      Past Surgical History:   Procedure Laterality Date    BACK SURGERY      HYSTERECTOMY      NECK SURGERY      OOPHORECTOMY      TOTAL KNEE ARTHROPLASTY      TUBAL LIGATION       Family History   Problem Relation Age of Onset    Lung cancer Mother     Prostate cancer Father     Atrial fibrillation Father     Pacemaker/defibrilator Sister     Prostate cancer Brother     Breast cancer Neg Hx     Ovarian cancer Neg Hx      Social History     Tobacco Use    Smoking status: Never Smoker    Smokeless tobacco: Never Used   Substance Use Topics    Alcohol use: No    Drug use: No     Review of Systems   Constitutional: Negative.    HENT: Negative.  Negative for sinus pressure and sinus pain.    Respiratory: Negative.    Cardiovascular: Negative.    Gastrointestinal: Negative.  Negative for abdominal distention.   Genitourinary: Negative for difficulty urinating and dysuria.   Musculoskeletal: Negative.  Negative for arthralgias and back pain.   Neurological: Positive for headaches.   All other systems reviewed and are negative.      Physical Exam     Initial Vitals   BP Pulse Resp Temp SpO2   08/10/22 1002 08/10/22 0959 " 08/10/22 0959 08/10/22 0959 08/10/22 0959   131/79 90 20 98.3 °F (36.8 °C) 97 %      MAP       --                Physical Exam    Nursing note and vitals reviewed.  Constitutional: She appears well-developed and well-nourished.   HENT:   Head: Normocephalic and atraumatic.   Cardiovascular: Normal rate, regular rhythm and normal heart sounds.   Abdominal: Abdomen is soft. Bowel sounds are normal. She exhibits no distension. There is abdominal tenderness (Left lower quadrant mild tenderness to palpation.).   Musculoskeletal:         General: Normal range of motion.     Neurological: She is alert and oriented to person, place, and time. She has normal strength and normal reflexes.   Skin: Skin is warm.   Psychiatric: She has a normal mood and affect. Thought content normal.         ED Course   Procedures  Labs Reviewed   CBC W/ AUTO DIFFERENTIAL - Abnormal; Notable for the following components:       Result Value    MPV 8.5 (*)     Immature Granulocytes 0.7 (*)     Immature Grans (Abs) 0.08 (*)     All other components within normal limits    Narrative:     For upper or mid abdominal pain.   COMPREHENSIVE METABOLIC PANEL - Abnormal; Notable for the following components:    Glucose 116 (*)     Creatinine 1.5 (*)     eGFR 39.4 (*)     All other components within normal limits    Narrative:     For upper or mid abdominal pain.   URINALYSIS, REFLEX TO URINE CULTURE    Narrative:     In and Out Cath as needed it patient unable to void  Preferred Collection Type->Urine, Clean Catch  Specimen Source->Urine   LIPASE    Narrative:     For upper or mid abdominal pain.        ECG Results          EKG 12-lead (Final result)  Result time 08/10/22 12:30:02    Final result by Interface, Lab In [facility] (08/10/22 12:30:02)                 Narrative:    Test Reason : R10.9,    Vent. Rate : 077 BPM     Atrial Rate : 077 BPM     P-R Int : 144 ms          QRS Dur : 082 ms      QT Int : 374 ms       P-R-T Axes : 030 011 042 degrees     QTc  Int : 423 ms    Normal sinus rhythm  Moderate voltage criteria for LVH, may be normal variant  Borderline Abnormal ECG  When compared with ECG of 01-MAY-2020 07:45,  Premature atrial complexes are no longer Present  Confirmed by Eric Walter MD ([id number]) on 8/10/2022 12:29:56 PM    Referred By: JAME   SELF           Confirmed By:Eric Walter MD                            Imaging Results          X-Ray Abdomen AP 1 View (KUB) (Final result)  Result time 08/10/22 13:12:28    Final result by Aramis Zamudio MD (08/10/22 13:12:28)                 Impression:      No plain film evidence for bowel obstruction.      Electronically signed by: Aramis Zamudio  Date:    08/10/2022  Time:    13:12             Narrative:    EXAMINATION:  XR ABDOMEN AP 1 VIEW    CLINICAL HISTORY:  Unspecified abdominal pain    TECHNIQUE:  Supine views of the abdomen were performed.    COMPARISON:  None    FINDINGS:  Air and stool throughout the colon and rectum.  No plain film evidence for bowel obstruction.  No dilated loops of small bowel.    No significant abdominal calcifications.    Bony pelvis intact.  Mild levoscoliosis.  Prior posterior fusion lower lumbar spine.                                 Medications - No data to display  Medical Decision Making:   Differential Diagnosis:   Pancreatitis, colitis, diverticulitis, epiploic appendicitis, appendicitis, cholelithiasis, Meckel's diverticulum, volvulus, SBO, UTI.    ED Management:  X-rays negative.  Laboratory values are negative.  I will have the patient follow-up with primary care physician as necessary.  I will give the patient Bentyl to assist with cramping.  Also Levsin.  I will have the patient follow-up primary care physician as necessary.                      Clinical Impression:   Final diagnoses:  [R10.9] Abdominal pain (Primary)  [R10.31] Right lower quadrant abdominal pain  [M62.838] Muscle spasm  [K59.00] Constipation, unspecified constipation type          ED Disposition  Condition    Discharge Stable        ED Prescriptions     Medication Sig Dispense Start Date End Date Auth. Provider    dicyclomine (BENTYL) 20 mg tablet Take 1 tablet (20 mg total) by mouth 2 (two) times daily. 20 tablet 8/10/2022 9/9/2022 Manas Womack MD    hyoscyamine (LEVSIN/SL) 0.125 mg Subl Place 1 tablet (0.125 mg total) under the tongue every 4 (four) hours as needed. 20 tablet 8/10/2022  Manas Womack MD    ondansetron (ZOFRAN) 4 MG tablet Take 1 tablet (4 mg total) by mouth every 6 (six) hours as needed. 15 tablet 8/10/2022  Manas Womack MD    docusate sodium (COLACE) 100 MG capsule Take 1 capsule (100 mg total) by mouth 2 (two) times daily. 60 capsule 8/10/2022  Manas Womack MD        Follow-up Information     Follow up With Specialties Details Why Contact Info    Jim Lozano MD Family Medicine  As needed, If symptoms worsen [address] [place] [address] [address]  [facility] [address] [phone]             Manas Womack MD  08/10/22 132       Manas Womack MD  08/10/22 4453

## 2022-08-11 ENCOUNTER — HOSPITAL ENCOUNTER (OUTPATIENT)
Dept: RADIOLOGY | Facility: HOSPITAL | Age: 62
Discharge: HOME | End: 2022-08-11
Attending: SPECIALIST
Payer: MEDICARE

## 2022-08-11 DIAGNOSIS — M54.16 LUMBAR RADICULOPATHY: ICD-10-CM

## 2022-08-11 PROCEDURE — A9585 GADOBUTROL INJECTION: HCPCS

## 2022-08-11 PROCEDURE — 72158 MRI LUMBAR SPINE W/O & W/DYE: CPT | Mod: 26,,, | Performed by: RADIOLOGY

## 2022-08-11 PROCEDURE — 72158 MRI LUMBAR SPINE W/O & W/DYE: CPT | Mod: TC

## 2022-08-11 PROCEDURE — 25500020 PHARM REV CODE 255

## 2022-08-11 PROCEDURE — 72158 MRI LUMBAR SPINE W WO CONTRAST: ICD-10-PCS | Mod: 26,,, | Performed by: RADIOLOGY

## 2022-08-11 RX ORDER — GADOBUTROL 604.72 MG/ML
10 INJECTION INTRAVENOUS
Status: COMPLETED | OUTPATIENT
Start: 2022-08-11 | End: 2022-08-11

## 2022-08-11 RX ADMIN — GADOBUTROL 10 ML: 604.72 INJECTION INTRAVENOUS at 10:08

## 2022-09-02 ENCOUNTER — TELEPHONE (OUTPATIENT)
Dept: FAMILY MEDICINE | Facility: CLINIC | Age: 62
End: 2022-09-02
Payer: MEDICARE

## 2022-09-26 ENCOUNTER — OFFICE VISIT (OUTPATIENT)
Dept: FAMILY MEDICINE | Facility: CLINIC | Age: 62
End: 2022-09-26
Payer: MEDICARE

## 2022-09-26 VITALS
OXYGEN SATURATION: 97 % | RESPIRATION RATE: 17 BRPM | SYSTOLIC BLOOD PRESSURE: 128 MMHG | HEIGHT: 68 IN | HEART RATE: 90 BPM | WEIGHT: 239.13 LBS | DIASTOLIC BLOOD PRESSURE: 76 MMHG | BODY MASS INDEX: 36.24 KG/M2

## 2022-09-26 DIAGNOSIS — E78.49 OTHER HYPERLIPIDEMIA: ICD-10-CM

## 2022-09-26 DIAGNOSIS — M47.12 OSTEOARTHRITIS OF CERVICAL SPINE WITH MYELOPATHY: ICD-10-CM

## 2022-09-26 DIAGNOSIS — I10 ESSENTIAL HYPERTENSION: Primary | ICD-10-CM

## 2022-09-26 PROCEDURE — 1159F MED LIST DOCD IN RCRD: CPT | Mod: CPTII,S$GLB,, | Performed by: FAMILY MEDICINE

## 2022-09-26 PROCEDURE — 4010F ACE/ARB THERAPY RXD/TAKEN: CPT | Mod: CPTII,S$GLB,, | Performed by: FAMILY MEDICINE

## 2022-09-26 PROCEDURE — 3074F SYST BP LT 130 MM HG: CPT | Mod: CPTII,S$GLB,, | Performed by: FAMILY MEDICINE

## 2022-09-26 PROCEDURE — 99999 PR PBB SHADOW E&M-EST. PATIENT-LVL IV: CPT | Mod: PBBFAC,,, | Performed by: FAMILY MEDICINE

## 2022-09-26 PROCEDURE — 3078F DIAST BP <80 MM HG: CPT | Mod: CPTII,S$GLB,, | Performed by: FAMILY MEDICINE

## 2022-09-26 PROCEDURE — 1160F RVW MEDS BY RX/DR IN RCRD: CPT | Mod: CPTII,S$GLB,, | Performed by: FAMILY MEDICINE

## 2022-09-26 PROCEDURE — 3044F HG A1C LEVEL LT 7.0%: CPT | Mod: CPTII,S$GLB,, | Performed by: FAMILY MEDICINE

## 2022-09-26 PROCEDURE — 3044F PR MOST RECENT HEMOGLOBIN A1C LEVEL <7.0%: ICD-10-PCS | Mod: CPTII,S$GLB,, | Performed by: FAMILY MEDICINE

## 2022-09-26 PROCEDURE — 3008F PR BODY MASS INDEX (BMI) DOCUMENTED: ICD-10-PCS | Mod: CPTII,S$GLB,, | Performed by: FAMILY MEDICINE

## 2022-09-26 PROCEDURE — 3078F PR MOST RECENT DIASTOLIC BLOOD PRESSURE < 80 MM HG: ICD-10-PCS | Mod: CPTII,S$GLB,, | Performed by: FAMILY MEDICINE

## 2022-09-26 PROCEDURE — 4010F PR ACE/ARB THEARPY RXD/TAKEN: ICD-10-PCS | Mod: CPTII,S$GLB,, | Performed by: FAMILY MEDICINE

## 2022-09-26 PROCEDURE — 3008F BODY MASS INDEX DOCD: CPT | Mod: CPTII,S$GLB,, | Performed by: FAMILY MEDICINE

## 2022-09-26 PROCEDURE — 99214 PR OFFICE/OUTPT VISIT, EST, LEVL IV, 30-39 MIN: ICD-10-PCS | Mod: S$GLB,,, | Performed by: FAMILY MEDICINE

## 2022-09-26 PROCEDURE — 3074F PR MOST RECENT SYSTOLIC BLOOD PRESSURE < 130 MM HG: ICD-10-PCS | Mod: CPTII,S$GLB,, | Performed by: FAMILY MEDICINE

## 2022-09-26 PROCEDURE — 1160F PR REVIEW ALL MEDS BY PRESCRIBER/CLIN PHARMACIST DOCUMENTED: ICD-10-PCS | Mod: CPTII,S$GLB,, | Performed by: FAMILY MEDICINE

## 2022-09-26 PROCEDURE — 1159F PR MEDICATION LIST DOCUMENTED IN MEDICAL RECORD: ICD-10-PCS | Mod: CPTII,S$GLB,, | Performed by: FAMILY MEDICINE

## 2022-09-26 PROCEDURE — 99214 OFFICE O/P EST MOD 30 MIN: CPT | Mod: S$GLB,,, | Performed by: FAMILY MEDICINE

## 2022-09-26 PROCEDURE — 99999 PR PBB SHADOW E&M-EST. PATIENT-LVL IV: ICD-10-PCS | Mod: PBBFAC,,, | Performed by: FAMILY MEDICINE

## 2022-09-26 NOTE — LETTER
September 26, 2022    Stephan Abraham  [address] [facility] [place] [address]             [address]  [place] [address]  Phone: [phone]  Fax: [phone] To whom it may concern:    I had the pleasure of seeing Stephan Abraham  in the office for surgical clearance. After performing a physical exam, and reviewing the appropriate studies, Stephan Abraham is considered low risk for surgery and is cleared for surgery.     If you have any questions or concerns, please don't hesitate to call.    Sincerely,        Jim Lozano MD

## 2022-09-26 NOTE — PROGRESS NOTES
" Patient ID: Stephan Abraham is a 62 y.o. female.    Chief Complaint: Follow-up      Reviewed family, medical, surgical, and social history.    No cp/sob  No change in mental status  No fever  No asymmetrical limb swelling    Objective:      /76 (BP Location: Left arm, Patient Position: Sitting, BP Method: Medium (Manual))   Pulse 90   Resp 17   Ht 5' 8" (1.727 m)   Wt 108.5 kg (239 lb 1.6 oz)   LMP  (LMP Unknown)   SpO2 97%   BMI 36.36 kg/m²   RRR, CTAB, s/nt/nd, no c/c/e, non-toxic appearing, no focal weakness  Assessment:       1. Essential hypertension    2. Other hyperlipidemia    3. Osteoarthritis of cervical spine with myelopathy        Plan:       Essential hypertension    Other hyperlipidemia    Osteoarthritis of cervical spine with myelopathy            Continue current medicines, any changes noted above  Well controlled, cleared for surgery  Labs, radiology studies, and procedures/notes from the last 3 months were reviewed.    Risks, benefits, and side effects were discussed with the patient. All questions were answered to the fullest satisfaction of the patient, and pt verbalized understanding and agreement to treatment plan. Pt was to call with any new or worsening symptoms, or present to the ER.      "

## 2022-10-06 ENCOUNTER — HOSPITAL ENCOUNTER (EMERGENCY)
Facility: HOSPITAL | Age: 62
Discharge: HOME | End: 2022-10-06
Attending: FAMILY MEDICINE
Payer: MEDICARE

## 2022-10-06 VITALS
WEIGHT: 250 LBS | BODY MASS INDEX: 35.79 KG/M2 | SYSTOLIC BLOOD PRESSURE: 111 MMHG | OXYGEN SATURATION: 99 % | DIASTOLIC BLOOD PRESSURE: 52 MMHG | HEIGHT: 70 IN | TEMPERATURE: 99 F | RESPIRATION RATE: 18 BRPM | HEART RATE: 101 BPM

## 2022-10-06 DIAGNOSIS — N30.01 ACUTE CYSTITIS WITH HEMATURIA: Primary | ICD-10-CM

## 2022-10-06 DIAGNOSIS — Z98.890 HISTORY OF BACK SURGERY: ICD-10-CM

## 2022-10-06 LAB
ALBUMIN SERPL BCP-MCNC: 3.6 G/DL (ref 3.5–5.2)
ALP SERPL-CCNC: 104 U/L (ref 55–135)
ALT SERPL W/O P-5'-P-CCNC: 54 U/L (ref 10–44)
ANION GAP SERPL CALC-SCNC: 16 MMOL/L (ref 8–16)
AST SERPL-CCNC: 61 U/L (ref 10–40)
BACTERIA #/AREA URNS HPF: ABNORMAL /HPF
BASOPHILS # BLD AUTO: 0.05 K/UL (ref 0–0.2)
BASOPHILS NFR BLD: 0.3 % (ref 0–1.9)
BILIRUB SERPL-MCNC: 2.5 MG/DL (ref 0.1–1)
BILIRUB UR QL STRIP: NEGATIVE
BUN SERPL-MCNC: 16 MG/DL (ref 8–23)
CALCIUM SERPL-MCNC: 9.9 MG/DL (ref 8.7–10.5)
CHLORIDE SERPL-SCNC: 97 MMOL/L (ref 95–110)
CLARITY UR: ABNORMAL
CO2 SERPL-SCNC: 27 MMOL/L (ref 23–29)
COLOR UR: YELLOW
CREAT SERPL-MCNC: 1.4 MG/DL (ref 0.5–1.4)
DIFFERENTIAL METHOD: ABNORMAL
EOSINOPHIL # BLD AUTO: 0 K/UL (ref 0–0.5)
EOSINOPHIL NFR BLD: 0.2 % (ref 0–8)
ERYTHROCYTE [DISTWIDTH] IN BLOOD BY AUTOMATED COUNT: 13.7 % (ref 11.5–14.5)
EST. GFR  (NO RACE VARIABLE): 42.8 ML/MIN/1.73 M^2
GLUCOSE SERPL-MCNC: 119 MG/DL (ref 70–110)
GLUCOSE UR QL STRIP: NEGATIVE
HCT VFR BLD AUTO: 36 % (ref 37–48.5)
HGB BLD-MCNC: 12 G/DL (ref 12–16)
HGB UR QL STRIP: ABNORMAL
HYALINE CASTS #/AREA URNS LPF: 0 /LPF
IMM GRANULOCYTES # BLD AUTO: 0.17 K/UL (ref 0–0.04)
IMM GRANULOCYTES NFR BLD AUTO: 0.9 % (ref 0–0.5)
KETONES UR QL STRIP: NEGATIVE
LACTATE SERPL-SCNC: 2.1 MMOL/L (ref 0.5–2.2)
LEUKOCYTE ESTERASE UR QL STRIP: ABNORMAL
LYMPHOCYTES # BLD AUTO: 4.3 K/UL (ref 1–4.8)
LYMPHOCYTES NFR BLD: 21.8 % (ref 18–48)
MCH RBC QN AUTO: 28.8 PG (ref 27–31)
MCHC RBC AUTO-ENTMCNC: 33.3 G/DL (ref 32–36)
MCV RBC AUTO: 86 FL (ref 82–98)
MICROSCOPIC COMMENT: ABNORMAL
MONOCYTES # BLD AUTO: 1.6 K/UL (ref 0.3–1)
MONOCYTES NFR BLD: 8.2 % (ref 4–15)
NEUTROPHILS # BLD AUTO: 13.6 K/UL (ref 1.8–7.7)
NEUTROPHILS NFR BLD: 68.6 % (ref 38–73)
NITRITE UR QL STRIP: NEGATIVE
NRBC BLD-RTO: 0 /100 WBC
PH UR STRIP: 6 [PH] (ref 5–8)
PLATELET # BLD AUTO: 287 K/UL (ref 150–450)
PMV BLD AUTO: 9.6 FL (ref 9.2–12.9)
POTASSIUM SERPL-SCNC: 4 MMOL/L (ref 3.5–5.1)
PROT SERPL-MCNC: 8.7 G/DL (ref 6–8.4)
PROT UR QL STRIP: ABNORMAL
RBC # BLD AUTO: 4.17 M/UL (ref 4–5.4)
RBC #/AREA URNS HPF: 3 /HPF (ref 0–4)
SODIUM SERPL-SCNC: 140 MMOL/L (ref 136–145)
SP GR UR STRIP: 1.01 (ref 1–1.03)
URN SPEC COLLECT METH UR: ABNORMAL
UROBILINOGEN UR STRIP-ACNC: 1 EU/DL
WBC # BLD AUTO: 19.8 K/UL (ref 3.9–12.7)
WBC #/AREA URNS HPF: >100 /HPF (ref 0–5)

## 2022-10-06 PROCEDURE — 99284 EMERGENCY DEPT VISIT MOD MDM: CPT | Mod: 25

## 2022-10-06 PROCEDURE — 96375 TX/PRO/DX INJ NEW DRUG ADDON: CPT

## 2022-10-06 PROCEDURE — 83605 ASSAY OF LACTIC ACID: CPT | Performed by: FAMILY MEDICINE

## 2022-10-06 PROCEDURE — 63600175 PHARM REV CODE 636 W HCPCS: Performed by: FAMILY MEDICINE

## 2022-10-06 PROCEDURE — 96365 THER/PROPH/DIAG IV INF INIT: CPT

## 2022-10-06 PROCEDURE — 96361 HYDRATE IV INFUSION ADD-ON: CPT

## 2022-10-06 PROCEDURE — 81000 URINALYSIS NONAUTO W/SCOPE: CPT | Performed by: FAMILY MEDICINE

## 2022-10-06 PROCEDURE — 36415 COLL VENOUS BLD VENIPUNCTURE: CPT | Performed by: FAMILY MEDICINE

## 2022-10-06 PROCEDURE — 25000003 PHARM REV CODE 250: Performed by: FAMILY MEDICINE

## 2022-10-06 PROCEDURE — 87040 BLOOD CULTURE FOR BACTERIA: CPT | Mod: 59 | Performed by: FAMILY MEDICINE

## 2022-10-06 PROCEDURE — 85025 COMPLETE CBC W/AUTO DIFF WBC: CPT | Performed by: FAMILY MEDICINE

## 2022-10-06 PROCEDURE — 80053 COMPREHEN METABOLIC PANEL: CPT | Performed by: FAMILY MEDICINE

## 2022-10-06 RX ORDER — ONDANSETRON 2 MG/ML
4 INJECTION INTRAMUSCULAR; INTRAVENOUS
Status: COMPLETED | OUTPATIENT
Start: 2022-10-06 | End: 2022-10-06

## 2022-10-06 RX ORDER — MORPHINE SULFATE 4 MG/ML
4 INJECTION, SOLUTION INTRAMUSCULAR; INTRAVENOUS
Status: COMPLETED | OUTPATIENT
Start: 2022-10-06 | End: 2022-10-06

## 2022-10-06 RX ORDER — CEPHALEXIN 500 MG/1
500 CAPSULE ORAL EVERY 8 HOURS
Qty: 21 CAPSULE | Refills: 0 | Status: SHIPPED | OUTPATIENT
Start: 2022-10-06 | End: 2022-10-13

## 2022-10-06 RX ADMIN — MORPHINE SULFATE 4 MG: 4 INJECTION INTRAVENOUS at 09:10

## 2022-10-06 RX ADMIN — SODIUM CHLORIDE 500 ML: 0.9 INJECTION, SOLUTION INTRAVENOUS at 10:10

## 2022-10-06 RX ADMIN — ONDANSETRON 4 MG: 2 INJECTION INTRAMUSCULAR; INTRAVENOUS at 09:10

## 2022-10-06 RX ADMIN — CEFTRIAXONE 2 G: 2 INJECTION, SOLUTION INTRAVENOUS at 11:10

## 2022-10-06 NOTE — ED PROVIDER NOTES
"Encounter Date: 10/6/2022       History     Chief Complaint   Patient presents with    Back Pain     Pt had back surgery on Monday. States that she had normal amount of post operative pain and woke up this morning with back bleeding and in more severe pain.      Patient presents the ED ambulatory complaining of pain in her back in the area of her incision, status post back surgery "bone clean out" on October 3rd at [facility] in [place] per general surgeon Dr. Stephan Rand, patient denies any headache head trauma fall but states she feels achy all over and that her pain meds are not working she denies any nausea vomiting or chest pain    Review of patient's allergies indicates:   Allergen Reactions    Adhesive     Latex Hives     Past Medical History:   Diagnosis Date    Allergy     Family hx of colon cancer 2013    Hyperlipidemia     Hypertension      Past Surgical History:   Procedure Laterality Date    BACK SURGERY      HYSTERECTOMY      NECK SURGERY      OOPHORECTOMY      TOTAL KNEE ARTHROPLASTY      TUBAL LIGATION       Family History   Problem Relation Age of Onset    Lung cancer Mother     Prostate cancer Father     Atrial fibrillation Father     Pacemaker/defibrilator Sister     Prostate cancer Brother     Breast cancer Neg Hx     Ovarian cancer Neg Hx      Social History     Tobacco Use    Smoking status: Never    Smokeless tobacco: Never   Substance Use Topics    Alcohol use: No    Drug use: No     Review of Systems   Constitutional:  Negative for fever.   HENT:  Negative for sore throat.    Eyes:  Negative for discharge.   Respiratory:  Negative for shortness of breath.    Cardiovascular:  Negative for chest pain.   Gastrointestinal:  Negative for nausea.   Genitourinary:  Positive for dysuria and frequency.   Musculoskeletal:  Negative for back pain.   Skin:  Negative for rash.   Neurological:  Negative for dizziness, weakness, light-headedness and numbness.   Hematological:  Does not bruise/bleed " easily.   Psychiatric/Behavioral:  Negative for agitation and confusion. The patient is not nervous/anxious.      Physical Exam     Initial Vitals [10/06/22 0900]   BP Pulse Resp Temp SpO2   110/87 108 (!) 22 99.1 °F (37.3 °C) 96 %      MAP       --         Physical Exam    Nursing note and vitals reviewed.  Constitutional: She is not diaphoretic. No distress.   Patient is very obese   HENT:   Head: Normocephalic and atraumatic.   Eyes: Pupils are equal, round, and reactive to light. Right eye exhibits no discharge. Left eye exhibits no discharge.   Neck: No tracheal deviation present. No JVD present.   Cardiovascular:      Exam reveals no friction rub.       No murmur heard.  Pulmonary/Chest: No stridor. No respiratory distress. She has no wheezes. She has no rales.   Abdominal: Bowel sounds are normal. She exhibits no distension.   Soft nontender   Musculoskeletal:         General: Normal range of motion.      Comments: 5 cm incision in the upper lumbar spine is intact the superior aspect is having some moderate serous discharge, there is no foul odor there is no on discharge     Neurological: She is alert.   Skin: Skin is warm.   Psychiatric: She has a normal mood and affect.       ED Course   Procedures  Labs Reviewed   CBC W/ AUTO DIFFERENTIAL - Abnormal; Notable for the following components:       Result Value    WBC 19.80 (*)     Hematocrit 36.0 (*)     Immature Granulocytes 0.9 (*)     Gran # (ANC) 13.6 (*)     Immature Grans (Abs) 0.17 (*)     Mono # 1.6 (*)     All other components within normal limits   COMPREHENSIVE METABOLIC PANEL - Abnormal; Notable for the following components:    Glucose 119 (*)     Total Protein 8.7 (*)     Total Bilirubin 2.5 (*)     AST 61 (*)     ALT 54 (*)     eGFR 42.8 (*)     All other components within normal limits   URINALYSIS - Abnormal; Notable for the following components:    Appearance, UA Cloudy (*)     Protein, UA 1+ (*)     Occult Blood UA 1+ (*)     Leukocytes, UA  1+ (*)     All other components within normal limits   URINALYSIS MICROSCOPIC - Abnormal; Notable for the following components:    WBC, UA >100 (*)     Bacteria Many (*)     All other components within normal limits   CULTURE, BLOOD   CULTURE, BLOOD   LACTIC ACID, PLASMA          Imaging Results    None          Medications   ondansetron injection 4 mg (4 mg Intravenous Given 10/6/22 0933)   morphine injection 4 mg (4 mg Intravenous Given 10/6/22 0933)   sodium chloride 0.9% bolus 500 mL (0 mLs Intravenous Stopped 10/6/22 1115)   cefTRIAXone (ROCEPHIN) 2 g/50 mL D5W IVPB (0 g Intravenous Stopped 10/6/22 1229)     Medical Decision Making:   ED Management:  Case discussed with Dr. Stephan Rand through his nurse and his personal phone as he was in the operating room, he agrees with treatment with IV then oral outpatient antibiotics and close follow-up with his clinic                        Clinical Impression:   Final diagnoses:  [N30.01] Acute cystitis with hematuria (Primary)  [Z98.890] History of back surgery        ED Disposition Condition    Discharge Stable          ED Prescriptions       Medication Sig Dispense Start Date End Date Auth. Provider    cephALEXin (KEFLEX) 500 MG capsule Take 1 capsule (500 mg total) by mouth every 8 (eight) hours. for 7 days 21 capsule 10/6/2022 10/13/2022 Anton House MD          Follow-up Information    None          Anton House MD  10/06/22 1142

## 2022-10-06 NOTE — ED NOTES
MD on phone with surgeon. States not to culture surgical site and send her home on outpatient antibiotics.

## 2022-10-06 NOTE — ED NOTES
Patient would like communication of their results via:        Cell Phone:   Telephone Information:   Mobile [phone]   .  Okay to leave a message containing results? Yes     Pt up on bedside commode. Unable to void at this time. MD notified.

## 2022-10-06 NOTE — ED NOTES
Blood pulled from lab insufficient amount for lactic acid and blood cultures. Collection turned to lab collect and lab personnel notified via phone.

## 2022-10-06 NOTE — DISCHARGE INSTRUCTIONS
Continue with your current prescribed hydrocodone pain pills p.r.n. pain, contact her nurse surgeon's office Dr. Stephan Rand for follow-up this week

## 2022-10-11 LAB
BACTERIA BLD CULT: NORMAL
BACTERIA BLD CULT: NORMAL

## 2022-11-22 ENCOUNTER — HOSPITAL ENCOUNTER (OUTPATIENT)
Dept: RADIOLOGY | Facility: HOSPITAL | Age: 62
Discharge: HOME | End: 2022-11-22
Attending: FAMILY MEDICINE
Payer: MEDICARE

## 2022-11-22 DIAGNOSIS — Z12.31 VISIT FOR SCREENING MAMMOGRAM: ICD-10-CM

## 2022-11-22 PROCEDURE — 77067 SCR MAMMO BI INCL CAD: CPT | Mod: TC

## 2022-11-22 PROCEDURE — 77063 BREAST TOMOSYNTHESIS BI: CPT | Mod: TC

## 2022-11-22 PROCEDURE — 77063 MAMMO DIGITAL SCREENING BILAT WITH TOMO: ICD-10-PCS | Mod: 26,,, | Performed by: RADIOLOGY

## 2022-11-22 PROCEDURE — 77067 SCR MAMMO BI INCL CAD: CPT | Mod: 26,,, | Performed by: RADIOLOGY

## 2022-11-22 PROCEDURE — 77063 BREAST TOMOSYNTHESIS BI: CPT | Mod: 26,,, | Performed by: RADIOLOGY

## 2022-11-22 PROCEDURE — 77067 MAMMO DIGITAL SCREENING BILAT WITH TOMO: ICD-10-PCS | Mod: 26,,, | Performed by: RADIOLOGY

## 2022-11-23 DIAGNOSIS — R92.2 INCONCLUSIVE MAMMOGRAM: Primary | ICD-10-CM

## 2022-12-07 ENCOUNTER — HOSPITAL ENCOUNTER (OUTPATIENT)
Dept: RADIOLOGY | Facility: HOSPITAL | Age: 62
Discharge: HOME | End: 2022-12-07
Payer: MEDICARE

## 2022-12-07 DIAGNOSIS — R92.2 INCONCLUSIVE MAMMOGRAM: ICD-10-CM

## 2022-12-07 PROCEDURE — 77065 DX MAMMO INCL CAD UNI: CPT | Mod: 26,RT,, | Performed by: RADIOLOGY

## 2022-12-07 PROCEDURE — 77061 BREAST TOMOSYNTHESIS UNI: CPT | Mod: 26,RT,, | Performed by: RADIOLOGY

## 2022-12-07 PROCEDURE — 77065 DX MAMMO INCL CAD UNI: CPT | Mod: TC,RT

## 2022-12-07 PROCEDURE — 77061 MAMMO DIGITAL DIAGNOSTIC RIGHT WITH TOMO: ICD-10-PCS | Mod: 26,RT,, | Performed by: RADIOLOGY

## 2022-12-07 PROCEDURE — 77065 MAMMO DIGITAL DIAGNOSTIC RIGHT WITH TOMO: ICD-10-PCS | Mod: 26,RT,, | Performed by: RADIOLOGY

## 2022-12-29 ENCOUNTER — HOSPITAL ENCOUNTER (OUTPATIENT)
Dept: RADIOLOGY | Facility: HOSPITAL | Age: 62
Discharge: HOME | End: 2022-12-29
Payer: MEDICARE

## 2022-12-29 VITALS — HEIGHT: 68 IN | WEIGHT: 250 LBS | BODY MASS INDEX: 37.89 KG/M2

## 2022-12-29 DIAGNOSIS — R92.8 ABNORMAL MAMMOGRAM OF RIGHT BREAST: ICD-10-CM

## 2022-12-29 DIAGNOSIS — R92.0 MICROCALCIFICATION OF RIGHT BREAST ON MAMMOGRAM: ICD-10-CM

## 2022-12-29 PROCEDURE — 19081 BX BREAST 1ST LESION STRTCTC: CPT | Mod: RT,,, | Performed by: RADIOLOGY

## 2022-12-29 PROCEDURE — 19081 BX BREAST 1ST LESION STRTCTC: CPT | Mod: RT

## 2022-12-29 PROCEDURE — 88342 CHG IMMUNOCYTOCHEMISTRY: ICD-10-PCS | Mod: 26,,, | Performed by: PATHOLOGY

## 2022-12-29 PROCEDURE — 88341 PR IHC OR ICC EACH ADD'L SINGLE ANTIBODY  STAINPR: ICD-10-PCS | Mod: 26,,, | Performed by: PATHOLOGY

## 2022-12-29 PROCEDURE — 88341 IMHCHEM/IMCYTCHM EA ADD ANTB: CPT | Performed by: PATHOLOGY

## 2022-12-29 PROCEDURE — 88305 TISSUE EXAM BY PATHOLOGIST: ICD-10-PCS | Mod: 26,,, | Performed by: PATHOLOGY

## 2022-12-29 PROCEDURE — A4550 SURGICAL TRAYS: HCPCS

## 2022-12-29 PROCEDURE — 19081 MAMMO BREAST STEREOTACTIC BREAST BIOPSY RIGHT: ICD-10-PCS | Mod: RT,,, | Performed by: RADIOLOGY

## 2022-12-29 PROCEDURE — 88341 IMHCHEM/IMCYTCHM EA ADD ANTB: CPT | Mod: 26,,, | Performed by: PATHOLOGY

## 2022-12-29 PROCEDURE — 25000003 PHARM REV CODE 250: Performed by: RADIOLOGY

## 2022-12-29 PROCEDURE — 88342 IMHCHEM/IMCYTCHM 1ST ANTB: CPT | Mod: 26,,, | Performed by: PATHOLOGY

## 2022-12-29 PROCEDURE — 88342 IMHCHEM/IMCYTCHM 1ST ANTB: CPT | Performed by: PATHOLOGY

## 2022-12-29 PROCEDURE — 88305 TISSUE EXAM BY PATHOLOGIST: CPT | Performed by: PATHOLOGY

## 2022-12-29 PROCEDURE — 88305 TISSUE EXAM BY PATHOLOGIST: CPT | Mod: 26,,, | Performed by: PATHOLOGY

## 2022-12-29 RX ORDER — SODIUM BICARBONATE 42 MG/ML
2.5 INJECTION, SOLUTION INTRAVENOUS ONCE
Status: COMPLETED | OUTPATIENT
Start: 2022-12-29 | End: 2022-12-29

## 2022-12-29 RX ORDER — LIDOCAINE HYDROCHLORIDE 10 MG/ML
1 INJECTION INFILTRATION; PERINEURAL ONCE
Status: COMPLETED | OUTPATIENT
Start: 2022-12-29 | End: 2022-12-29

## 2022-12-29 RX ORDER — LIDOCAINE HYDROCHLORIDE AND EPINEPHRINE 10; 10 MG/ML; UG/ML
1 INJECTION, SOLUTION INFILTRATION; PERINEURAL ONCE
Status: COMPLETED | OUTPATIENT
Start: 2022-12-29 | End: 2022-12-29

## 2022-12-29 RX ADMIN — LIDOCAINE HYDROCHLORIDE,EPINEPHRINE BITARTRATE 1 ML: 10; .01 INJECTION, SOLUTION INFILTRATION; PERINEURAL at 11:12

## 2022-12-29 RX ADMIN — SODIUM BICARBONATE 5 ML: 42 INJECTION, SOLUTION INTRAVENOUS at 11:12

## 2022-12-29 RX ADMIN — LIDOCAINE HYDROCHLORIDE 1 ML: 10 INJECTION, SOLUTION INFILTRATION; PERINEURAL at 11:12

## 2022-12-29 NOTE — PROGRESS NOTES
12/29/22 1130   Sterile Procedure Checklist   Confirm That All Persons in Room Cleanse  Hands Done   Prep Procedure Site (N/A for Intubations) Done  (Consent Signed @ 1124AM)   Use Large Drape To Cover Patient (N/A for Intubation) Done   Wear Sterile Gloves, Hat, Mask with Eye Shield, and Sterile Gown Done   Sterile Field Maintained Throughout Procedure Including Dressing Application Yes   Are All Persons in the Room Wearing a Mask and a Hat  --    Other Checklist Items   Daily Meds Taken Yes   Allergy Meds Reviewed and Updated Yes   Latex Allergy Yes   Allergy Bracelet On Yes   NPO Start 0030   Patient Pregnant No   Discharge transport (for same day patients) Other (Comment)  (Self)   Rad Studies Available? Yes   Lab Results Available? Yes   Antibiotics Ordered/Given? No   Bath/Shower, Skin Prep Yes

## 2023-01-05 LAB
FINAL PATHOLOGIC DIAGNOSIS: NORMAL
GROSS: NORMAL
Lab: NORMAL

## 2023-01-09 DIAGNOSIS — D36.9 INTRADUCTAL PAPILLOMA: Primary | ICD-10-CM

## 2023-01-13 ENCOUNTER — OFFICE VISIT (OUTPATIENT)
Dept: SURGERY | Facility: CLINIC | Age: 63
End: 2023-01-13
Payer: MEDICARE

## 2023-01-13 VITALS
DIASTOLIC BLOOD PRESSURE: 69 MMHG | HEIGHT: 68 IN | OXYGEN SATURATION: 96 % | SYSTOLIC BLOOD PRESSURE: 138 MMHG | HEART RATE: 109 BPM | BODY MASS INDEX: 35.31 KG/M2 | WEIGHT: 233 LBS

## 2023-01-13 DIAGNOSIS — D36.9 INTRADUCTAL PAPILLOMA: Primary | ICD-10-CM

## 2023-01-13 PROCEDURE — 99214 PR OFFICE/OUTPT VISIT, EST, LEVL IV, 30-39 MIN: ICD-10-PCS | Mod: S$GLB,,, | Performed by: STUDENT IN AN ORGANIZED HEALTH CARE EDUCATION/TRAINING PROGRAM

## 2023-01-13 PROCEDURE — 3078F DIAST BP <80 MM HG: CPT | Mod: CPTII,S$GLB,, | Performed by: STUDENT IN AN ORGANIZED HEALTH CARE EDUCATION/TRAINING PROGRAM

## 2023-01-13 PROCEDURE — 1159F PR MEDICATION LIST DOCUMENTED IN MEDICAL RECORD: ICD-10-PCS | Mod: CPTII,S$GLB,, | Performed by: STUDENT IN AN ORGANIZED HEALTH CARE EDUCATION/TRAINING PROGRAM

## 2023-01-13 PROCEDURE — 99214 OFFICE O/P EST MOD 30 MIN: CPT | Mod: S$GLB,,, | Performed by: STUDENT IN AN ORGANIZED HEALTH CARE EDUCATION/TRAINING PROGRAM

## 2023-01-13 PROCEDURE — 3075F SYST BP GE 130 - 139MM HG: CPT | Mod: CPTII,S$GLB,, | Performed by: STUDENT IN AN ORGANIZED HEALTH CARE EDUCATION/TRAINING PROGRAM

## 2023-01-13 PROCEDURE — 3008F BODY MASS INDEX DOCD: CPT | Mod: CPTII,S$GLB,, | Performed by: STUDENT IN AN ORGANIZED HEALTH CARE EDUCATION/TRAINING PROGRAM

## 2023-01-13 PROCEDURE — 3008F PR BODY MASS INDEX (BMI) DOCUMENTED: ICD-10-PCS | Mod: CPTII,S$GLB,, | Performed by: STUDENT IN AN ORGANIZED HEALTH CARE EDUCATION/TRAINING PROGRAM

## 2023-01-13 PROCEDURE — 99999 PR PBB SHADOW E&M-EST. PATIENT-LVL V: ICD-10-PCS | Mod: PBBFAC,,, | Performed by: STUDENT IN AN ORGANIZED HEALTH CARE EDUCATION/TRAINING PROGRAM

## 2023-01-13 PROCEDURE — 3075F PR MOST RECENT SYSTOLIC BLOOD PRESS GE 130-139MM HG: ICD-10-PCS | Mod: CPTII,S$GLB,, | Performed by: STUDENT IN AN ORGANIZED HEALTH CARE EDUCATION/TRAINING PROGRAM

## 2023-01-13 PROCEDURE — 99999 PR PBB SHADOW E&M-EST. PATIENT-LVL V: CPT | Mod: PBBFAC,,, | Performed by: STUDENT IN AN ORGANIZED HEALTH CARE EDUCATION/TRAINING PROGRAM

## 2023-01-13 PROCEDURE — 1159F MED LIST DOCD IN RCRD: CPT | Mod: CPTII,S$GLB,, | Performed by: STUDENT IN AN ORGANIZED HEALTH CARE EDUCATION/TRAINING PROGRAM

## 2023-01-13 PROCEDURE — 3078F PR MOST RECENT DIASTOLIC BLOOD PRESSURE < 80 MM HG: ICD-10-PCS | Mod: CPTII,S$GLB,, | Performed by: STUDENT IN AN ORGANIZED HEALTH CARE EDUCATION/TRAINING PROGRAM

## 2023-01-13 RX ORDER — PENICILLIN V POTASSIUM 500 MG/1
TABLET, FILM COATED ORAL
COMMUNITY
Start: 2022-12-23

## 2023-01-13 RX ORDER — SODIUM CHLORIDE 9 MG/ML
INJECTION, SOLUTION INTRAVENOUS CONTINUOUS
Status: CANCELLED | OUTPATIENT
Start: 2023-01-13

## 2023-01-13 NOTE — H&P (VIEW-ONLY)
[facility] H&P Note    Subjective:       Patient ID: Stephan Abraham is a 62 y.o. female.    Chief Complaint:  Intraductal papilloma  HPI:  Stephan Abraham is a 62 y.o. female with hypertension and hyperlipidemia presents today for evaluation for right breast intraductal papilloma.  Patient underwent routine screening mammography couple of months ago.  Abnormal mammo on the right side prompted stereotactic biopsy.  This returned as intraductal papilloma.  Patient referred for surgical excision.  Patient has no personal or family history of breast cancer.  She denies any palpable masses or changes in the breast.  No breast pain.  No nipple discharge.  She is not a smoker.  She is not on any hormone therapy.    Past Medical History:   Diagnosis Date    Allergy     Family hx of colon cancer 2013    Hyperlipidemia     Hypertension      Past Surgical History:   Procedure Laterality Date    BACK SURGERY      HYSTERECTOMY      NECK SURGERY      OOPHORECTOMY      TOTAL KNEE ARTHROPLASTY      TUBAL LIGATION       Family History   Problem Relation Age of Onset    Lung cancer Mother     Prostate cancer Father     Atrial fibrillation Father     Pacemaker/defibrilator Sister     Prostate cancer Brother     Breast cancer Neg Hx     Ovarian cancer Neg Hx      Social History     Socioeconomic History    Marital status: Single   Tobacco Use    Smoking status: Never    Smokeless tobacco: Never   Substance and Sexual Activity    Alcohol use: No    Drug use: No    Sexual activity: Yes     Partners: Male       Current Outpatient Medications   Medication Sig Dispense Refill    aspirin 325 MG tablet Take 325 mg by mouth 2 (two) times daily.      benazepril-hydrochlorthiazide (LOTENSIN HCT) 20-12.5 mg per tablet Take 1 tablet by mouth once daily. 90 tablet 3    cyclobenzaprine (FLEXERIL) 10 MG tablet TAKE 1 TABLET THREE TIMES DAILY AS NEEDED FOR MUSCLE SPASM(S) 270 tablet 3    docusate sodium (COLACE) 100 MG capsule Take 1  capsule (100 mg total) by mouth 2 (two) times daily. 60 capsule 0    gabapentin (NEURONTIN) 400 MG capsule Take 1 capsule (400 mg total) by mouth 3 (three) times daily. 270 capsule 3    loratadine (CLARITIN) 10 mg tablet TAKE 1 TABLET EVERY DAY 90 tablet 3    meloxicam (MOBIC) 15 MG tablet Take 1 tablet (15 mg total) by mouth once daily. 90 tablet 3    nystatin-triamcinolone (MYCOLOG) ointment Apply topically 2 (two) times daily. 60 g 5    ondansetron (ZOFRAN) 4 MG tablet Take 1 tablet (4 mg total) by mouth every 6 (six) hours as needed. 15 tablet 0    penicillin v potassium (VEETID) 500 MG tablet TAKE 1 TABLET BY MOUTH EVERY 6 HOURS UNTIL ALL TAKEN. TAKE ON AN EMPTY STOMACH 1 HOUR BEFORE OR 2 HOURS AFTER MEALS      pravastatin (PRAVACHOL) 20 MG tablet Take 1 tablet (20 mg total) by mouth once daily. 90 tablet 3    traMADoL (ULTRAM) 50 mg tablet TAKE 1 TABLET EVERY 8 HOURS AS NEEDED FOR PAIN 90 tablet 0    triamcinolone acetonide 0.1% (KENALOG) 0.1 % cream Apply topically 2 (two) times daily.      estradioL (ESTRACE) 0.01 % (0.1 mg/gram) vaginal cream Place 1 g vaginally twice a week. Apply daily for 2 weeks, then twice a week 8 g 11     No current facility-administered medications for this visit.     Review of patient's allergies indicates:   Allergen Reactions    Adhesive     Latex Hives       Review of Systems   Constitutional:  Negative for activity change, appetite change, chills and fever.   HENT:  Negative for congestion, dental problem and ear discharge.    Eyes:  Negative for discharge and itching.   Respiratory:  Negative for apnea, choking and chest tightness.    Cardiovascular:  Negative for chest pain and leg swelling.   Gastrointestinal:  Negative for abdominal distention, abdominal pain, anal bleeding, constipation, diarrhea and nausea.   Endocrine: Negative for cold intolerance and heat intolerance.   Genitourinary:  Negative for difficulty urinating and dyspareunia.   Musculoskeletal:  Negative for  "arthralgias and back pain.   Skin:  Negative for color change and pallor.   Neurological:  Negative for dizziness and facial asymmetry.   Hematological:  Negative for adenopathy. Does not bruise/bleed easily.   Psychiatric/Behavioral:  Negative for agitation and behavioral problems.      Objective:      Vitals:    01/13/23 0907   BP: 138/69   Pulse: 109   SpO2: 96%   Weight: 105.7 kg (233 lb)   Height: 5' 8" (1.727 m)     Physical Exam  Constitutional:       General: She is not in acute distress.     Appearance: She is well-developed.   HENT:      Head: Normocephalic and atraumatic.   Eyes:      Pupils: Pupils are equal, round, and reactive to light.   Neck:      Thyroid: No thyromegaly.   Cardiovascular:      Rate and Rhythm: Normal rate and regular rhythm.   Pulmonary:      Effort: Pulmonary effort is normal.      Breath sounds: Normal breath sounds.   Chest:   Breasts:     Right: Normal. No bleeding, inverted nipple, mass, skin change or tenderness.      Left: Normal. No bleeding, inverted nipple, mass, skin change or tenderness.   Abdominal:      General: Bowel sounds are normal. There is no distension.      Palpations: Abdomen is soft.      Tenderness: There is no abdominal tenderness.   Musculoskeletal:         General: No deformity. Normal range of motion.      Cervical back: Normal range of motion and neck supple.   Lymphadenopathy:      Upper Body:      Right upper body: No axillary adenopathy.      Left upper body: No axillary adenopathy.   Skin:     General: Skin is warm.      Capillary Refill: Capillary refill takes less than 2 seconds.      Findings: No erythema.   Neurological:      Mental Status: She is alert and oriented to person, place, and time.      Cranial Nerves: No cranial nerve deficit.   Psychiatric:         Behavior: Behavior normal.       Lab Review: CBC:   Lab Results   Component Value Date    WBC 19.80 (H) 10/06/2022    RBC 4.17 10/06/2022    HGB 12.0 10/06/2022    HCT 36.0 (L) " 10/06/2022     10/06/2022     BMP:   Lab Results   Component Value Date     (H) 10/06/2022     10/06/2022    K 4.0 10/06/2022    CL 97 10/06/2022    CO2 27 10/06/2022    BUN 16 10/06/2022    CREATININE 1.4 10/06/2022    CALCIUM 9.9 10/06/2022     Diagnostics Review:  Mammo reviewed, path results reviewed     Assessment:       1. Intraductal papilloma          Plan:   Intraductal papilloma  -     Ambulatory referral/consult to General Surgery  -     Full code; Standing  -     Place in Outpatient; Standing  -     Case Request Operating Room: EXCISION, MASS, BREAST  -     Vital signs; Standing  -     Insert peripheral IV; Standing  -     Verify beta-blocker dose taken within 24 hours if patient is prescribed beta-blocker; Standing  -     Height and weight; Standing  -     Intake and output; Standing  -     Verify discontinuation of antithrombotics; Standing  -     POCT glucose; Standing  -     Verify blood consent; Standing  -     Verify consent; Standing  -     Diet NPO; Standing  -     Place CORNELIA hose; Standing  -     Place sequential compression device; Standing    Other orders  -     0.9%  NaCl infusion  -     IP VTE LOW RISK PATIENT; Standing  -     ceFAZolin (ANCEF) 2 g in dextrose 5 % 50 mL IVPB        Medical Decision Making/Counseling:  Current guidelines recommend excisional biopsy of intraductal papilloma found on core needle biopsy due to risk of associated DCIS.  We will plan for SEAMUS  radar reflector placement preop.  Risks, benefits, alternatives to procedure discussed with the patient and all of her questions were answered.  She agrees to proceed.    Patient instructed that best way to communicate with my office staff is for patient to get on the [facility] epic patient portal to expedite communication and communication issues that may occur.  Patient was given instructions on how to get on the portal.  I encouraged patient to obtain portal access as well.  Ultimately it is up to  the patient to obtain access.  Patient voiced understanding.

## 2023-01-13 NOTE — PATIENT INSTRUCTIONS
Pre-operative Instructions     Date of procedure:  Tuesday February 7th      Pre-OP Instructions  On the night of Monday Feb 6th Scrub the surgical area with Hibiclens for several minutes  On the morning of Tuesday Feb 7th scrub the surgical area with Hibiclens for several minutes  Do not shave or clip hair at the site of your surgery  Do not wear jewelry to the hospital.  You will have to remove it.  Leave at home so that it is not lost.    Food/Drink   Do not eat or drink after Midnight.    Location of Department:   [facility] [address], [place] [address]    Parking:    Use parking lot A  Enter at the main hospital entrance  Check in with outpatient registration    Contact:   Someone from surgery will call you with a time of arrival.   If you surgery is on Monday, someone will contact you on Friday.  If you do not receive a call from surgery by 2pm the business day (Monday Feb 6th) before your procedure, please call [phone].     Medications:   You may take your normal medications with a small sip of water.    Do not take the following Medications for 3 days prior to your procedure:   Aspirin, Excedrin, BC powder or goodies powders   Ibuprofen or Motrin  Naproxen or Aleve          Transportation:   You will NOT be able to drive yourself.  You are required to have someone drive you home from the hospital due to the anesthesia.            Post-operative Instructions      RESTRICTIONS:   During your procedure today, you received medications for sedation.     These medications may affect your judgement, balance, and coordination.     DO NOT drive a car, operate machinery, make legal/financial decisions, sign important papers or drink alcohol on day of procedure.     ACTIVITY:   After your surgery you CANNOT LIFT anything over 10 pounds, no pushing, no pulling, no bending or no strenuous exercise UNTIL released by doctor.  Do not bath until your sutures or staples are  removed.    DIET AND MEDICATIONS    May eat and drink normally unless instructed otherwise.     Continue present medications unless otherwise instructed     TREATMENT FOR COMMON SIDE EFFECTS:   Pain medication is prescribed to be taken as needed, NOT on a schedule. Use pain medication for periods of high activity and before sleep. Providers have limits on amounts of medications that they may prescribe. Use pain medication properly to assure availability.     Because air was used during your procedure you may experience mild abdominal pain, nausea, belching, bloating or excessive gas: walking, rest, eat lightly and use a heating pad to help alleviate discomfort.     Sore throat: treat with throat lozenges and/or gargle with warm salt water.     If a bowel prep was taken, you may not have a bowel movement for 1-3 days. This is normal.     IF YOU HAVE ANY OF THE SYMPTOMS BELOW, REPORT TO YOUR PHYSICIAN:     1. Excessive or unexpected pain in back or abdomen     2. Signs of infection such as: chills or fever occurring within 24 hours after the procedure.     3. Rectal bleeding, which would show as bright red, maroon, or black stools. (A tablespoon of blood from the rectum is not serious, especially if hemorrhoids are present.)     4. Vomiting     5. Weakness or dizziness.     IF YOU EXPERIENCE ANY OF THE FOLLOWING, GO DIRECTLY TO THE NEAREST EMERGENCY ROOM:  1. Difficulty breathing     2. Chills and/or fever over 101 F     3. Persistent vomiting and/or vomiting blood     4. Severe abdominal pain     5. Chest pain     6. Black, tarry stools     7. Bleeding-more than one tablespoon     8. Any other symptoms or condition that you feel may need urgent attention.       YOUR DOCTOR RECOMMENDS THESE ADDITIONAL INSTRUCTIONS:     1. If any biopsies were taken, your results will be discussed at your follow up appointment     2. Further recommendations will depend on how you are recovering from you procedure

## 2023-01-13 NOTE — H&P
[facility] H&P Note    Subjective:       Patient ID: Stephan Abraham is a 62 y.o. female.    Chief Complaint:  Intraductal papilloma  HPI:  Stephan Abraham is a 62 y.o. female with hypertension and hyperlipidemia presents today for evaluation for right breast intraductal papilloma.  Patient underwent routine screening mammography couple of months ago.  Abnormal mammo on the right side prompted stereotactic biopsy.  This returned as intraductal papilloma.  Patient referred for surgical excision.  Patient has no personal or family history of breast cancer.  She denies any palpable masses or changes in the breast.  No breast pain.  No nipple discharge.  She is not a smoker.  She is not on any hormone therapy.    Past Medical History:   Diagnosis Date    Allergy     Family hx of colon cancer 2013    Hyperlipidemia     Hypertension      Past Surgical History:   Procedure Laterality Date    BACK SURGERY      HYSTERECTOMY      NECK SURGERY      OOPHORECTOMY      TOTAL KNEE ARTHROPLASTY      TUBAL LIGATION       Family History   Problem Relation Age of Onset    Lung cancer Mother     Prostate cancer Father     Atrial fibrillation Father     Pacemaker/defibrilator Sister     Prostate cancer Brother     Breast cancer Neg Hx     Ovarian cancer Neg Hx      Social History     Socioeconomic History    Marital status: Single   Tobacco Use    Smoking status: Never    Smokeless tobacco: Never   Substance and Sexual Activity    Alcohol use: No    Drug use: No    Sexual activity: Yes     Partners: Male       Current Outpatient Medications   Medication Sig Dispense Refill    aspirin 325 MG tablet Take 325 mg by mouth 2 (two) times daily.      benazepril-hydrochlorthiazide (LOTENSIN HCT) 20-12.5 mg per tablet Take 1 tablet by mouth once daily. 90 tablet 3    cyclobenzaprine (FLEXERIL) 10 MG tablet TAKE 1 TABLET THREE TIMES DAILY AS NEEDED FOR MUSCLE SPASM(S) 270 tablet 3    docusate sodium (COLACE) 100 MG capsule Take 1  capsule (100 mg total) by mouth 2 (two) times daily. 60 capsule 0    gabapentin (NEURONTIN) 400 MG capsule Take 1 capsule (400 mg total) by mouth 3 (three) times daily. 270 capsule 3    loratadine (CLARITIN) 10 mg tablet TAKE 1 TABLET EVERY DAY 90 tablet 3    meloxicam (MOBIC) 15 MG tablet Take 1 tablet (15 mg total) by mouth once daily. 90 tablet 3    nystatin-triamcinolone (MYCOLOG) ointment Apply topically 2 (two) times daily. 60 g 5    ondansetron (ZOFRAN) 4 MG tablet Take 1 tablet (4 mg total) by mouth every 6 (six) hours as needed. 15 tablet 0    penicillin v potassium (VEETID) 500 MG tablet TAKE 1 TABLET BY MOUTH EVERY 6 HOURS UNTIL ALL TAKEN. TAKE ON AN EMPTY STOMACH 1 HOUR BEFORE OR 2 HOURS AFTER MEALS      pravastatin (PRAVACHOL) 20 MG tablet Take 1 tablet (20 mg total) by mouth once daily. 90 tablet 3    traMADoL (ULTRAM) 50 mg tablet TAKE 1 TABLET EVERY 8 HOURS AS NEEDED FOR PAIN 90 tablet 0    triamcinolone acetonide 0.1% (KENALOG) 0.1 % cream Apply topically 2 (two) times daily.      estradioL (ESTRACE) 0.01 % (0.1 mg/gram) vaginal cream Place 1 g vaginally twice a week. Apply daily for 2 weeks, then twice a week 8 g 11     No current facility-administered medications for this visit.     Review of patient's allergies indicates:   Allergen Reactions    Adhesive     Latex Hives       Review of Systems   Constitutional:  Negative for activity change, appetite change, chills and fever.   HENT:  Negative for congestion, dental problem and ear discharge.    Eyes:  Negative for discharge and itching.   Respiratory:  Negative for apnea, choking and chest tightness.    Cardiovascular:  Negative for chest pain and leg swelling.   Gastrointestinal:  Negative for abdominal distention, abdominal pain, anal bleeding, constipation, diarrhea and nausea.   Endocrine: Negative for cold intolerance and heat intolerance.   Genitourinary:  Negative for difficulty urinating and dyspareunia.   Musculoskeletal:  Negative for  "arthralgias and back pain.   Skin:  Negative for color change and pallor.   Neurological:  Negative for dizziness and facial asymmetry.   Hematological:  Negative for adenopathy. Does not bruise/bleed easily.   Psychiatric/Behavioral:  Negative for agitation and behavioral problems.      Objective:      Vitals:    01/13/23 0907   BP: 138/69   Pulse: 109   SpO2: 96%   Weight: 105.7 kg (233 lb)   Height: 5' 8" (1.727 m)     Physical Exam  Constitutional:       General: She is not in acute distress.     Appearance: She is well-developed.   HENT:      Head: Normocephalic and atraumatic.   Eyes:      Pupils: Pupils are equal, round, and reactive to light.   Neck:      Thyroid: No thyromegaly.   Cardiovascular:      Rate and Rhythm: Normal rate and regular rhythm.   Pulmonary:      Effort: Pulmonary effort is normal.      Breath sounds: Normal breath sounds.   Chest:   Breasts:     Right: Normal. No bleeding, inverted nipple, mass, skin change or tenderness.      Left: Normal. No bleeding, inverted nipple, mass, skin change or tenderness.   Abdominal:      General: Bowel sounds are normal. There is no distension.      Palpations: Abdomen is soft.      Tenderness: There is no abdominal tenderness.   Musculoskeletal:         General: No deformity. Normal range of motion.      Cervical back: Normal range of motion and neck supple.   Lymphadenopathy:      Upper Body:      Right upper body: No axillary adenopathy.      Left upper body: No axillary adenopathy.   Skin:     General: Skin is warm.      Capillary Refill: Capillary refill takes less than 2 seconds.      Findings: No erythema.   Neurological:      Mental Status: She is alert and oriented to person, place, and time.      Cranial Nerves: No cranial nerve deficit.   Psychiatric:         Behavior: Behavior normal.       Lab Review: CBC:   Lab Results   Component Value Date    WBC 19.80 (H) 10/06/2022    RBC 4.17 10/06/2022    HGB 12.0 10/06/2022    HCT 36.0 (L) " 10/06/2022     10/06/2022     BMP:   Lab Results   Component Value Date     (H) 10/06/2022     10/06/2022    K 4.0 10/06/2022    CL 97 10/06/2022    CO2 27 10/06/2022    BUN 16 10/06/2022    CREATININE 1.4 10/06/2022    CALCIUM 9.9 10/06/2022     Diagnostics Review:  Mammo reviewed, path results reviewed     Assessment:       1. Intraductal papilloma          Plan:   Intraductal papilloma  -     Ambulatory referral/consult to General Surgery  -     Full code; Standing  -     Place in Outpatient; Standing  -     Case Request Operating Room: EXCISION, MASS, BREAST  -     Vital signs; Standing  -     Insert peripheral IV; Standing  -     Verify beta-blocker dose taken within 24 hours if patient is prescribed beta-blocker; Standing  -     Height and weight; Standing  -     Intake and output; Standing  -     Verify discontinuation of antithrombotics; Standing  -     POCT glucose; Standing  -     Verify blood consent; Standing  -     Verify consent; Standing  -     Diet NPO; Standing  -     Place CORNELIA hose; Standing  -     Place sequential compression device; Standing    Other orders  -     0.9%  NaCl infusion  -     IP VTE LOW RISK PATIENT; Standing  -     ceFAZolin (ANCEF) 2 g in dextrose 5 % 50 mL IVPB        Medical Decision Making/Counseling:  Current guidelines recommend excisional biopsy of intraductal papilloma found on core needle biopsy due to risk of associated DCIS.  We will plan for SEAMUS  radar reflector placement preop.  Risks, benefits, alternatives to procedure discussed with the patient and all of her questions were answered.  She agrees to proceed.    Patient instructed that best way to communicate with my office staff is for patient to get on the [facility] patient portal to expedite communication and communication issues that may occur.  Patient was given instructions on how to get on the portal.  I encouraged patient to obtain portal access as well.  Ultimately it is up to  the patient to obtain access.  Patient voiced understanding.

## 2023-02-03 ENCOUNTER — HOSPITAL ENCOUNTER (OUTPATIENT)
Dept: RADIOLOGY | Facility: HOSPITAL | Age: 63
Discharge: HOME | End: 2023-02-03
Attending: STUDENT IN AN ORGANIZED HEALTH CARE EDUCATION/TRAINING PROGRAM
Payer: MEDICARE

## 2023-02-03 ENCOUNTER — HOSPITAL ENCOUNTER (OUTPATIENT)
Dept: PREADMISSION TESTING | Facility: HOSPITAL | Age: 63
Discharge: HOME | End: 2023-02-03
Attending: STUDENT IN AN ORGANIZED HEALTH CARE EDUCATION/TRAINING PROGRAM
Payer: MEDICARE

## 2023-02-03 ENCOUNTER — ANESTHESIA EVENT (OUTPATIENT)
Dept: SURGERY | Facility: HOSPITAL | Age: 63
End: 2023-02-03
Payer: MEDICARE

## 2023-02-03 DIAGNOSIS — Z01.818 PRE-OP EXAMINATION: ICD-10-CM

## 2023-02-03 DIAGNOSIS — N60.99 ATYPICAL HYPERPLASIA OF BREAST: ICD-10-CM

## 2023-02-03 PROCEDURE — 99900103 DSU ONLY-NO CHARGE-INITIAL HR (STAT)

## 2023-02-03 NOTE — ANESTHESIA PREPROCEDURE EVALUATION
02/03/2023  Stephan Abraham is a 62 y.o., female.      Pre-op Assessment    I have reviewed the Patient Summary Reports.     I have reviewed the Nursing Notes. I have reviewed the NPO Status.   I have reviewed the Medications.     Review of Systems  Anesthesia Hx:  No problems with previous Anesthesia  Neg history of prior surgery. Denies Family Hx of Anesthesia complications.   Denies Personal Hx of Anesthesia complications.   Social:  Non-Smoker    Hematology/Oncology:  Hematology Normal   Oncology Normal     EENT/Dental:EENT/Dental Normal   Cardiovascular:   Hypertension, well controlled    Pulmonary:  Pulmonary Normal    Renal/:  Renal/ Normal     Hepatic/GI:  Hepatic/GI Normal    Musculoskeletal:   Arthritis     Neurological:  Neurology Normal    Endocrine:  Endocrine Normal    Dermatological:  Skin Normal    Psych:  Psychiatric Normal           Physical Exam  General: Alert    Airway:  Mallampati: II   Mouth Opening: Normal  TM Distance: Normal  Neck ROM: Normal ROM    Dental:  Dentures    Chest/Lungs:  Clear to auscultation, Normal Respiratory Rate    Heart:  Rate: Normal    Abdomen:  Normal        Anesthesia Plan  Type of Anesthesia, risks & benefits discussed:    Anesthesia Type: Gen ETT, Gen Supraglottic Airway  Intra-op Monitoring Plan: Standard ASA Monitors  Post Op Pain Control Plan: multimodal analgesia  Airway Plan: Direct, Post-Induction  Informed Consent: Informed consent signed with the Patient and all parties understand the risks and agree with anesthesia plan.  All questions answered. Patient consented to blood products? No  ASA Score: 2  Day of Surgery Review of History & Physical: H&P Update referred to the surgeon/provider.    Ready For Surgery From Anesthesia Perspective.     .

## 2023-02-07 ENCOUNTER — HOSPITAL ENCOUNTER (OUTPATIENT)
Facility: HOSPITAL | Age: 63
Discharge: HOME | End: 2023-02-07
Attending: STUDENT IN AN ORGANIZED HEALTH CARE EDUCATION/TRAINING PROGRAM | Admitting: STUDENT IN AN ORGANIZED HEALTH CARE EDUCATION/TRAINING PROGRAM
Payer: MEDICARE

## 2023-02-07 ENCOUNTER — ANESTHESIA (OUTPATIENT)
Dept: SURGERY | Facility: HOSPITAL | Age: 63
End: 2023-02-07
Payer: MEDICARE

## 2023-02-07 DIAGNOSIS — Z98.890 STATUS POST BREAST BIOPSY: ICD-10-CM

## 2023-02-07 DIAGNOSIS — D36.9 INTRADUCTAL PAPILLOMA: ICD-10-CM

## 2023-02-07 DIAGNOSIS — D24.1 INTRADUCTAL PAPILLOMA OF BREAST, RIGHT: ICD-10-CM

## 2023-02-07 PROCEDURE — 88307 PR  SURG PATH,LEVEL V: ICD-10-PCS | Mod: 26,,, | Performed by: PATHOLOGY

## 2023-02-07 PROCEDURE — 63600175 PHARM REV CODE 636 W HCPCS: Performed by: ANESTHESIOLOGY

## 2023-02-07 PROCEDURE — 25000003 PHARM REV CODE 250: Performed by: STUDENT IN AN ORGANIZED HEALTH CARE EDUCATION/TRAINING PROGRAM

## 2023-02-07 PROCEDURE — 63600175 PHARM REV CODE 636 W HCPCS: Performed by: NURSE ANESTHETIST, CERTIFIED REGISTERED

## 2023-02-07 PROCEDURE — 63600175 PHARM REV CODE 636 W HCPCS: Performed by: STUDENT IN AN ORGANIZED HEALTH CARE EDUCATION/TRAINING PROGRAM

## 2023-02-07 PROCEDURE — 25000003 PHARM REV CODE 250: Performed by: NURSE ANESTHETIST, CERTIFIED REGISTERED

## 2023-02-07 PROCEDURE — 36000706: Performed by: STUDENT IN AN ORGANIZED HEALTH CARE EDUCATION/TRAINING PROGRAM

## 2023-02-07 PROCEDURE — 19125 PR EXCISE BREAST LES W XRAY MARKER: ICD-10-PCS | Mod: RT,,, | Performed by: STUDENT IN AN ORGANIZED HEALTH CARE EDUCATION/TRAINING PROGRAM

## 2023-02-07 PROCEDURE — 71000033 HC RECOVERY, INTIAL HOUR: Performed by: STUDENT IN AN ORGANIZED HEALTH CARE EDUCATION/TRAINING PROGRAM

## 2023-02-07 PROCEDURE — 37000008 HC ANESTHESIA 1ST 15 MINUTES: Performed by: STUDENT IN AN ORGANIZED HEALTH CARE EDUCATION/TRAINING PROGRAM

## 2023-02-07 PROCEDURE — 71000039 HC RECOVERY, EACH ADD'L HOUR: Performed by: STUDENT IN AN ORGANIZED HEALTH CARE EDUCATION/TRAINING PROGRAM

## 2023-02-07 PROCEDURE — 88307 TISSUE EXAM BY PATHOLOGIST: CPT | Mod: 26,,, | Performed by: PATHOLOGY

## 2023-02-07 PROCEDURE — D9220A PRA ANESTHESIA: ICD-10-PCS | Mod: CRNA,,, | Performed by: NURSE ANESTHETIST, CERTIFIED REGISTERED

## 2023-02-07 PROCEDURE — D9220A PRA ANESTHESIA: Mod: CRNA,,, | Performed by: NURSE ANESTHETIST, CERTIFIED REGISTERED

## 2023-02-07 PROCEDURE — 19125 EXCISION BREAST LESION: CPT | Mod: RT,,, | Performed by: STUDENT IN AN ORGANIZED HEALTH CARE EDUCATION/TRAINING PROGRAM

## 2023-02-07 PROCEDURE — 36000707: Performed by: STUDENT IN AN ORGANIZED HEALTH CARE EDUCATION/TRAINING PROGRAM

## 2023-02-07 PROCEDURE — D9220A PRA ANESTHESIA: ICD-10-PCS | Mod: ANES,,, | Performed by: ANESTHESIOLOGY

## 2023-02-07 PROCEDURE — 37000009 HC ANESTHESIA EA ADD 15 MINS: Performed by: STUDENT IN AN ORGANIZED HEALTH CARE EDUCATION/TRAINING PROGRAM

## 2023-02-07 PROCEDURE — 88307 TISSUE EXAM BY PATHOLOGIST: CPT | Performed by: PATHOLOGY

## 2023-02-07 PROCEDURE — D9220A PRA ANESTHESIA: Mod: ANES,,, | Performed by: ANESTHESIOLOGY

## 2023-02-07 PROCEDURE — 71000015 HC POSTOP RECOV 1ST HR: Performed by: STUDENT IN AN ORGANIZED HEALTH CARE EDUCATION/TRAINING PROGRAM

## 2023-02-07 RX ORDER — CEFAZOLIN SODIUM 2 G/50ML
SOLUTION INTRAVENOUS
Status: COMPLETED
Start: 2023-02-07 | End: 2023-02-07

## 2023-02-07 RX ORDER — DIPHENHYDRAMINE HYDROCHLORIDE 50 MG/ML
12.5 INJECTION INTRAMUSCULAR; INTRAVENOUS
Status: DISCONTINUED | OUTPATIENT
Start: 2023-02-07 | End: 2023-02-07 | Stop reason: HOSPADM

## 2023-02-07 RX ORDER — SODIUM CHLORIDE 9 MG/ML
INJECTION, SOLUTION INTRAVENOUS CONTINUOUS
Status: DISCONTINUED | OUTPATIENT
Start: 2023-02-07 | End: 2023-02-07 | Stop reason: HOSPADM

## 2023-02-07 RX ORDER — LIDOCAINE HYDROCHLORIDE 20 MG/ML
INJECTION, SOLUTION EPIDURAL; INFILTRATION; INTRACAUDAL; PERINEURAL
Status: DISCONTINUED | OUTPATIENT
Start: 2023-02-07 | End: 2023-02-07

## 2023-02-07 RX ORDER — FENTANYL CITRATE 50 UG/ML
INJECTION, SOLUTION INTRAMUSCULAR; INTRAVENOUS
Status: DISCONTINUED | OUTPATIENT
Start: 2023-02-07 | End: 2023-02-07

## 2023-02-07 RX ORDER — BUPIVACAINE HYDROCHLORIDE 5 MG/ML
INJECTION, SOLUTION EPIDURAL; INTRACAUDAL
Status: DISCONTINUED | OUTPATIENT
Start: 2023-02-07 | End: 2023-02-07 | Stop reason: HOSPADM

## 2023-02-07 RX ORDER — SODIUM CHLORIDE, SODIUM LACTATE, POTASSIUM CHLORIDE, CALCIUM CHLORIDE 600; 310; 30; 20 MG/100ML; MG/100ML; MG/100ML; MG/100ML
INJECTION, SOLUTION INTRAVENOUS CONTINUOUS
Status: DISCONTINUED | OUTPATIENT
Start: 2023-02-07 | End: 2023-02-07 | Stop reason: HOSPADM

## 2023-02-07 RX ORDER — MORPHINE SULFATE 4 MG/ML
2 INJECTION, SOLUTION INTRAMUSCULAR; INTRAVENOUS EVERY 5 MIN PRN
Status: DISCONTINUED | OUTPATIENT
Start: 2023-02-07 | End: 2023-02-07 | Stop reason: HOSPADM

## 2023-02-07 RX ORDER — ACETAMINOPHEN 10 MG/ML
INJECTION, SOLUTION INTRAVENOUS
Status: DISCONTINUED | OUTPATIENT
Start: 2023-02-07 | End: 2023-02-07

## 2023-02-07 RX ORDER — OXYCODONE AND ACETAMINOPHEN 5; 325 MG/1; MG/1
1 TABLET ORAL EVERY 6 HOURS PRN
Qty: 28 TABLET | Refills: 0 | Status: SHIPPED | OUTPATIENT
Start: 2023-02-07

## 2023-02-07 RX ORDER — PROPOFOL 10 MG/ML
VIAL (ML) INTRAVENOUS
Status: DISCONTINUED | OUTPATIENT
Start: 2023-02-07 | End: 2023-02-07

## 2023-02-07 RX ORDER — MIDAZOLAM HYDROCHLORIDE 1 MG/ML
INJECTION INTRAMUSCULAR; INTRAVENOUS
Status: DISCONTINUED | OUTPATIENT
Start: 2023-02-07 | End: 2023-02-07

## 2023-02-07 RX ORDER — ONDANSETRON 2 MG/ML
4 INJECTION INTRAMUSCULAR; INTRAVENOUS DAILY PRN
Status: DISCONTINUED | OUTPATIENT
Start: 2023-02-07 | End: 2023-02-07 | Stop reason: HOSPADM

## 2023-02-07 RX ORDER — KETOROLAC TROMETHAMINE 30 MG/ML
15 INJECTION, SOLUTION INTRAMUSCULAR; INTRAVENOUS ONCE
Status: COMPLETED | OUTPATIENT
Start: 2023-02-07 | End: 2023-02-07

## 2023-02-07 RX ORDER — ONDANSETRON 2 MG/ML
INJECTION INTRAMUSCULAR; INTRAVENOUS
Status: DISCONTINUED | OUTPATIENT
Start: 2023-02-07 | End: 2023-02-07

## 2023-02-07 RX ORDER — SODIUM CHLORIDE, SODIUM LACTATE, POTASSIUM CHLORIDE, CALCIUM CHLORIDE 600; 310; 30; 20 MG/100ML; MG/100ML; MG/100ML; MG/100ML
125 INJECTION, SOLUTION INTRAVENOUS CONTINUOUS
Status: DISCONTINUED | OUTPATIENT
Start: 2023-02-07 | End: 2023-02-07 | Stop reason: HOSPADM

## 2023-02-07 RX ORDER — CEFAZOLIN SODIUM 2 G/50ML
2 SOLUTION INTRAVENOUS
Status: COMPLETED | OUTPATIENT
Start: 2023-02-07 | End: 2023-02-07

## 2023-02-07 RX ORDER — LIDOCAINE HYDROCHLORIDE 10 MG/ML
1 INJECTION, SOLUTION EPIDURAL; INFILTRATION; INTRACAUDAL; PERINEURAL ONCE
Status: DISCONTINUED | OUTPATIENT
Start: 2023-02-07 | End: 2023-02-07 | Stop reason: HOSPADM

## 2023-02-07 RX ORDER — PHENYLEPHRINE HYDROCHLORIDE 10 MG/ML
INJECTION INTRAVENOUS
Status: DISCONTINUED | OUTPATIENT
Start: 2023-02-07 | End: 2023-02-07

## 2023-02-07 RX ORDER — DEXAMETHASONE SODIUM PHOSPHATE 4 MG/ML
INJECTION, SOLUTION INTRA-ARTICULAR; INTRALESIONAL; INTRAMUSCULAR; INTRAVENOUS; SOFT TISSUE
Status: DISCONTINUED | OUTPATIENT
Start: 2023-02-07 | End: 2023-02-07

## 2023-02-07 RX ADMIN — DEXAMETHASONE SODIUM PHOSPHATE 4 MG: 4 INJECTION, SOLUTION INTRAMUSCULAR; INTRAVENOUS at 10:02

## 2023-02-07 RX ADMIN — FENTANYL CITRATE 50 MCG: 50 INJECTION, SOLUTION INTRAMUSCULAR; INTRAVENOUS at 10:02

## 2023-02-07 RX ADMIN — LIDOCAINE HYDROCHLORIDE 40 MG: 20 INJECTION, SOLUTION EPIDURAL; INFILTRATION; INTRACAUDAL; PERINEURAL at 10:02

## 2023-02-07 RX ADMIN — MORPHINE SULFATE 2 MG: 4 INJECTION, SOLUTION INTRAMUSCULAR; INTRAVENOUS at 12:02

## 2023-02-07 RX ADMIN — PROPOFOL 200 MG: 10 INJECTION, EMULSION INTRAVENOUS at 10:02

## 2023-02-07 RX ADMIN — ONDANSETRON 4 MG: 2 INJECTION INTRAMUSCULAR; INTRAVENOUS at 12:02

## 2023-02-07 RX ADMIN — KETOROLAC TROMETHAMINE 15 MG: 30 INJECTION, SOLUTION INTRAMUSCULAR; INTRAVENOUS at 12:02

## 2023-02-07 RX ADMIN — PHENYLEPHRINE HYDROCHLORIDE 100 MCG: 10 INJECTION INTRAVENOUS at 11:02

## 2023-02-07 RX ADMIN — ACETAMINOPHEN 1000 MG: 10 INJECTION, SOLUTION INTRAVENOUS at 10:02

## 2023-02-07 RX ADMIN — FENTANYL CITRATE 50 MCG: 50 INJECTION, SOLUTION INTRAMUSCULAR; INTRAVENOUS at 11:02

## 2023-02-07 RX ADMIN — MIDAZOLAM HYDROCHLORIDE 5 MG: 1 INJECTION, SOLUTION INTRAMUSCULAR; INTRAVENOUS at 10:02

## 2023-02-07 RX ADMIN — CEFAZOLIN SODIUM 2 G: 2 SOLUTION INTRAVENOUS at 10:02

## 2023-02-07 RX ADMIN — SODIUM CHLORIDE, SODIUM LACTATE, POTASSIUM CHLORIDE, AND CALCIUM CHLORIDE: .6; .31; .03; .02 INJECTION, SOLUTION INTRAVENOUS at 09:02

## 2023-02-07 RX ADMIN — ONDANSETRON 4 MG: 2 INJECTION INTRAMUSCULAR; INTRAVENOUS at 10:02

## 2023-02-07 NOTE — DISCHARGE SUMMARY
[facility], operated by [facility]  Discharge Note  Short Stay    Procedure(s) (LRB):  EXCISION, MASS, BREAST (Right)      OUTCOME: Patient tolerated treatment/procedure well without complication and is now ready for discharge.    DISPOSITION: Home or Self Care    FINAL DIAGNOSIS:  Intraductal papilloma    FOLLOWUP: In clinic    DISCHARGE INSTRUCTIONS:    Discharge Procedure Orders   Diet general     Call MD for:  temperature >100.4     Call MD for:  persistent nausea and vomiting     Call MD for:  severe uncontrolled pain     Call MD for:  difficulty breathing, headache or visual disturbances     Call MD for:  redness, tenderness, or signs of infection (pain, swelling, redness, odor or green/yellow discharge around incision site)     Call MD for:  persistent dizziness or light-headedness     No dressing needed     Wound care routine (specify)   Order Comments: Wound care routine: wear tight, supportive bra         Clinical Reference Documents Added to Patient Instructions         Document    LUMPECTOMY DISCHARGE INSTRUCTIONS (ENGLISH)            TIME SPENT ON DISCHARGE:    minutes

## 2023-02-07 NOTE — OP NOTE
[facility]  General Surgery  Operative Note    SUMMARY     Date of Procedure: 2/7/2023     Procedure: Procedure(s) (LRB):  EXCISION, MASS, BREAST (Right)       Surgeon(s) and Role:     * Paige Gregorio MD - Primary    Assisting Surgeon: None    Pre-Operative Diagnosis: Intraductal papilloma [D36.9]    Post-Operative Diagnosis: Post-Op Diagnosis Codes:     * Intraductal papilloma [D36.9]    Anesthesia: General    Operative Findings (including complications, if any): right breast specimen with  radar reflector and biopsy clip in specimen confirmed by radiology, specimen marked short stitch superior long stitch lateral    Estimated Blood Loss (EBL): 3cc           Implants: * No implants in log *    Specimens:   Specimen (24h ago, onward)       Start     Ordered    02/07/23 1126  Specimen to Pathology, Surgery General Surgery  Once        Comments: Pre-op Diagnosis: Intraductal papilloma [D36.9]Procedure(s):EXCISION, MASS, BREAST Number of specimens: 1Name of specimens: 1) Right breast mass: short suture superior, long suture lateral     References:    Click here for ordering Quick Tip   Question Answer Comment   Procedure Type: General Surgery    Which provider would you like to cc? PAIGE GREGORIO    Release to patient Immediate        02/07/23 1125                            Condition: Good    Disposition: PACU - hemodynamically stable.    Indication: Abnormal mammogram, biopsy proven intraductal papilloma right breast.  Excisional biopsy was recommended.    Procedure in detail:    radar reflector localization of the area of concern in the right breast was completed by radiology prior to the case.  She was taken to the OR and placed supine on the OR table. Anesthesia administered sedation medications and her right breast was prepped and draped in the usual sterile fashion. The mammographic images taken after placement of the radar reflector were reviewed and the radar detector used to locate the  site.  An incision was made over the lateral aspect of the breast. Appropriately thick superior and inferior flaps were raised with Bovie electrocautery. The specimen was dissected to appropriate depth to include the entirety of the area of concern and the radar reflector. The specimen was completely dissected free, removed from the operative field, and marked with 2-0 silk marking stitches in the usual manner (short stitch superior, long stitch lateral).  The specimen was sent to radiology and radiology confirmed the area of interest was in the specimen. Hemostasis was achieved with electrocautery and the area was irrigated copiously and irrigation was evacuated with suction. The subdermal tissues were closed with 3-0 vicryl in interrupted fashion and the skin was closed with 4-0 monocryl subcuticular running suture. Dermabond was placed over the incision.  She was sent to recovery in good condition having tolerated the procedure well.  All counts were correct.

## 2023-02-07 NOTE — INTERVAL H&P NOTE
The patient has been examined and the H&P has been reviewed:    I concur with the findings and changes have been noted since the H&P was written:  radar reflector placed for localization    Surgery risks, benefits and alternative options discussed and understood by patient/family.          There are no hospital problems to display for this patient.

## 2023-02-07 NOTE — ANESTHESIA POSTPROCEDURE EVALUATION
Anesthesia Post Evaluation    Patient: Stephan Abraham    Procedure(s) Performed: Procedure(s) (LRB):  EXCISION, MASS, BREAST (Right)    Final Anesthesia Type: general      Patient location during evaluation: PACU  Patient participation: Yes- Able to Participate  Level of consciousness: awake and alert  Post-procedure vital signs: reviewed and stable  Pain management: adequate  Airway patency: patent    PONV status at discharge: No PONV  Anesthetic complications: no      Cardiovascular status: blood pressure returned to baseline  Respiratory status: unassisted  Hydration status: euvolemic  Follow-up not needed.          Vitals Value Taken Time   /63 02/07/23 1320   Temp 36.2 °C (97.2 °F) 02/07/23 1144   Pulse 78 02/07/23 1320   Resp 13 02/07/23 1320   SpO2 95 % 02/07/23 1320         Event Time   Out of Recovery 13:00:00         Pain/Ruby Score: Pain Rating Prior to Med Admin: 6 (2/7/2023 12:54 PM)  Ruby Score: 10 (2/7/2023 12:45 PM)  Modified Ruby Score: 20 (2/7/2023  1:15 PM)

## 2023-02-07 NOTE — PLAN OF CARE
Discharge and follow up instructions given to patient and patient's sister.  Both verbalized understanding and voiced no questions or concerns at this time.

## 2023-02-14 VITALS
HEIGHT: 68 IN | BODY MASS INDEX: 35.31 KG/M2 | HEART RATE: 78 BPM | TEMPERATURE: 97 F | SYSTOLIC BLOOD PRESSURE: 111 MMHG | WEIGHT: 233 LBS | DIASTOLIC BLOOD PRESSURE: 63 MMHG | RESPIRATION RATE: 13 BRPM | OXYGEN SATURATION: 95 %

## 2023-02-14 LAB
FINAL PATHOLOGIC DIAGNOSIS: NORMAL
GROSS: NORMAL
Lab: NORMAL

## 2023-02-22 ENCOUNTER — OFFICE VISIT (OUTPATIENT)
Dept: SURGERY | Facility: CLINIC | Age: 63
End: 2023-02-22
Payer: MEDICARE

## 2023-02-22 VITALS
OXYGEN SATURATION: 98 % | HEIGHT: 68 IN | WEIGHT: 233 LBS | HEART RATE: 95 BPM | BODY MASS INDEX: 35.31 KG/M2 | SYSTOLIC BLOOD PRESSURE: 175 MMHG | DIASTOLIC BLOOD PRESSURE: 107 MMHG

## 2023-02-22 DIAGNOSIS — D24.1 INTRADUCTAL PAPILLOMA OF RIGHT BREAST: Primary | ICD-10-CM

## 2023-02-22 PROCEDURE — 1159F PR MEDICATION LIST DOCUMENTED IN MEDICAL RECORD: ICD-10-PCS | Mod: CPTII,S$GLB,, | Performed by: STUDENT IN AN ORGANIZED HEALTH CARE EDUCATION/TRAINING PROGRAM

## 2023-02-22 PROCEDURE — 99024 PR POST-OP FOLLOW-UP VISIT: ICD-10-PCS | Mod: S$GLB,,, | Performed by: STUDENT IN AN ORGANIZED HEALTH CARE EDUCATION/TRAINING PROGRAM

## 2023-02-22 PROCEDURE — 4010F ACE/ARB THERAPY RXD/TAKEN: CPT | Mod: CPTII,S$GLB,, | Performed by: STUDENT IN AN ORGANIZED HEALTH CARE EDUCATION/TRAINING PROGRAM

## 2023-02-22 PROCEDURE — 1159F MED LIST DOCD IN RCRD: CPT | Mod: CPTII,S$GLB,, | Performed by: STUDENT IN AN ORGANIZED HEALTH CARE EDUCATION/TRAINING PROGRAM

## 2023-02-22 PROCEDURE — 99999 PR PBB SHADOW E&M-EST. PATIENT-LVL IV: ICD-10-PCS | Mod: PBBFAC,,, | Performed by: STUDENT IN AN ORGANIZED HEALTH CARE EDUCATION/TRAINING PROGRAM

## 2023-02-22 PROCEDURE — 3080F DIAST BP >= 90 MM HG: CPT | Mod: CPTII,S$GLB,, | Performed by: STUDENT IN AN ORGANIZED HEALTH CARE EDUCATION/TRAINING PROGRAM

## 2023-02-22 PROCEDURE — 99024 POSTOP FOLLOW-UP VISIT: CPT | Mod: S$GLB,,, | Performed by: STUDENT IN AN ORGANIZED HEALTH CARE EDUCATION/TRAINING PROGRAM

## 2023-02-22 PROCEDURE — 3077F SYST BP >= 140 MM HG: CPT | Mod: CPTII,S$GLB,, | Performed by: STUDENT IN AN ORGANIZED HEALTH CARE EDUCATION/TRAINING PROGRAM

## 2023-02-22 PROCEDURE — 4010F PR ACE/ARB THEARPY RXD/TAKEN: ICD-10-PCS | Mod: CPTII,S$GLB,, | Performed by: STUDENT IN AN ORGANIZED HEALTH CARE EDUCATION/TRAINING PROGRAM

## 2023-02-22 PROCEDURE — 3008F BODY MASS INDEX DOCD: CPT | Mod: CPTII,S$GLB,, | Performed by: STUDENT IN AN ORGANIZED HEALTH CARE EDUCATION/TRAINING PROGRAM

## 2023-02-22 PROCEDURE — 3008F PR BODY MASS INDEX (BMI) DOCUMENTED: ICD-10-PCS | Mod: CPTII,S$GLB,, | Performed by: STUDENT IN AN ORGANIZED HEALTH CARE EDUCATION/TRAINING PROGRAM

## 2023-02-22 PROCEDURE — 99999 PR PBB SHADOW E&M-EST. PATIENT-LVL IV: CPT | Mod: PBBFAC,,, | Performed by: STUDENT IN AN ORGANIZED HEALTH CARE EDUCATION/TRAINING PROGRAM

## 2023-02-22 PROCEDURE — 3080F PR MOST RECENT DIASTOLIC BLOOD PRESSURE >= 90 MM HG: ICD-10-PCS | Mod: CPTII,S$GLB,, | Performed by: STUDENT IN AN ORGANIZED HEALTH CARE EDUCATION/TRAINING PROGRAM

## 2023-02-22 PROCEDURE — 3077F PR MOST RECENT SYSTOLIC BLOOD PRESSURE >= 140 MM HG: ICD-10-PCS | Mod: CPTII,S$GLB,, | Performed by: STUDENT IN AN ORGANIZED HEALTH CARE EDUCATION/TRAINING PROGRAM

## 2023-02-22 RX ORDER — HYDROCODONE BITARTRATE AND ACETAMINOPHEN 10; 325 MG/1; MG/1
1 TABLET ORAL EVERY 6 HOURS PRN
COMMUNITY
Start: 2023-01-23

## 2023-02-22 NOTE — PROGRESS NOTES
"[facility]  Follow-up    Subjective:     Chief Complaint: post op    HPI:  Stephan Abraham is a 62 y.o. female here today for postop visit.  Two weeks status post right breast lumpectomy for intraductal papilloma.  She is doing well since surgery.  Incision is healing well with no signs or symptoms of infection.  She does have a mild papular rash with pruritus around the incision.  Patient notes that she has frequent skin reactions to the tap water at her house.  She usually showers with bottled water but recently used the tap water inciting the rash.  She has taken Benadryl and most of the rash has resolved.  No pain.  Pathology confirmed no atypia or malignancy.        Objective:      Vitals:    02/22/23 0910   BP: (!) 175/107   Pulse: 95   SpO2: 98%   Weight: 105.7 kg (233 lb)   Height: 5' 8" (1.727 m)     Incision healing well  Papular rash surrounding incision, no signs of infection    Pathology:  Final Pathologic Diagnosis RIGHT BREAST MASS WITHOUT SKIN OR NIPPLE, EXCISIONAL BIOPSY:   -  Fibrocystic changes with usual ductal hyperplasia, columnar cell change,   columnar cell hyperplasia, sclerosing adenosis, marked stromal fibrosis, and   focal microcalcifications.   -  Duct ectasia.   -  Multifocal marked periductal chronic inflammation with reactive lymphoid   follicles.   -  Organizing biopsy site with fat necrosis, hemorrhage, chronic   inflammation, foreign body giant cell reaction and foreign body granulomata,   and marked reactive fibrosis.  A Vision biopsy clip and a SEAMUS    localizer are grossly identified at the biopsy site.   -  No residual intraductal papilloma.   -  No evidence of atypia or malignancy.         Assessment:       1. Intraductal papilloma of right breast          Plan:   Intraductal papilloma of right breast        Medical Decision Making/Counseling:  Consider referral to medical oncology for endocrine therapy    Follow up: PRN    Patient instructed that best way " to communicate with my office staff is for patient to get on the [facility] patient portal to expedite communication and communication issues that may occur.  Patient was given instructions on how to get on the portal.  I encouraged patient to obtain portal access as well.  Ultimately it is up to the patient to obtain access.  Patient voiced understanding.

## 2023-02-23 ENCOUNTER — TELEPHONE (OUTPATIENT)
Dept: SURGERY | Facility: CLINIC | Age: 63
End: 2023-02-23
Payer: MEDICARE

## 2023-02-23 NOTE — TELEPHONE ENCOUNTER
----- Message from Paige Gregorio MD sent at 2/23/2023 10:45 AM CST -----  Please contact the patient to let her know that referral to medical oncology in NOT needed. No further follow up needed other than routine mammograms. Thanks.

## 2023-03-27 ENCOUNTER — OFFICE VISIT (OUTPATIENT)
Dept: FAMILY MEDICINE | Facility: CLINIC | Age: 63
End: 2023-03-27
Payer: MEDICARE

## 2023-03-27 VITALS
HEART RATE: 90 BPM | BODY MASS INDEX: 35.01 KG/M2 | RESPIRATION RATE: 15 BRPM | WEIGHT: 231 LBS | HEIGHT: 68 IN | OXYGEN SATURATION: 97 % | SYSTOLIC BLOOD PRESSURE: 120 MMHG | DIASTOLIC BLOOD PRESSURE: 74 MMHG

## 2023-03-27 DIAGNOSIS — Z00.00 ANNUAL PHYSICAL EXAM: Primary | ICD-10-CM

## 2023-03-27 DIAGNOSIS — E78.49 OTHER HYPERLIPIDEMIA: ICD-10-CM

## 2023-03-27 DIAGNOSIS — I10 ESSENTIAL HYPERTENSION: ICD-10-CM

## 2023-03-27 DIAGNOSIS — R79.9 ABNORMAL FINDING OF BLOOD CHEMISTRY, UNSPECIFIED: ICD-10-CM

## 2023-03-27 PROCEDURE — 99396 PR PREVENTIVE VISIT,EST,40-64: ICD-10-PCS | Mod: S$GLB,,, | Performed by: FAMILY MEDICINE

## 2023-03-27 PROCEDURE — 1159F PR MEDICATION LIST DOCUMENTED IN MEDICAL RECORD: ICD-10-PCS | Mod: CPTII,S$GLB,, | Performed by: FAMILY MEDICINE

## 2023-03-27 PROCEDURE — 99999 PR PBB SHADOW E&M-EST. PATIENT-LVL IV: ICD-10-PCS | Mod: PBBFAC,,, | Performed by: FAMILY MEDICINE

## 2023-03-27 PROCEDURE — 3008F BODY MASS INDEX DOCD: CPT | Mod: CPTII,S$GLB,, | Performed by: FAMILY MEDICINE

## 2023-03-27 PROCEDURE — 99999 PR PBB SHADOW E&M-EST. PATIENT-LVL IV: CPT | Mod: PBBFAC,,, | Performed by: FAMILY MEDICINE

## 2023-03-27 PROCEDURE — 3074F SYST BP LT 130 MM HG: CPT | Mod: CPTII,S$GLB,, | Performed by: FAMILY MEDICINE

## 2023-03-27 PROCEDURE — 1160F PR REVIEW ALL MEDS BY PRESCRIBER/CLIN PHARMACIST DOCUMENTED: ICD-10-PCS | Mod: CPTII,S$GLB,, | Performed by: FAMILY MEDICINE

## 2023-03-27 PROCEDURE — 1160F RVW MEDS BY RX/DR IN RCRD: CPT | Mod: CPTII,S$GLB,, | Performed by: FAMILY MEDICINE

## 2023-03-27 PROCEDURE — 3008F PR BODY MASS INDEX (BMI) DOCUMENTED: ICD-10-PCS | Mod: CPTII,S$GLB,, | Performed by: FAMILY MEDICINE

## 2023-03-27 PROCEDURE — 99396 PREV VISIT EST AGE 40-64: CPT | Mod: S$GLB,,, | Performed by: FAMILY MEDICINE

## 2023-03-27 PROCEDURE — 3074F PR MOST RECENT SYSTOLIC BLOOD PRESSURE < 130 MM HG: ICD-10-PCS | Mod: CPTII,S$GLB,, | Performed by: FAMILY MEDICINE

## 2023-03-27 PROCEDURE — 3078F DIAST BP <80 MM HG: CPT | Mod: CPTII,S$GLB,, | Performed by: FAMILY MEDICINE

## 2023-03-27 PROCEDURE — 1159F MED LIST DOCD IN RCRD: CPT | Mod: CPTII,S$GLB,, | Performed by: FAMILY MEDICINE

## 2023-03-27 PROCEDURE — 4010F ACE/ARB THERAPY RXD/TAKEN: CPT | Mod: CPTII,S$GLB,, | Performed by: FAMILY MEDICINE

## 2023-03-27 PROCEDURE — 3078F PR MOST RECENT DIASTOLIC BLOOD PRESSURE < 80 MM HG: ICD-10-PCS | Mod: CPTII,S$GLB,, | Performed by: FAMILY MEDICINE

## 2023-03-27 PROCEDURE — 4010F PR ACE/ARB THEARPY RXD/TAKEN: ICD-10-PCS | Mod: CPTII,S$GLB,, | Performed by: FAMILY MEDICINE

## 2023-03-27 NOTE — PROGRESS NOTES
" Patient ID: Stephan Abraham is a 62 y.o. female.    Chief Complaint: Follow-up      Reviewed family, medical, surgical, and social history.    No cp/sob  No change in mental status  No fever  No asymmetrical limb swelling    Objective:      /74 (BP Location: Left arm, Patient Position: Sitting, BP Method: Medium (Manual))   Pulse 90   Resp 15   Ht 5' 8" (1.727 m)   Wt 104.8 kg (231 lb)   LMP  (LMP Unknown)   SpO2 97%   BMI 35.12 kg/m²   RRR, CTAB, s/nt/nd, no c/c/e, non-toxic appearing, no focal weakness  Assessment:       1. Annual physical exam    2. Essential hypertension    3. Other hyperlipidemia    4. Abnormal finding of blood chemistry, unspecified        Plan:       Annual physical exam  -     Lipid Panel; Future; Expected date: 03/27/2023  -     Comprehensive Metabolic Panel; Future; Expected date: 03/27/2023  -     CBC Auto Differential; Future; Expected date: 03/27/2023  -     Hemoglobin A1C; Future; Expected date: 03/27/2023  -     TSH; Future; Expected date: 03/27/2023  -     T4, free; Future; Expected date: 03/27/2023    Essential hypertension  -     Lipid Panel; Future; Expected date: 03/27/2023  -     Comprehensive Metabolic Panel; Future; Expected date: 03/27/2023  -     CBC Auto Differential; Future; Expected date: 03/27/2023  -     Hemoglobin A1C; Future; Expected date: 03/27/2023  -     TSH; Future; Expected date: 03/27/2023  -     T4, free; Future; Expected date: 03/27/2023    Other hyperlipidemia  -     Lipid Panel; Future; Expected date: 03/27/2023  -     Comprehensive Metabolic Panel; Future; Expected date: 03/27/2023  -     CBC Auto Differential; Future; Expected date: 03/27/2023  -     Hemoglobin A1C; Future; Expected date: 03/27/2023  -     TSH; Future; Expected date: 03/27/2023  -     T4, free; Future; Expected date: 03/27/2023    Abnormal finding of blood chemistry, unspecified  -     Hemoglobin A1C; Future; Expected date: 03/27/2023              Continue current medicines, " any changes noted above  Labs, radiology studies, and procedures/notes from the last 3 months were reviewed.    Risks, benefits, and side effects were discussed with the patient. All questions were answered to the fullest satisfaction of the patient, and pt verbalized understanding and agreement to treatment plan. Pt was to call with any new or worsening symptoms, or present to the ER.

## 2023-03-30 ENCOUNTER — LAB VISIT (OUTPATIENT)
Dept: LAB | Facility: HOSPITAL | Age: 63
End: 2023-03-30
Attending: FAMILY MEDICINE
Payer: MEDICARE

## 2023-03-30 DIAGNOSIS — I10 ESSENTIAL HYPERTENSION: ICD-10-CM

## 2023-03-30 DIAGNOSIS — R79.9 ABNORMAL FINDING OF BLOOD CHEMISTRY, UNSPECIFIED: ICD-10-CM

## 2023-03-30 DIAGNOSIS — Z00.00 ANNUAL PHYSICAL EXAM: ICD-10-CM

## 2023-03-30 DIAGNOSIS — E78.49 OTHER HYPERLIPIDEMIA: ICD-10-CM

## 2023-03-30 LAB
ALBUMIN SERPL BCP-MCNC: 3.7 G/DL (ref 3.5–5.2)
ALBUMIN SERPL BCP-MCNC: 3.7 G/DL (ref 3.5–5.2)
ALP SERPL-CCNC: 102 U/L (ref 55–135)
ALP SERPL-CCNC: 102 U/L (ref 55–135)
ALT SERPL W/O P-5'-P-CCNC: 29 U/L (ref 10–44)
ALT SERPL W/O P-5'-P-CCNC: 29 U/L (ref 10–44)
ANION GAP SERPL CALC-SCNC: 8 MMOL/L (ref 8–16)
ANION GAP SERPL CALC-SCNC: 8 MMOL/L (ref 8–16)
AST SERPL-CCNC: 22 U/L (ref 10–40)
AST SERPL-CCNC: 22 U/L (ref 10–40)
BASOPHILS # BLD AUTO: 0.04 K/UL (ref 0–0.2)
BASOPHILS NFR BLD: 0.5 % (ref 0–1.9)
BILIRUB SERPL-MCNC: 0.5 MG/DL (ref 0.1–1)
BILIRUB SERPL-MCNC: 0.5 MG/DL (ref 0.1–1)
BUN SERPL-MCNC: 18 MG/DL (ref 8–23)
BUN SERPL-MCNC: 18 MG/DL (ref 8–23)
CALCIUM SERPL-MCNC: 9.4 MG/DL (ref 8.7–10.5)
CALCIUM SERPL-MCNC: 9.4 MG/DL (ref 8.7–10.5)
CHLORIDE SERPL-SCNC: 105 MMOL/L (ref 95–110)
CHLORIDE SERPL-SCNC: 105 MMOL/L (ref 95–110)
CHOLEST SERPL-MCNC: 189 MG/DL (ref 120–199)
CHOLEST/HDLC SERPL: 5.6 {RATIO} (ref 2–5)
CO2 SERPL-SCNC: 29 MMOL/L (ref 23–29)
CO2 SERPL-SCNC: 29 MMOL/L (ref 23–29)
CREAT SERPL-MCNC: 1.1 MG/DL (ref 0.5–1.4)
CREAT SERPL-MCNC: 1.1 MG/DL (ref 0.5–1.4)
DIFFERENTIAL METHOD: ABNORMAL
EOSINOPHIL # BLD AUTO: 0.2 K/UL (ref 0–0.5)
EOSINOPHIL NFR BLD: 2.6 % (ref 0–8)
ERYTHROCYTE [DISTWIDTH] IN BLOOD BY AUTOMATED COUNT: 13.5 % (ref 11.5–14.5)
EST. GFR  (NO RACE VARIABLE): 57.2 ML/MIN/1.73 M^2
EST. GFR  (NO RACE VARIABLE): 57.2 ML/MIN/1.73 M^2
ESTIMATED AVG GLUCOSE: 137 MG/DL (ref 68–131)
GLUCOSE SERPL-MCNC: 114 MG/DL (ref 70–110)
GLUCOSE SERPL-MCNC: 114 MG/DL (ref 70–110)
HBA1C MFR BLD: 6.4 % (ref 4–5.6)
HCT VFR BLD AUTO: 36.5 % (ref 37–48.5)
HDLC SERPL-MCNC: 34 MG/DL (ref 40–75)
HDLC SERPL: 18 % (ref 20–50)
HGB BLD-MCNC: 11.8 G/DL (ref 12–16)
IMM GRANULOCYTES # BLD AUTO: 0.03 K/UL (ref 0–0.04)
IMM GRANULOCYTES NFR BLD AUTO: 0.4 % (ref 0–0.5)
LDLC SERPL CALC-MCNC: 126.4 MG/DL (ref 63–159)
LYMPHOCYTES # BLD AUTO: 3.1 K/UL (ref 1–4.8)
LYMPHOCYTES NFR BLD: 39.6 % (ref 18–48)
MCH RBC QN AUTO: 28.2 PG (ref 27–31)
MCHC RBC AUTO-ENTMCNC: 32.3 G/DL (ref 32–36)
MCV RBC AUTO: 87 FL (ref 82–98)
MONOCYTES # BLD AUTO: 0.5 K/UL (ref 0.3–1)
MONOCYTES NFR BLD: 6.8 % (ref 4–15)
NEUTROPHILS # BLD AUTO: 3.9 K/UL (ref 1.8–7.7)
NEUTROPHILS NFR BLD: 50.1 % (ref 38–73)
NONHDLC SERPL-MCNC: 155 MG/DL
NRBC BLD-RTO: 0 /100 WBC
PLATELET # BLD AUTO: 266 K/UL (ref 150–450)
PMV BLD AUTO: 9.4 FL (ref 9.2–12.9)
POTASSIUM SERPL-SCNC: 4.2 MMOL/L (ref 3.5–5.1)
POTASSIUM SERPL-SCNC: 4.2 MMOL/L (ref 3.5–5.1)
PROT SERPL-MCNC: 7.9 G/DL (ref 6–8.4)
PROT SERPL-MCNC: 7.9 G/DL (ref 6–8.4)
RBC # BLD AUTO: 4.19 M/UL (ref 4–5.4)
SODIUM SERPL-SCNC: 142 MMOL/L (ref 136–145)
SODIUM SERPL-SCNC: 142 MMOL/L (ref 136–145)
T4 FREE SERPL-MCNC: 0.92 NG/DL (ref 0.71–1.51)
TRIGL SERPL-MCNC: 143 MG/DL (ref 30–150)
TSH SERPL DL<=0.005 MIU/L-ACNC: 1.02 UIU/ML (ref 0.4–4)
WBC # BLD AUTO: 7.83 K/UL (ref 3.9–12.7)

## 2023-03-30 PROCEDURE — 84443 ASSAY THYROID STIM HORMONE: CPT | Performed by: FAMILY MEDICINE

## 2023-03-30 PROCEDURE — 80061 LIPID PANEL: CPT | Performed by: FAMILY MEDICINE

## 2023-03-30 PROCEDURE — 83036 HEMOGLOBIN GLYCOSYLATED A1C: CPT | Performed by: FAMILY MEDICINE

## 2023-03-30 PROCEDURE — 84439 ASSAY OF FREE THYROXINE: CPT | Performed by: FAMILY MEDICINE

## 2023-03-30 PROCEDURE — 36415 COLL VENOUS BLD VENIPUNCTURE: CPT | Performed by: FAMILY MEDICINE

## 2023-03-30 PROCEDURE — 80053 COMPREHEN METABOLIC PANEL: CPT | Performed by: FAMILY MEDICINE

## 2023-03-30 PROCEDURE — 85025 COMPLETE CBC W/AUTO DIFF WBC: CPT | Performed by: FAMILY MEDICINE

## 2023-04-03 ENCOUNTER — TELEPHONE (OUTPATIENT)
Dept: FAMILY MEDICINE | Facility: CLINIC | Age: 63
End: 2023-04-03
Payer: MEDICARE

## 2023-04-03 NOTE — TELEPHONE ENCOUNTER
----- Message from Jim Lozano MD sent at 3/30/2023  3:00 PM CDT -----  Please notify this patient that their results were in the acceptable range

## 2023-07-20 NOTE — TELEPHONE ENCOUNTER
No care due was identified.  [facility] Embedded Care Due Messages. Reference number: [id number].   7/20/2023 1:46:08 PM CDT

## 2023-07-21 RX ORDER — GABAPENTIN 400 MG/1
CAPSULE ORAL
Qty: 270 CAPSULE | Refills: 3 | Status: SHIPPED | OUTPATIENT
Start: 2023-07-21

## 2023-07-21 NOTE — TELEPHONE ENCOUNTER
Refill Routing Note   Medication(s) are not appropriate for processing by [facility] for the following reason(s):      Medication outside of protocol    [facility] action(s):  Route Care Due:  None identified            Appointments  past 12m or future 3m with PCP    Date Provider   Last Visit   3/27/2023 Jim Lozano MD   Next Visit   Visit date not found Jim Lozano MD   ED visits in past 90 days: 0        Note composed:7:02 AM 07/21/2023

## 2023-08-04 ENCOUNTER — TELEPHONE (OUTPATIENT)
Dept: FAMILY MEDICINE | Facility: CLINIC | Age: 63
End: 2023-08-04
Payer: MEDICARE

## 2023-08-04 DIAGNOSIS — R92.8 ABNORMAL MAMMOGRAM: Primary | ICD-10-CM

## 2023-08-04 NOTE — TELEPHONE ENCOUNTER
----- Message from Fide Olivares sent at 8/4/2023 10:11 AM CDT -----  Contact: Self  Patient is calling in regards to the letter she received in regards to following up with another mammogram since the biopsy was done in February, stated it needed to be ordered by Dr Lozano to have this completed. Can we please have a new mammogram order placed and call pt back to schedule sometime in the morning at [phone]. Thank You.

## 2023-08-04 NOTE — TELEPHONE ENCOUNTER
Pt received letter  in regards to following up with another mammogram since the biopsy was done in February, stated it needed to be ordered by Dr Lozano to have this complete

## 2023-08-17 ENCOUNTER — HOSPITAL ENCOUNTER (OUTPATIENT)
Dept: RADIOLOGY | Facility: HOSPITAL | Age: 63
Discharge: HOME | End: 2023-08-17
Attending: FAMILY MEDICINE
Payer: MEDICARE

## 2023-08-17 DIAGNOSIS — R92.8 ABNORMAL MAMMOGRAM: ICD-10-CM

## 2023-08-17 PROCEDURE — 77061 BREAST TOMOSYNTHESIS UNI: CPT | Mod: 26,RT,, | Performed by: RADIOLOGY

## 2023-08-17 PROCEDURE — 77061 BREAST TOMOSYNTHESIS UNI: CPT | Mod: TC,RT

## 2023-08-17 PROCEDURE — 77065 DX MAMMO INCL CAD UNI: CPT | Mod: 26,RT,, | Performed by: RADIOLOGY

## 2023-08-17 PROCEDURE — 77061 MAMMO DIGITAL DIAGNOSTIC RIGHT WITH TOMO: ICD-10-PCS | Mod: 26,RT,, | Performed by: RADIOLOGY

## 2023-08-17 PROCEDURE — 77065 MAMMO DIGITAL DIAGNOSTIC RIGHT WITH TOMO: ICD-10-PCS | Mod: 26,RT,, | Performed by: RADIOLOGY

## 2023-10-03 ENCOUNTER — PATIENT MESSAGE (OUTPATIENT)
Dept: ADMINISTRATIVE | Facility: HOSPITAL | Age: 63
End: 2023-10-03
Payer: MEDICARE

## 2023-10-17 ENCOUNTER — PATIENT MESSAGE (OUTPATIENT)
Dept: ADMINISTRATIVE | Facility: HOSPITAL | Age: 63
End: 2023-10-17
Payer: MEDICARE

## 2024-06-09 ENCOUNTER — HOSPITAL ENCOUNTER (EMERGENCY)
Facility: HOSPITAL | Age: 64
Discharge: HOME | End: 2024-06-09
Attending: STUDENT IN AN ORGANIZED HEALTH CARE EDUCATION/TRAINING PROGRAM
Payer: MEDICARE

## 2024-06-09 VITALS
WEIGHT: 242 LBS | SYSTOLIC BLOOD PRESSURE: 133 MMHG | HEIGHT: 68 IN | DIASTOLIC BLOOD PRESSURE: 72 MMHG | TEMPERATURE: 99 F | BODY MASS INDEX: 36.68 KG/M2 | HEART RATE: 81 BPM | OXYGEN SATURATION: 96 % | RESPIRATION RATE: 17 BRPM
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
## 2024-06-09 DIAGNOSIS — T14.8XXA MUSCULOSKELETAL STRAIN: ICD-10-CM

## 2024-06-09 DIAGNOSIS — M25.562 LEFT KNEE PAIN: ICD-10-CM

## 2024-06-09 PROCEDURE — 99284 EMERGENCY DEPT VISIT MOD MDM: CPT | Mod: 25

## 2024-06-09 PROCEDURE — 96372 THER/PROPH/DIAG INJ SC/IM: CPT | Performed by: STUDENT IN AN ORGANIZED HEALTH CARE EDUCATION/TRAINING PROGRAM

## 2024-06-09 PROCEDURE — 63600175 PHARM REV CODE 636 W HCPCS: Performed by: STUDENT IN AN ORGANIZED HEALTH CARE EDUCATION/TRAINING PROGRAM

## 2024-06-09 PROCEDURE — 73562 X-RAY EXAM OF KNEE 3: CPT | Mod: 26,LT,, | Performed by: RADIOLOGY

## 2024-06-09 PROCEDURE — 73562 X-RAY EXAM OF KNEE 3: CPT | Mod: TC,LT

## 2024-06-09 RX ORDER — ORPHENADRINE CITRATE 30 MG/ML
60 INJECTION INTRAMUSCULAR; INTRAVENOUS
Status: COMPLETED | OUTPATIENT
Start: 2024-06-09 | End: 2024-06-09

## 2024-06-09 RX ORDER — HYDROCODONE BITARTRATE AND ACETAMINOPHEN 7.5; 325 MG/1; MG/1
1 TABLET ORAL EVERY 6 HOURS PRN
Qty: 12 TABLET | Refills: 0 | Status: SHIPPED | OUTPATIENT
Start: 2024-06-09

## 2024-06-09 RX ORDER — KETOROLAC TROMETHAMINE 30 MG/ML
30 INJECTION, SOLUTION INTRAMUSCULAR; INTRAVENOUS
Status: COMPLETED | OUTPATIENT
Start: 2024-06-09 | End: 2024-06-09

## 2024-06-09 RX ADMIN — ORPHENADRINE CITRATE 60 MG: 60 INJECTION INTRAMUSCULAR; INTRAVENOUS at 03:06

## 2024-06-09 RX ADMIN — KETOROLAC TROMETHAMINE 30 MG: 30 INJECTION, SOLUTION INTRAMUSCULAR at 03:06

## 2024-06-09 NOTE — ED NOTES
Patient feeling better. Pain decreased with weight bearing after medications. Rx provided with instructions for use and combination with home meds. Verbalized to ice, and rest extremity and to return for new or worsening symptoms. Voices understanding of treatment plan. Escorted to lobby to complete d/c process.
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
## 2024-06-09 NOTE — DISCHARGE INSTRUCTIONS
Take Norco every 6 hours as needed for pain  May take Flexeril every 6 hours for muscle spasms  Increased rest.  Ice, elevate affected extremity when in his seated position.  Follow up with the primary care physician

## 2024-07-09 ENCOUNTER — PATIENT MESSAGE (OUTPATIENT)
Dept: ADMINISTRATIVE | Facility: HOSPITAL | Age: 64
End: 2024-07-09
Payer: MEDICARE

## (undated) DEVICE — SUT SILK 0 SH 30IN BLK BR

## (undated) DEVICE — SUT CTD VICRYL 3-0 CR/SH

## (undated) DEVICE — ADHESIVE DERMABOND ADVANCED

## (undated) DEVICE — CANISTER SUCTION 3000CC

## (undated) DEVICE — SPONGE LAP 18X18 PREWASHED

## (undated) DEVICE — SHEATH GUIDE SCOUT SURG RADAR

## (undated) DEVICE — SUT MONOCRYL 4-0 PS-2

## (undated) DEVICE — GLOVE SURG ULTRA TOUCH 6

## (undated) DEVICE — BLADE EZ CLEAN 2.5IN MODIFIED

## (undated) DEVICE — GLOVE SURGEONS ULTRA TOUCH 6.5

## (undated) DEVICE — GOWN POLY REINF X-LONG XL

## (undated) DEVICE — UNDERGLOVES BIOGEL PI SZ 7 LF

## (undated) DEVICE — LABEL BLANK SURG 10 PER SHEET

## (undated) DEVICE — SOL WATER STRL IRR 1000ML

## (undated) DEVICE — Device

## (undated) DEVICE — PAD SUREFIT GRND ELECTRD 10FT

## (undated) DEVICE — GLOVE SURG ULTRA TOUCH 7